# Patient Record
Sex: FEMALE | Race: WHITE | NOT HISPANIC OR LATINO | Employment: OTHER | URBAN - NONMETROPOLITAN AREA
[De-identification: names, ages, dates, MRNs, and addresses within clinical notes are randomized per-mention and may not be internally consistent; named-entity substitution may affect disease eponyms.]

---

## 2017-01-05 ENCOUNTER — OFFICE VISIT (OUTPATIENT)
Dept: FAMILY MEDICINE CLINIC | Facility: CLINIC | Age: 82
End: 2017-01-05

## 2017-01-05 ENCOUNTER — HOSPITAL ENCOUNTER (OUTPATIENT)
Dept: GENERAL RADIOLOGY | Facility: HOSPITAL | Age: 82
Discharge: HOME | End: 2017-01-05
Admitting: NURSE PRACTITIONER

## 2017-01-05 VITALS
WEIGHT: 165 LBS | SYSTOLIC BLOOD PRESSURE: 120 MMHG | OXYGEN SATURATION: 97 % | HEART RATE: 78 BPM | DIASTOLIC BLOOD PRESSURE: 68 MMHG | BODY MASS INDEX: 28.32 KG/M2

## 2017-01-05 DIAGNOSIS — J18.9 PNEUMONIA DUE TO INFECTIOUS ORGANISM, UNSPECIFIED LATERALITY, UNSPECIFIED PART OF LUNG: ICD-10-CM

## 2017-01-05 PROCEDURE — 99213 OFFICE O/P EST LOW 20 MIN: CPT | Performed by: NURSE PRACTITIONER

## 2017-01-05 PROCEDURE — 71020 HC CHEST PA AND LATERAL: CPT

## 2017-01-05 NOTE — MR AVS SNAPSHOT
Noa Lesley   1/5/2017 2:30 PM   Office Visit    Dept Phone:  [phone]   Encounter #:  [id number]    Provider:  FARTUN Dukes   Department:  [facility] PRIMARY [facility]ARE                Your Full Care Plan              Today's Medication Changes          These changes are accurate as of: 1/5/17  3:11 PM.  If you have any questions, ask your nurse or doctor.               Stop taking medication(s)listed here:     levoFLOXacin 750 MG tablet   Commonly known as:  LEVAQUIN                      Your Updated Medication List          This list is accurate as of: 1/5/17  3:11 PM.  Always use your most recent med list.                albuterol 1.25 MG/3ML nebulizer solution   Commonly known as:  ACCUNEB   Take 3 mL by nebulization Every 4 (Four) Hours As Needed for wheezing or shortness of air for up to 30 days.       amLODIPine-benazepril 10-20 MG per capsule   Commonly known as:  LOTREL   TAKE 1 CAPSULE DAILY       CALTRATE 600+D PLUS MINERALS 600-800 MG-UNIT chewable tablet       levothyroxine 100 MCG tablet   Commonly known as:  SYNTHROID, LEVOTHROID   TAKE 1 TABLET DAILY       metoprolol tartrate 25 MG tablet   Commonly known as:  LOPRESSOR       MUCINEX 600 MG 12 hr tablet   Generic drug:  guaiFENesin       Vitamin D3 1000 UNITS capsule               You Were Diagnosed With        Codes Comments    Pneumonia due to infectious organism, unspecified laterality, unspecified part of lung     ICD-10-CM: J18.9  ICD-9-CM: 136.9, 484.8       Medications to be Given to You by a Medical Professional     Due       Frequency    (none) albuterol (PROVENTIL) nebulizer solution 2.5 mg  Every 6 Hours PRN      Instructions     None    Patient Instructions History      Upcoming Appointments     Visit Type Date Time Department    FOLLOW UP 1/5/2017  2:30 PM [facility]    FOLLOW UP 3/9/2017  9:30 AM JOAN Jackson Signup     Our records indicate that you have  declined [facility] signup. If you would like to sign up for [facility], please email [email or web address] or call [phone] to obtain an activation code.             Other Info from Your Visit           Your Appointments     Mar 09, 2017  9:30 AM EST   Follow Up with Matt Blake MD   [facility] (--)    [address] [place] [id number] [phone]           Arrive 15 minutes prior to appointment.              Allergies     No Known Allergies      Reason for Visit     Follow-up Pt here for f/u on pneumonia      Vital Signs     Blood Pressure Pulse Weight Oxygen Saturation Body Mass Index Smoking Status    120/68 (BP Location: Left arm, Patient Position: Sitting) 78 165 lb (74.8 kg) 97% 28.32 kg/m2 Never Smoker      Problems and Diagnoses Noted     Pneumonia due to infectious organism, unspecified laterality, unspecified part of lung

## 2017-01-05 NOTE — PROGRESS NOTES
Subjective   Noa Sutton is a 89 y.o. female.     HPI Comments: Patient is here today for follow up on her Pneumonia. She states she is doing much better with this now. She states she has had no more fever and she is able to lay down to sleep now. She states the breathing treatments have really helped her a lot. She states she was using the breathing treatments qid, but now only bid. She states she is still taking her Mucinex and her cough medication as well, and finished all of her antibiotics.       The following portions of the patient's history were reviewed and updated as appropriate: allergies, current medications, past family history, past medical history, past social history, past surgical history and problem list.    Review of Systems   Constitutional: Negative.    HENT: Negative.    Respiratory: Positive for cough and shortness of breath. Negative for apnea, choking, chest tightness, wheezing and stridor.         Very little coughing, much improved, and SOA is only with exertion now.   Cardiovascular: Negative.    Gastrointestinal: Negative for constipation, diarrhea, nausea and vomiting.   Genitourinary: Negative.    Musculoskeletal: Negative.    Skin: Negative.    Allergic/Immunologic: Negative.    Neurological: Negative for dizziness and headaches.   Hematological: Negative.    Psychiatric/Behavioral: Negative for confusion.       Objective   Physical Exam   Constitutional: She is oriented to person, place, and time. She appears well-developed and well-nourished.   HENT:   Head: Normocephalic.   Right Ear: External ear normal.   Left Ear: External ear normal.   Nose: Nose normal.   Eyes: Conjunctivae are normal.   Cardiovascular: Normal rate, regular rhythm and normal heart sounds.    Pulmonary/Chest: Effort normal. No respiratory distress. She has wheezes in the right upper field, the right middle field, the left upper field and the left middle field. She has rhonchi in the right upper field, the  right middle field, the left upper field and the left middle field.   Much improved from previous assessment   Abdominal: Soft. Bowel sounds are normal.   Neurological: She is alert and oriented to person, place, and time.   Skin: Skin is warm and dry.   Psychiatric: She has a normal mood and affect. Her behavior is normal. Judgment and thought content normal.   Nursing note and vitals reviewed.      Assessment/Plan   Noa was seen today for follow-up.    Diagnoses and all orders for this visit:    Pneumonia due to infectious organism, unspecified laterality, unspecified part of lung  -     XR Chest PA & Lateral; Future     PA and Lat chest xray ordered, to re-evaluate her pneumonia. Patient was encouraged to keep me informed of any acute changes, lack of improvement, or any new concerning symptoms.   Patient is much improved from last visit. I advised her to continue her Nebulizer treatments every 8 hours for now. Patient voiced understanding of all instructions and denied further questions.    Patient to RTC prn.

## 2017-01-06 ENCOUNTER — TELEPHONE (OUTPATIENT)
Dept: FAMILY MEDICINE CLINIC | Facility: CLINIC | Age: 82
End: 2017-01-06

## 2017-01-06 NOTE — TELEPHONE ENCOUNTER
----- Message from Lucy Álvarez MA sent at 1/6/2017  5:52 PM EST -----  Per josesito pt notified of results    ----- Message -----     From: FARTUN Dukes     Sent: 1/6/2017   1:49 PM       To: Lucy Álvarez MA    Let her know that her Pneumonia has resolved now, so now more antibiotics are needed. Tell her to just make sure she keeps using her breathing treatments every 8 hours, and taking her Mucinex and cough medication, until she is feeling better.

## 2017-03-09 ENCOUNTER — OFFICE VISIT (OUTPATIENT)
Dept: INTERNAL MEDICINE | Facility: CLINIC | Age: 82
End: 2017-03-09

## 2017-03-09 VITALS
BODY MASS INDEX: 28.34 KG/M2 | DIASTOLIC BLOOD PRESSURE: 75 MMHG | HEART RATE: 77 BPM | HEIGHT: 64 IN | SYSTOLIC BLOOD PRESSURE: 120 MMHG | WEIGHT: 166 LBS | TEMPERATURE: 98.4 F | OXYGEN SATURATION: 93 %

## 2017-03-09 DIAGNOSIS — E03.9 ACQUIRED HYPOTHYROIDISM: ICD-10-CM

## 2017-03-09 DIAGNOSIS — R53.83 OTHER FATIGUE: ICD-10-CM

## 2017-03-09 DIAGNOSIS — I10 ESSENTIAL HYPERTENSION: Primary | ICD-10-CM

## 2017-03-09 LAB
ALBUMIN SERPL-MCNC: 4.6 G/DL (ref 3.2–4.8)
ALBUMIN/GLOB SERPL: 1.5 G/DL (ref 1.5–2.5)
ALP SERPL-CCNC: 83 U/L (ref 25–100)
ALT SERPL W P-5'-P-CCNC: 19 U/L (ref 7–40)
ANION GAP SERPL CALCULATED.3IONS-SCNC: 6 MMOL/L (ref 3–11)
AST SERPL-CCNC: 33 U/L (ref 0–33)
BILIRUB SERPL-MCNC: 0.7 MG/DL (ref 0.3–1.2)
BUN BLD-MCNC: 17 MG/DL (ref 9–23)
BUN/CREAT SERPL: 21.3 (ref 7–25)
CALCIUM SPEC-SCNC: 10.4 MG/DL (ref 8.7–10.4)
CHLORIDE SERPL-SCNC: 99 MMOL/L (ref 99–109)
CO2 SERPL-SCNC: 31 MMOL/L (ref 20–31)
CREAT BLD-MCNC: 0.8 MG/DL (ref 0.6–1.3)
DEPRECATED RDW RBC AUTO: 47.9 FL (ref 37–54)
ERYTHROCYTE [DISTWIDTH] IN BLOOD BY AUTOMATED COUNT: 14 % (ref 11.3–14.5)
GFR SERPL CREATININE-BSD FRML MDRD: 67 ML/MIN/1.73
GLOBULIN UR ELPH-MCNC: 3 GM/DL
GLUCOSE BLD-MCNC: 99 MG/DL (ref 70–100)
HCT VFR BLD AUTO: 43.2 % (ref 34.5–44)
HGB BLD-MCNC: 14.1 G/DL (ref 11.5–15.5)
MCH RBC QN AUTO: 30.5 PG (ref 27–31)
MCHC RBC AUTO-ENTMCNC: 32.6 G/DL (ref 32–36)
MCV RBC AUTO: 93.5 FL (ref 80–99)
PLATELET # BLD AUTO: 273 10*3/MM3 (ref 150–450)
PMV BLD AUTO: 11.5 FL (ref 6–12)
POTASSIUM BLD-SCNC: 4.9 MMOL/L (ref 3.5–5.5)
PROT SERPL-MCNC: 7.6 G/DL (ref 5.7–8.2)
RBC # BLD AUTO: 4.62 10*6/MM3 (ref 3.89–5.14)
SODIUM BLD-SCNC: 136 MMOL/L (ref 132–146)
TSH SERPL DL<=0.05 MIU/L-ACNC: 4.56 MIU/ML (ref 0.35–5.35)
WBC NRBC COR # BLD: 7.74 10*3/MM3 (ref 3.5–10.8)

## 2017-03-09 PROCEDURE — 84443 ASSAY THYROID STIM HORMONE: CPT | Performed by: INTERNAL MEDICINE

## 2017-03-09 PROCEDURE — 36415 COLL VENOUS BLD VENIPUNCTURE: CPT | Performed by: INTERNAL MEDICINE

## 2017-03-09 PROCEDURE — 85027 COMPLETE CBC AUTOMATED: CPT | Performed by: INTERNAL MEDICINE

## 2017-03-09 PROCEDURE — 99214 OFFICE O/P EST MOD 30 MIN: CPT | Performed by: INTERNAL MEDICINE

## 2017-03-09 PROCEDURE — 80053 COMPREHEN METABOLIC PANEL: CPT | Performed by: INTERNAL MEDICINE

## 2017-03-09 NOTE — PROGRESS NOTES
Subjective  Noa Sutton is a 89 y.o. female    HPI coming in for follow-up patient with a history of hypertension and hypothyroidism has been complaining of increasing fatigue over the last 2 weeks no night sweats fever or chills. Nonsmoker    The following portions of the patient's history were reviewed and updated as appropriate: allergies, current medications, past family history, past medical history, past social history, past surgical history, and problem list.     Review of Systems   Constitutional: Positive for fatigue. Negative for activity change, appetite change and fever.   HENT: Negative for congestion, ear discharge, ear pain and trouble swallowing.    Eyes: Positive for visual disturbance. Negative for photophobia.   Respiratory: Positive for shortness of breath. Negative for cough.    Cardiovascular: Negative for chest pain and palpitations.   Gastrointestinal: Negative for abdominal distention, abdominal pain, constipation, diarrhea, nausea and vomiting.   Endocrine: Negative.    Genitourinary: Negative for dysuria, hematuria and urgency.   Musculoskeletal: Positive for arthralgias. Negative for back pain, joint swelling and myalgias.   Skin: Negative for color change and rash.   Allergic/Immunologic: Negative.    Neurological: Negative for dizziness, weakness, light-headedness and headaches.   Hematological: Negative for adenopathy. Does not bruise/bleed easily.   Psychiatric/Behavioral: Negative for agitation, confusion, dysphoric mood and sleep disturbance. The patient is not nervous/anxious.        Vitals:    03/09/17 0949   BP: 120/75   Pulse: 77   Temp: 98.4 °F (36.9 °C)   SpO2: 93%       Objective  Physical Exam   Constitutional: She is oriented to person, place, and time. She appears well-developed and well-nourished. No distress.   HENT:   Nose: Nose normal.   Mouth/Throat: Oropharynx is clear and moist.   Eyes: Conjunctivae and EOM are normal. No scleral icterus.   Neck: No tracheal  deviation present. No thyromegaly present.   Cardiovascular: Normal rate and regular rhythm.  Exam reveals no friction rub.    No murmur heard.  Pulmonary/Chest: No respiratory distress. She has no wheezes. She has no rales.   Abdominal: Soft. She exhibits no distension and no mass. There is no tenderness. There is no guarding.   Musculoskeletal: Normal range of motion. She exhibits deformity.   Lymphadenopathy:     She has no cervical adenopathy.   Neurological: She is alert and oriented to person, place, and time. She has normal reflexes. No cranial nerve deficit. Coordination normal.   Skin: Skin is warm and dry. No rash noted. No erythema.   Psychiatric: She has a normal mood and affect. Her behavior is normal. Judgment and thought content normal.       Diagnoses and all orders for this visit:    Essential hypertension stable with current meds and low-salt diet    Acquired hypothyroidism clinically euthyroid follow TSH    Other fatigue exam unremarkable check routine lab work. No recent weight loss no change in bowel habits

## 2017-06-08 ENCOUNTER — OFFICE VISIT (OUTPATIENT)
Dept: INTERNAL MEDICINE | Facility: CLINIC | Age: 82
End: 2017-06-08

## 2017-06-08 VITALS
TEMPERATURE: 98.6 F | SYSTOLIC BLOOD PRESSURE: 115 MMHG | HEART RATE: 65 BPM | OXYGEN SATURATION: 95 % | HEIGHT: 64 IN | WEIGHT: 167 LBS | DIASTOLIC BLOOD PRESSURE: 80 MMHG | BODY MASS INDEX: 28.51 KG/M2

## 2017-06-08 DIAGNOSIS — I10 ESSENTIAL HYPERTENSION: Primary | ICD-10-CM

## 2017-06-08 DIAGNOSIS — E03.9 ACQUIRED HYPOTHYROIDISM: ICD-10-CM

## 2017-06-08 DIAGNOSIS — E55.9 VITAMIN D DEFICIENCY: ICD-10-CM

## 2017-06-08 PROCEDURE — 99397 PER PM REEVAL EST PAT 65+ YR: CPT | Performed by: INTERNAL MEDICINE

## 2017-06-08 PROCEDURE — G0439 PPPS, SUBSEQ VISIT: HCPCS | Performed by: INTERNAL MEDICINE

## 2017-06-08 RX ORDER — OMEPRAZOLE 20 MG/1
20 CAPSULE, DELAYED RELEASE ORAL DAILY
Qty: 90 CAPSULE | Refills: 3 | Status: SHIPPED | OUTPATIENT
Start: 2017-06-08 | End: 2018-04-29 | Stop reason: SDUPTHER

## 2017-06-08 NOTE — PATIENT INSTRUCTIONS
Medicare Wellness  Personal Prevention Plan of Service     Date of Office Visit:  2017  Encounter Provider:  Matt Blake MD  Place of Service:  [facility]  Patient Name: Noa Sutton  :  10/7/1926    As part of the Medicare Wellness portion of your visit today, we are providing you with this personalized preventive plan of services (PPPS). This plan is based upon recommendations of the [place] Preventive Services Task Force (USPSTF) and the Advisory Committee on Immunization Practices (ACIP).    This lists the preventive care services that should be considered, and provides dates of when you are due. Items listed as completed are up-to-date and do not require any further intervention.    Health Maintenance   Topic Date Due   • TDAP/TD VACCINES (1 - Tdap) 10/07/1945   • PNEUMOCOCCAL VACCINES (65+ LOW/MEDIUM RISK) (2 of 2 - PPSV23) 2012   • INFLUENZA VACCINE  2017   • MEDICARE ANNUAL WELLNESS  2018   • ZOSTER VACCINE  Completed       No orders of the defined types were placed in this encounter.      No Follow-up on file.

## 2017-06-08 NOTE — PROGRESS NOTES
QUICK REFERENCE INFORMATION:  The ABCs of the Annual Wellness Visit    Subsequent Medicare Wellness Visit    HEALTH RISK ASSESSMENT    10/7/1926    Recent Hospitalizations:  No hospitalization(s) within the last year..        Current Medical Providers:  Patient Care Team:  Matt Blake MD as PCP - General        Smoking Status:  History   Smoking Status   • Never Smoker   Smokeless Tobacco   • Never Used       Alcohol Consumption:  History   Alcohol use Not on file       Depression Screen:   PHQ-9 Depression Screening 6/8/2017   Little interest or pleasure in doing things 0   Feeling down, depressed, or hopeless 0   Trouble falling or staying asleep, or sleeping too much -   Feeling tired or having little energy -   Poor appetite or overeating -   Feeling bad about yourself - or that you are a failure or have let yourself or your family down -   Trouble concentrating on things, such as reading the newspaper or watching television -   Moving or speaking so slowly that other people could have noticed. Or the opposite - being so fidgety or restless that you have been moving around a lot more than usual -   Thoughts that you would be better off dead, or of hurting yourself in some way -   PHQ-9 Total Score 0   If you checked off any problems, how difficult have these problems made it for you to do your work, take care of things at home, or get along with other people? -       Health Habits and Functional and Cognitive Screening:  Functional & Cognitive Status 6/8/2017   Do you have difficulty preparing food and eating? No   Do you have difficulty bathing yourself? No   Do you have difficulty getting dressed? No   Do you have difficulty using the toilet? No   Do you have difficulty moving around from place to place? No   In the past year have you fallen or experienced a near fall? No   Do you need help using the phone?  No   Are you deaf or do you have serious difficulty hearing?  No   Do you need help with  transportation? No   Do you need help shopping? No   Do you need help preparing meals?  No   Do you need help with housework?  No   Do you need help with laundry? No   Do you need help taking your medications? No   Do you need help managing money? No   Do you have difficulty concentrating, remembering or making decisions? No       Health Habits  Current Diet: Well Balanced Diet  Dental Exam: Up to date  Eye Exam: Up to date  Exercise (times per week): 7 times per week  Current Exercise Activities Include: (S) Walking (body recall X3 days a week)      Does the patient have evidence of cognitive impairment? No    Aspirin use counseling: Does not need ASA (and currently is not on it)      Recent Lab Results:  CMP:  Lab Results   Component Value Date    BUN 17 03/09/2017    CREATININE 0.80 03/09/2017    EGFRIFNONA 67 03/09/2017    BCR 21.3 03/09/2017     03/09/2017    K 4.9 03/09/2017    CO2 31.0 03/09/2017    CALCIUM 10.4 03/09/2017    ALBUMIN 4.60 03/09/2017    LABIL2 1.5 03/09/2017    BILITOT 0.7 03/09/2017    ALKPHOS 83 03/09/2017    AST 33 03/09/2017    ALT 19 03/09/2017     Lipid Panel:     HbA1c:       Visual Acuity:  No exam data present    Age-appropriate Screening Schedule:  Refer to the list below for future screening recommendations based on patient's age, sex and/or medical conditions. Orders for these recommended tests are listed in the plan section. The patient has been provided with a written plan.    Health Maintenance   Topic Date Due   • TDAP/TD VACCINES (1 - Tdap) 10/07/1945   • PNEUMOCOCCAL VACCINES (65+ LOW/MEDIUM RISK) (2 of 2 - PPSV23) 08/17/2012   • INFLUENZA VACCINE  08/01/2017   • ZOSTER VACCINE  Completed        Subjective   History of Present Illness    Noa Sutton is a 89 y.o. female who presents for an Subsequent Wellness Visit.    The following portions of the patient's history were reviewed and updated as appropriate: allergies, current medications, past family history, past  medical history, past social history, past surgical history and problem list.    Outpatient Medications Prior to Visit   Medication Sig Dispense Refill   • amLODIPine-benazepril (LOTREL) 10-20 MG per capsule TAKE 1 CAPSULE DAILY 90 capsule 3   • Calcium Carbonate-Vit D-Min (CALTRATE 600+D PLUS MINERALS) 600-800 MG-UNIT chewable tablet Chew 1 tablet daily.     • Cholecalciferol (VITAMIN D3) 1000 UNITS capsule Take 1 capsule by mouth daily.     • metoprolol tartrate (LOPRESSOR) 25 MG tablet TAKE 1 TABLET TWICE A  tablet 2   • MUCINEX 600 MG 12 hr tablet take 1 tablet by mouth every 12 hours if needed  0   • levothyroxine (SYNTHROID, LEVOTHROID) 100 MCG tablet TAKE 1 TABLET DAILY 90 tablet 2     Facility-Administered Medications Prior to Visit   Medication Dose Route Frequency Provider Last Rate Last Dose   • albuterol (PROVENTIL) nebulizer solution 2.5 mg  2.5 mg Nebulization Q6H PRN Soraya Bhatti, APRN   2.5 mg at 12/30/16 1900       Patient Active Problem List   Diagnosis   • Essential hypertension   • Acquired hypothyroidism   • Vitamin D deficiency       Advance Care Planning:  has an advance directive - a copy HAS NOT been provided    Identification of Risk Factors:  Risk factors include: increased fall risk, hearing limitations and incontinence.    Review of Systems   Constitutional: Negative.  Negative for activity change, appetite change, fatigue and fever.   HENT: Negative for congestion, ear discharge, ear pain and trouble swallowing.    Eyes: Negative for photophobia and visual disturbance.   Respiratory: Negative for cough and shortness of breath.    Cardiovascular: Negative for chest pain and palpitations.   Gastrointestinal: Negative for abdominal distention, abdominal pain, constipation, diarrhea, nausea and vomiting.   Endocrine: Negative.    Genitourinary: Positive for urgency. Negative for dysuria and hematuria.        Stress incontinence   Musculoskeletal: Positive for arthralgias.  "Negative for back pain, joint swelling and myalgias.   Skin: Negative for color change and rash.   Allergic/Immunologic: Negative.    Neurological: Negative for dizziness, weakness, light-headedness and headaches.   Hematological: Negative for adenopathy. Does not bruise/bleed easily.   Psychiatric/Behavioral: Positive for sleep disturbance. Negative for agitation, confusion and dysphoric mood. The patient is not nervous/anxious.        Compared to one year ago, the patient feels her physical health is the same.  Compared to one year ago, the patient feels her mental health is the same.    Objective     Physical Exam   Constitutional: She is oriented to person, place, and time. She appears well-developed and well-nourished. No distress.   HENT:   Nose: Nose normal.   Mouth/Throat: Oropharynx is clear and moist.   Eyes: Conjunctivae and EOM are normal. No scleral icterus.   Neck: No tracheal deviation present. No thyromegaly present.   Cardiovascular: Normal rate and regular rhythm.  Exam reveals no friction rub.    No murmur heard.  Pulmonary/Chest: No respiratory distress. She has no wheezes. She has no rales.   Abdominal: Soft. She exhibits no distension and no mass. There is no tenderness. There is no guarding.   Musculoskeletal: Normal range of motion. She exhibits deformity.   Lymphadenopathy:     She has no cervical adenopathy.   Neurological: She is alert and oriented to person, place, and time. She has normal reflexes. No cranial nerve deficit. Coordination normal.   Skin: Skin is warm and dry. No rash noted. No erythema.   Psychiatric: She has a normal mood and affect. Her behavior is normal. Judgment and thought content normal.       Vitals:    06/08/17 1029   BP: 115/80   Pulse: 65   Temp: 98.6 °F (37 °C)   SpO2: 95%   Weight: 167 lb (75.8 kg)   Height: 64\" (162.6 cm)       Body mass index is 28.67 kg/(m^2).  Discussed the patient's BMI with her. The BMI is in the acceptable range.    Assessment/Plan "   Patient Self-Management and Personalized Health Advice  The patient has been provided with information about: diet and fall prevention and preventive services including:   · Fall Risk assessment done, Fall Risk plan of care done, Urinary Incontinence assessment done.    Visit Diagnoses:    ICD-10-CM ICD-9-CM   1. Essential hypertension. Stable with current meds I10 401.9   2. Acquired hypothyroidism. Clinically euthyroid E03.9 244.9   3. Vitamin D deficiency. Stable on supplement E55.9 268.9       No orders of the defined types were placed in this encounter.      Outpatient Encounter Prescriptions as of 6/8/2017   Medication Sig Dispense Refill   • amLODIPine-benazepril (LOTREL) 10-20 MG per capsule TAKE 1 CAPSULE DAILY 90 capsule 3   • Calcium Carbonate-Vit D-Min (CALTRATE 600+D PLUS MINERALS) 600-800 MG-UNIT chewable tablet Chew 1 tablet daily.     • Cholecalciferol (VITAMIN D3) 1000 UNITS capsule Take 1 capsule by mouth daily.     • metoprolol tartrate (LOPRESSOR) 25 MG tablet TAKE 1 TABLET TWICE A  tablet 2   • MUCINEX 600 MG 12 hr tablet take 1 tablet by mouth every 12 hours if needed  0   • levothyroxine (SYNTHROID, LEVOTHROID) 100 MCG tablet TAKE 1 TABLET DAILY 90 tablet 2     Facility-Administered Encounter Medications as of 6/8/2017   Medication Dose Route Frequency Provider Last Rate Last Dose   • albuterol (PROVENTIL) nebulizer solution 2.5 mg  2.5 mg Nebulization Q6H PRN Soraya Bhatti, APRN   2.5 mg at 12/30/16 1900       Reviewed use of high risk medication in the elderly: yes  Reviewed for potential of harmful drug interactions in the elderly: yes    Follow Up:  No Follow-up on file.     An After Visit Summary and PPPS with all of these plans were given to the patient.

## 2017-06-12 RX ORDER — LEVOTHYROXINE SODIUM 0.1 MG/1
TABLET ORAL
Qty: 90 TABLET | Refills: 3 | Status: SHIPPED | OUTPATIENT
Start: 2017-06-12 | End: 2018-06-07 | Stop reason: SDUPTHER

## 2017-08-10 ENCOUNTER — OFFICE VISIT (OUTPATIENT)
Dept: INTERNAL MEDICINE | Facility: CLINIC | Age: 82
End: 2017-08-10

## 2017-08-10 VITALS
TEMPERATURE: 98.6 F | WEIGHT: 164.38 LBS | OXYGEN SATURATION: 97 % | HEIGHT: 64 IN | HEART RATE: 75 BPM | SYSTOLIC BLOOD PRESSURE: 121 MMHG | BODY MASS INDEX: 28.06 KG/M2 | DIASTOLIC BLOOD PRESSURE: 80 MMHG

## 2017-08-10 DIAGNOSIS — I10 ESSENTIAL HYPERTENSION: ICD-10-CM

## 2017-08-10 DIAGNOSIS — R39.9 URINARY SYMPTOM OR SIGN: Primary | ICD-10-CM

## 2017-08-10 LAB
ALBUMIN SERPL-MCNC: 4.5 G/DL (ref 3.5–5)
ALBUMIN/GLOB SERPL: 1.6 G/DL (ref 1–2)
ALP SERPL-CCNC: 86 U/L (ref 38–126)
ALT SERPL-CCNC: 36 U/L (ref 13–69)
AST SERPL-CCNC: 40 U/L (ref 15–46)
BILIRUB BLD-MCNC: NEGATIVE MG/DL
BILIRUB SERPL-MCNC: 0.7 MG/DL (ref 0.2–1.3)
BUN SERPL-MCNC: 17 MG/DL (ref 7–20)
BUN/CREAT SERPL: 17 (ref 7.1–23.5)
CALCIUM SERPL-MCNC: 9.8 MG/DL (ref 8.4–10.2)
CHLORIDE SERPL-SCNC: 98 MMOL/L (ref 98–107)
CLARITY, POC: CLEAR
CO2 SERPL-SCNC: 26 MMOL/L (ref 26–30)
COLOR UR: YELLOW
CREAT SERPL-MCNC: 1 MG/DL (ref 0.6–1.3)
ERYTHROCYTE [DISTWIDTH] IN BLOOD BY AUTOMATED COUNT: 13.3 % (ref 11.5–14.5)
GLOBULIN SER CALC-MCNC: 2.8 GM/DL
GLUCOSE SERPL-MCNC: 108 MG/DL (ref 74–98)
GLUCOSE UR STRIP-MCNC: NEGATIVE MG/DL
HCT VFR BLD AUTO: 41.4 % (ref 37–47)
HGB BLD-MCNC: 13.7 G/DL (ref 12–16)
KETONES UR QL: NEGATIVE
LEUKOCYTE EST, POC: ABNORMAL
MCH RBC QN AUTO: 30.3 PG (ref 27–31)
MCHC RBC AUTO-ENTMCNC: 33.1 G/DL (ref 30–37)
MCV RBC AUTO: 91.6 FL (ref 81–99)
NITRITE UR-MCNC: NEGATIVE MG/ML
PH UR: 7 [PH] (ref 5–8)
PLATELET # BLD AUTO: 286 10*3/MM3 (ref 130–400)
POTASSIUM SERPL-SCNC: 5.2 MMOL/L (ref 3.5–5.1)
PROT SERPL-MCNC: 7.3 G/DL (ref 6.3–8.2)
PROT UR STRIP-MCNC: NEGATIVE MG/DL
RBC # BLD AUTO: 4.52 10*6/MM3 (ref 4.2–5.4)
RBC # UR STRIP: NEGATIVE /UL
SODIUM SERPL-SCNC: 136 MMOL/L (ref 137–145)
SP GR UR: 1.01 (ref 1–1.03)
UROBILINOGEN UR QL: NORMAL
WBC # BLD AUTO: 8.4 10*3/MM3 (ref 4.8–10.8)

## 2017-08-10 PROCEDURE — 99213 OFFICE O/P EST LOW 20 MIN: CPT | Performed by: INTERNAL MEDICINE

## 2017-08-10 PROCEDURE — 81003 URINALYSIS AUTO W/O SCOPE: CPT | Performed by: INTERNAL MEDICINE

## 2017-08-10 NOTE — PROGRESS NOTES
Subjective  Noa Sutton is a 89 y.o. female    HPI coming in accompanied by her son was giving us some of the history. Recent history of urinary tract infection for which she was placed on Macrobid for 7 days with some improvement in her urinary symptoms. Currently denies dysuria or hematuria. Denies fever or chills. However she complains of fatigue and some numbness in her feet. Denies melanotic hematochezia. No nausea vomiting or diarrhea    The following portions of the patient's history were reviewed and updated as appropriate: allergies, current medications, past family history, past medical history, past social history, past surgical history, and problem list.     Review of Systems   Constitutional: Positive for fatigue. Negative for activity change, appetite change and fever.   HENT: Negative for congestion, ear discharge, ear pain and trouble swallowing.    Eyes: Negative for photophobia and visual disturbance.   Respiratory: Negative for cough and shortness of breath.    Cardiovascular: Negative for chest pain and palpitations.   Gastrointestinal: Negative for abdominal distention, abdominal pain, constipation, diarrhea, nausea and vomiting.   Endocrine: Negative.    Genitourinary: Negative for dysuria, hematuria and urgency.   Musculoskeletal: Positive for arthralgias. Negative for back pain, joint swelling and myalgias.   Skin: Negative for color change and rash.   Allergic/Immunologic: Negative.    Neurological: Positive for numbness. Negative for dizziness, weakness, light-headedness and headaches.   Hematological: Negative for adenopathy. Does not bruise/bleed easily.   Psychiatric/Behavioral: Negative for agitation, confusion and dysphoric mood. The patient is not nervous/anxious.        Vitals:    08/10/17 1342   BP: 121/80   Pulse: 75   Temp: 98.6 °F (37 °C)   SpO2: 97%       Objective  Physical Exam   Constitutional: She is oriented to person, place, and time. She appears well-developed and  well-nourished. No distress.   HENT:   Nose: Nose normal.   Mouth/Throat: Oropharynx is clear and moist.   Eyes: Conjunctivae and EOM are normal. No scleral icterus.   Neck: No tracheal deviation present. No thyromegaly present.   Cardiovascular: Normal rate and regular rhythm.  Exam reveals no friction rub.    No murmur heard.  Pulmonary/Chest: No respiratory distress. She has no wheezes. She has no rales.   Abdominal: Soft. She exhibits no distension and no mass. There is no tenderness. There is no guarding.   Musculoskeletal: Normal range of motion. She exhibits deformity.   Lymphadenopathy:     She has no cervical adenopathy.   Neurological: She is alert and oriented to person, place, and time. She has normal reflexes. No cranial nerve deficit. Coordination normal.   Skin: Skin is warm and dry. No rash noted. No erythema.   Psychiatric: She has a normal mood and affect. Her behavior is normal. Judgment and thought content normal.       Diagnoses and all orders for this visit:    Urinary symptom or sign. Urine analysis without evidence of significant infection. In view of her symptoms of fatigue and weakness will check routine lab work encouraged to push fluids will observe for now  -     POCT urinalysis dipstick, automated

## 2017-11-10 ENCOUNTER — OFFICE VISIT (OUTPATIENT)
Dept: INTERNAL MEDICINE | Facility: CLINIC | Age: 82
End: 2017-11-10

## 2017-11-10 VITALS
TEMPERATURE: 98.6 F | BODY MASS INDEX: 28.36 KG/M2 | WEIGHT: 166.13 LBS | HEART RATE: 73 BPM | HEIGHT: 64 IN | SYSTOLIC BLOOD PRESSURE: 120 MMHG | OXYGEN SATURATION: 96 % | DIASTOLIC BLOOD PRESSURE: 73 MMHG

## 2017-11-10 DIAGNOSIS — I10 ESSENTIAL HYPERTENSION: ICD-10-CM

## 2017-11-10 DIAGNOSIS — E03.9 ACQUIRED HYPOTHYROIDISM: Primary | ICD-10-CM

## 2017-11-10 DIAGNOSIS — F51.01 PRIMARY INSOMNIA: ICD-10-CM

## 2017-11-10 PROCEDURE — 90662 IIV NO PRSV INCREASED AG IM: CPT | Performed by: INTERNAL MEDICINE

## 2017-11-10 PROCEDURE — G0008 ADMIN INFLUENZA VIRUS VAC: HCPCS | Performed by: INTERNAL MEDICINE

## 2017-11-10 PROCEDURE — 99214 OFFICE O/P EST MOD 30 MIN: CPT | Performed by: INTERNAL MEDICINE

## 2017-11-10 NOTE — PROGRESS NOTES
"Subjective  Noa Sutton is a 89 y.o. female    HPI coming in for follow-up patient with hypothyroidism and hypertension has reflux esophagitis complains of sleep disturbances with poor sleep hygiene. Fairly active. No history of memory deficits    The following portions of the patient's history were reviewed and updated as appropriate: allergies, current medications, past family history, past medical history, past social history, past surgical history, and problem list.     Review of Systems   Constitutional: Positive for fatigue. Negative for activity change, appetite change and fever.   HENT: Negative for congestion, ear discharge, ear pain and trouble swallowing.    Eyes: Negative for photophobia and visual disturbance.   Respiratory: Negative for cough and shortness of breath.    Cardiovascular: Negative for chest pain and palpitations.   Gastrointestinal: Negative for abdominal distention, abdominal pain, constipation, diarrhea, nausea and vomiting.   Endocrine: Negative.    Genitourinary: Negative for dysuria, hematuria and urgency.   Musculoskeletal: Positive for arthralgias. Negative for back pain, joint swelling and myalgias.   Skin: Negative for color change and rash.   Allergic/Immunologic: Negative.    Neurological: Negative for dizziness, weakness, light-headedness and headaches.   Hematological: Negative for adenopathy. Does not bruise/bleed easily.   Psychiatric/Behavioral: Positive for sleep disturbance. Negative for agitation, confusion and dysphoric mood. The patient is not nervous/anxious.        Visit Vitals   • /73   • Pulse 73   • Temp 98.6 °F (37 °C)   • Ht 64\" (162.6 cm)   • Wt 166 lb 2 oz (75.4 kg)   • SpO2 96%   • BMI 28.52 kg/m2       Objective  Physical Exam   Constitutional: She is oriented to person, place, and time. She appears well-developed and well-nourished. No distress.   HENT:   Nose: Nose normal.   Mouth/Throat: Oropharynx is clear and moist.   Eyes: Conjunctivae and EOM " are normal. No scleral icterus.   Neck: No tracheal deviation present. No thyromegaly present.   Cardiovascular: Normal rate and regular rhythm.  Exam reveals no friction rub.    No murmur heard.  Pulmonary/Chest: No respiratory distress. She has no wheezes. She has no rales.   Abdominal: Soft. She exhibits no distension and no mass. There is no tenderness. There is no guarding.   Musculoskeletal: Normal range of motion. She exhibits deformity.   Lymphadenopathy:     She has no cervical adenopathy.   Neurological: She is alert and oriented to person, place, and time. She has normal reflexes. No cranial nerve deficit. Coordination normal.   Skin: Skin is warm and dry. No rash noted. No erythema.   Psychiatric: She has a normal mood and affect. Her behavior is normal. Thought content normal.       Diagnoses and all orders for this visit:    Acquired hypothyroidism clinically euthyroid follow TSH    Essential hypertension stable with current meds and low-salt diet    Primary insomnia counseled about sleep hygiene Will hold off on medications for this    Other orders  -     Flu Vaccine High Dose PF 65YR+ ([id number])

## 2017-12-11 RX ORDER — AMLODIPINE BESYLATE AND BENAZEPRIL HYDROCHLORIDE 10; 20 MG/1; MG/1
CAPSULE ORAL
Qty: 90 CAPSULE | Refills: 3 | Status: SHIPPED | OUTPATIENT
Start: 2017-12-11 | End: 2018-12-04 | Stop reason: SDUPTHER

## 2018-04-30 RX ORDER — OMEPRAZOLE 20 MG/1
CAPSULE, DELAYED RELEASE ORAL
Qty: 90 CAPSULE | Refills: 3 | Status: SHIPPED | OUTPATIENT
Start: 2018-04-30 | End: 2019-04-28 | Stop reason: SDUPTHER

## 2018-05-10 ENCOUNTER — OFFICE VISIT (OUTPATIENT)
Dept: INTERNAL MEDICINE | Facility: CLINIC | Age: 83
End: 2018-05-10

## 2018-05-10 VITALS
HEART RATE: 74 BPM | TEMPERATURE: 99.1 F | OXYGEN SATURATION: 96 % | HEIGHT: 64 IN | WEIGHT: 161 LBS | DIASTOLIC BLOOD PRESSURE: 48 MMHG | BODY MASS INDEX: 27.49 KG/M2 | SYSTOLIC BLOOD PRESSURE: 118 MMHG

## 2018-05-10 DIAGNOSIS — D50.8 IRON DEFICIENCY ANEMIA SECONDARY TO INADEQUATE DIETARY IRON INTAKE: ICD-10-CM

## 2018-05-10 DIAGNOSIS — G25.81 RLS (RESTLESS LEGS SYNDROME): ICD-10-CM

## 2018-05-10 DIAGNOSIS — E03.9 ACQUIRED HYPOTHYROIDISM: Primary | ICD-10-CM

## 2018-05-10 DIAGNOSIS — G62.9 NEUROPATHY: ICD-10-CM

## 2018-05-10 DIAGNOSIS — I10 ESSENTIAL HYPERTENSION: ICD-10-CM

## 2018-05-10 LAB
FERRITIN SERPL-MCNC: 125 NG/ML (ref 11.1–264)
FOLATE SERPL-MCNC: 19.6 NG/ML
VIT B12 SERPL-MCNC: 872 PG/ML (ref 239–931)

## 2018-05-10 PROCEDURE — 99214 OFFICE O/P EST MOD 30 MIN: CPT | Performed by: INTERNAL MEDICINE

## 2018-05-10 NOTE — PROGRESS NOTES
"Subjective  Noa Sutton is a 89 y.o. female    HPI coming in for a follow-up she is accompanied by her son was giving us some of the history has hypertension and hypothyroidism with reflux esophagitis lately has been complaining of a burning sensation intermittently with tingling on the dorsum of her feet bilaterally right more than the left side. Denies alcohol use.    The following portions of the patient's history were reviewed and updated as appropriate: allergies, current medications, past family history, past medical history, past social history, past surgical history, and problem list.     Review of Systems   Constitutional: Negative.  Negative for activity change, appetite change, fatigue and fever.   HENT: Negative for congestion, ear discharge, ear pain and trouble swallowing.    Eyes: Negative for photophobia and visual disturbance.   Respiratory: Negative for cough and shortness of breath.    Cardiovascular: Negative for chest pain and palpitations.   Gastrointestinal: Negative for abdominal distention, abdominal pain, constipation, diarrhea, nausea and vomiting.   Endocrine: Negative.    Genitourinary: Negative for dysuria, hematuria and urgency.   Musculoskeletal: Positive for arthralgias. Negative for back pain, joint swelling and myalgias.   Skin: Negative for color change and rash.   Allergic/Immunologic: Negative.    Neurological: Positive for numbness. Negative for dizziness, weakness, light-headedness and headaches.   Hematological: Negative for adenopathy. Does not bruise/bleed easily.   Psychiatric/Behavioral: Positive for sleep disturbance. Negative for agitation, confusion and dysphoric mood. The patient is not nervous/anxious.        Visit Vitals  /48 (BP Location: Right arm, Patient Position: Sitting)   Pulse 74   Temp 99.1 °F (37.3 °C)   Ht 162.6 cm (64\")   Wt 73 kg (161 lb)   SpO2 96%   BMI 27.64 kg/m²       Objective  Physical Exam   Constitutional: She is oriented to person, " place, and time. She appears well-developed and well-nourished. No distress.   HENT:   Nose: Nose normal.   Mouth/Throat: Oropharynx is clear and moist.   Eyes: Conjunctivae and EOM are normal. No scleral icterus.   Neck: No tracheal deviation present. No thyromegaly present.   Cardiovascular: Normal rate and regular rhythm.  Exam reveals no friction rub.    No murmur heard.  Pulmonary/Chest: No respiratory distress. She has no wheezes. She has no rales.   Abdominal: Soft. She exhibits no distension and no mass. There is no tenderness. There is no guarding.   Musculoskeletal: Normal range of motion. She exhibits edema and deformity.   Lymphadenopathy:     She has no cervical adenopathy.   Neurological: She is alert and oriented to person, place, and time. She has normal reflexes. No cranial nerve deficit. Coordination normal.   Skin: Skin is warm and dry. No rash noted. No erythema.   Psychiatric: She has a normal mood and affect. Her behavior is normal. Judgment and thought content normal.       Diagnoses and all orders for this visit:    Acquired hypothyroidism clinically euthyroid follow TSH    Essential hypertension stable with current meds and low-salt diet    Neuropathy rule out vitamin deficiency. Rule out RLS check ferritin level will cut down on omeprazole 2 when necessary.

## 2018-06-07 RX ORDER — LEVOTHYROXINE SODIUM 0.1 MG/1
TABLET ORAL
Qty: 90 TABLET | Refills: 3 | Status: SHIPPED | OUTPATIENT
Start: 2018-06-07 | End: 2019-06-03 | Stop reason: SDUPTHER

## 2018-07-17 ENCOUNTER — OFFICE VISIT (OUTPATIENT)
Dept: INTERNAL MEDICINE | Facility: CLINIC | Age: 83
End: 2018-07-17

## 2018-07-17 ENCOUNTER — TELEPHONE (OUTPATIENT)
Dept: INTERNAL MEDICINE | Facility: CLINIC | Age: 83
End: 2018-07-17

## 2018-07-17 VITALS
DIASTOLIC BLOOD PRESSURE: 74 MMHG | SYSTOLIC BLOOD PRESSURE: 136 MMHG | OXYGEN SATURATION: 94 % | TEMPERATURE: 99.1 F | HEIGHT: 64 IN | HEART RATE: 87 BPM | WEIGHT: 161 LBS | BODY MASS INDEX: 27.49 KG/M2

## 2018-07-17 DIAGNOSIS — G89.29 CHRONIC MIDLINE LOW BACK PAIN WITH SCIATICA, SCIATICA LATERALITY UNSPECIFIED: Primary | ICD-10-CM

## 2018-07-17 DIAGNOSIS — M54.40 CHRONIC MIDLINE LOW BACK PAIN WITH SCIATICA, SCIATICA LATERALITY UNSPECIFIED: Primary | ICD-10-CM

## 2018-07-17 PROCEDURE — 99213 OFFICE O/P EST LOW 20 MIN: CPT | Performed by: PHYSICIAN ASSISTANT

## 2018-07-17 RX ORDER — BACLOFEN 10 MG/1
5 TABLET ORAL 3 TIMES DAILY
Qty: 15 TABLET | Refills: 1 | Status: SHIPPED | OUTPATIENT
Start: 2018-07-17 | End: 2019-03-07

## 2018-07-17 NOTE — PROGRESS NOTES
"Subjective     Chief Complaint: back pain    History of Present Illness     Noa Sutton is a 89 y.o. female presenting with complaints of worsening low back pain x 2 weeks. States she's always dealt with back issues, but seems to be worsening. Is now having pain and radiation down leg. Denies leg weakness. Her pain is keeping her in the bed most of the day, family states she hasn't been as active. Typically takes tylenol once or twice per day. No NSAIDs due to kidney functioning.    The following portions of the patient's history were reviewed and updated as appropriate: current medications, allergies, PMH.    Review of Systems   Musculoskeletal: Positive for back pain.     Objective     Vitals:    07/17/18 1100   BP: 136/74   Pulse: 87   Temp: 99.1 °F (37.3 °C)   SpO2: 94%   Weight: 73 kg (161 lb)   Height: 162.6 cm (64.02\")     Physical Exam   Constitutional: She appears well-developed and well-nourished.   Musculoskeletal:        Lumbar back: She exhibits tenderness.   SI joint tenderness. Positive straight leg raise bilaterally. Pt in wheelchair, family at side.     Assessment/Plan     Diagnoses and all orders for this visit:    Chronic midline low back pain with sciatica, sciatica laterality unspecified  -     baclofen (LIORESAL) 10 MG tablet; Take 0.5 tablets by mouth 3 (Three) Times a Day.  -     MRI Lumbar Spine Without Contrast  -     Ambulatory Referral to Neurosurgery    Encouraged regular use of tylenol, heat TID.  Will try half tab of baclofen under family supervision to see if this helps, doesn't make her too drowsy.  Discussed PT but patient and family decline at this time.    Carmen Neves PA-C  07/17/2018         Please note that portions of this note were completed with a voice recognition program. Efforts were made to edit dictation, but occasionally words are mistranscribed.    "

## 2018-07-17 NOTE — TELEPHONE ENCOUNTER
Patient's son called this morning, stating that the patient is having issues with her deg. Disc in her back. She can hardly move around or walk. While working on an answer for him, he hung up and I could not reach him again. He was wanting to know if he needed to call an ambulance and have her evaluated.

## 2018-07-24 ENCOUNTER — TELEPHONE (OUTPATIENT)
Dept: INTERNAL MEDICINE | Facility: CLINIC | Age: 83
End: 2018-07-24

## 2018-07-24 ENCOUNTER — HOSPITAL ENCOUNTER (OUTPATIENT)
Dept: MRI IMAGING | Facility: HOSPITAL | Age: 83
Discharge: HOME | End: 2018-07-24
Admitting: PHYSICIAN ASSISTANT

## 2018-07-24 PROCEDURE — 72148 MRI LUMBAR SPINE W/O DYE: CPT

## 2018-07-25 RX ORDER — HYDROCODONE BITARTRATE AND ACETAMINOPHEN 5; 325 MG/1; MG/1
0.5 TABLET ORAL EVERY 6 HOURS PRN
Qty: 20 TABLET | Refills: 0 | Status: SHIPPED | OUTPATIENT
Start: 2018-07-25 | End: 2018-08-03 | Stop reason: SDUPTHER

## 2018-08-02 ENCOUNTER — OFFICE VISIT (OUTPATIENT)
Dept: NEUROSURGERY | Facility: CLINIC | Age: 83
End: 2018-08-02

## 2018-08-02 VITALS — TEMPERATURE: 98 F | BODY MASS INDEX: 26.46 KG/M2 | HEIGHT: 64 IN | WEIGHT: 155 LBS

## 2018-08-02 DIAGNOSIS — S32.020A CLOSED COMPRESSION FRACTURE OF SECOND LUMBAR VERTEBRA, INITIAL ENCOUNTER: ICD-10-CM

## 2018-08-02 DIAGNOSIS — S32.050D CLOSED COMPRESSION FRACTURE OF L5 LUMBAR VERTEBRA WITH ROUTINE HEALING, SUBSEQUENT ENCOUNTER: Primary | ICD-10-CM

## 2018-08-02 DIAGNOSIS — S32.010A CLOSED COMPRESSION FRACTURE OF FIRST LUMBAR VERTEBRA, INITIAL ENCOUNTER: Primary | ICD-10-CM

## 2018-08-02 PROCEDURE — 99203 OFFICE O/P NEW LOW 30 MIN: CPT | Performed by: NEUROLOGICAL SURGERY

## 2018-08-02 NOTE — PROGRESS NOTES
Subjective   Patient ID: Noa Sutton is a 89 y.o. female is being seen for consultation today at the request of Carmen Neves PA-C  Chief Complaint: Back pain    History of Present Illness: The patient is a 89-year-old retired  from [employer] with a 3 week history of pain in her lower back that began spontaneously, although she had been lifting something around that time.  6 years ago she had a symptomatic compression fracture of T12 that resolved over time.  This time she has spent more of her time in bed simply to get relief of pain and has had to use Lortab 5 for pain.  In the past day or 2 the pain has been improving.  It is in her mid to upper lumbar spine region.    Review of Radiographic Studies:   Lumbar MRI scan shows a complete resorption of the prior osteoporotic compression fracture at T12 with no remaining vertebral body.  There is signal on T1 in the anterior upper L1 vertebral body and in the lower L2 vertebral body with compression of the lower L2 vertebral body, but no significant compression of the L1 vertebral body.  This appears to represent an early stage of osteoporotic compression fracture.    The following portions of the patient's history were reviewed, updated as appropriate and approved: allergies, current medications, past family history, past medical history, past social history, past surgical history, review of systems and problem list.    Review of Systems   Constitutional: Positive for activity change, appetite change and fatigue. Negative for chills, diaphoresis, fever and unexpected weight change.   HENT: Negative for congestion, dental problem, drooling, ear discharge, ear pain, facial swelling, hearing loss, mouth sores, nosebleeds, postnasal drip, rhinorrhea, sinus pressure, sneezing, sore throat, tinnitus, trouble swallowing and voice change.    Eyes: Positive for photophobia. Negative for pain, discharge, redness, itching and visual disturbance.    Respiratory: Negative for apnea, cough, choking, chest tightness, shortness of breath, wheezing and stridor.    Cardiovascular: Negative for chest pain, palpitations and leg swelling.   Gastrointestinal: Negative for abdominal distention, abdominal pain, anal bleeding, blood in stool, constipation, diarrhea, nausea, rectal pain and vomiting.   Endocrine: Positive for polyuria. Negative for cold intolerance, heat intolerance, polydipsia and polyphagia.   Genitourinary: Positive for decreased urine volume, flank pain and frequency. Negative for difficulty urinating, dysuria, enuresis, genital sores, hematuria and urgency.   Musculoskeletal: Positive for arthralgias, back pain, gait problem, joint swelling, myalgias and neck stiffness. Negative for neck pain.   Skin: Negative for color change, pallor, rash and wound.   Allergic/Immunologic: Negative for environmental allergies, food allergies and immunocompromised state.   Neurological: Positive for weakness. Negative for dizziness, tremors, seizures, syncope, facial asymmetry, speech difficulty, light-headedness, numbness and headaches.   Hematological: Negative for adenopathy. Does not bruise/bleed easily.   Psychiatric/Behavioral: Negative for agitation, behavioral problems, confusion, decreased concentration, dysphoric mood, hallucinations, self-injury, sleep disturbance and suicidal ideas. The patient is nervous/anxious. The patient is not hyperactive.    All other systems reviewed and are negative.      Objective     NEUROLOGICAL EXAMINATION:      MENTAL STATUS:  Alert and oriented.  Speech intact.  Recent and remote memory intact.      CRANIAL NERVES:  Cranial nerve II:  Visual fields are full.  Cranial nerves III, IV and VI:  PERRLADC.  Extraocular movements are intact.  Nystagmus is not present.  Cranial nerve V:  Facial sensation is intact.  Cranial nerve VII:  Muscles of facial expression reveal no asymmetry.  Cranial nerve VIII:  Hearing is  intact.  Cranial nerves IX and X:  Palate elevates symmetrically.  Cranial nerve XI:  Shoulder shrug is intact.  Cranial nerve XII:  Tongue is midline without evidence of atrophy or fasciculation.    MUSCULOSKELETAL:  SLR negative. Randy's test negative.    MOTOR:  No weakness of knee extension, ankle dorsiflexion, plantarflexion.  Uses a walker to help support herself when she walks.    SENSATION:  Intact throughout the lower extremities.    REFLEXES:  DTR absent in the lower extremities.      Assessment   Osteoporotic compression fracture with minimal compression L1 and L2.  Symptoms of pain beginning to improve 3 weeks after onset.       Plan   Kyphoplasty is an option, but need not be chosen at this point since she is finally seeing improvement in pain 3 weeks after onset.  Recommend continuing a medication as necessary and see me in follow-up in 3 weeks in [place].       Amanuel Cunningham MD

## 2018-08-03 RX ORDER — HYDROCODONE BITARTRATE AND ACETAMINOPHEN 5; 325 MG/1; MG/1
0.5 TABLET ORAL EVERY 6 HOURS PRN
Qty: 20 TABLET | Refills: 0 | Status: SHIPPED | OUTPATIENT
Start: 2018-08-03 | End: 2019-03-07

## 2018-08-13 ENCOUNTER — OFFICE VISIT (OUTPATIENT)
Dept: INTERNAL MEDICINE | Facility: CLINIC | Age: 83
End: 2018-08-13

## 2018-08-13 VITALS
BODY MASS INDEX: 25.78 KG/M2 | SYSTOLIC BLOOD PRESSURE: 110 MMHG | HEART RATE: 77 BPM | TEMPERATURE: 98.8 F | WEIGHT: 151 LBS | DIASTOLIC BLOOD PRESSURE: 60 MMHG | OXYGEN SATURATION: 95 % | HEIGHT: 64 IN

## 2018-08-13 DIAGNOSIS — S32.020A CLOSED COMPRESSION FRACTURE OF SECOND LUMBAR VERTEBRA, INITIAL ENCOUNTER: Primary | ICD-10-CM

## 2018-08-13 DIAGNOSIS — E03.9 ACQUIRED HYPOTHYROIDISM: ICD-10-CM

## 2018-08-13 DIAGNOSIS — E55.9 VITAMIN D DEFICIENCY: ICD-10-CM

## 2018-08-13 DIAGNOSIS — I10 ESSENTIAL HYPERTENSION: ICD-10-CM

## 2018-08-13 LAB — 25(OH)D3+25(OH)D2 SERPL-MCNC: 34.1 NG/ML

## 2018-08-13 PROCEDURE — G0439 PPPS, SUBSEQ VISIT: HCPCS | Performed by: INTERNAL MEDICINE

## 2018-08-13 PROCEDURE — 99397 PER PM REEVAL EST PAT 65+ YR: CPT | Performed by: INTERNAL MEDICINE

## 2018-08-13 NOTE — PATIENT INSTRUCTIONS
Medicare Wellness  Personal Prevention Plan of Service     Date of Office Visit:  2018  Encounter Provider:  Matt Blake MD  Place of Service:  [facility]  Patient Name: Noa Sutton  :  10/7/1926    As part of the Medicare Wellness portion of your visit today, we are providing you with this personalized preventive plan of services (PPPS). This plan is based upon recommendations of the [place] Preventive Services Task Force (USPSTF) and the Advisory Committee on Immunization Practices (ACIP).    This lists the preventive care services that should be considered, and provides dates of when you are due. Items listed as completed are up-to-date and do not require any further intervention.    Health Maintenance   Topic Date Due   • TDAP/TD VACCINES (1 - Tdap) 10/07/1945   • ZOSTER VACCINE (2 of 2) 2013   • MEDICARE ANNUAL WELLNESS  2018   • INFLUENZA VACCINE  2018   • PNEUMOCOCCAL VACCINES (65+ LOW/MEDIUM RISK)  Completed       Orders Placed This Encounter   Procedures   • Vitamin D 25 Hydroxy       No Follow-up on file.

## 2018-08-13 NOTE — PROGRESS NOTES
QUICK REFERENCE INFORMATION:  The ABCs of the Annual Wellness Visit    Initial Medicare Wellness Visit    HEALTH RISK ASSESSMENT  89-year-old female with a recent fall and L1 compression fracture has hypertension and hypothyroidism with reflux esophagitis pain control is significantly better. She is accompanied by her son was giving us some of the history  10/7/1926    Recent Hospitalizations:  No hospitalization(s) within the last year..        Current Medical Providers:  Patient Care Team:  Matt Blake MD as PCP - General  [place] PA-C as Referring Physician (Internal Medicine)        Smoking Status:  History   Smoking Status   • Never Smoker   Smokeless Tobacco   • Never Used       Alcohol Consumption:  History   Alcohol Use No       Depression Screen:   PHQ-2/PHQ-9 Depression Screening 8/13/2018   Little interest or pleasure in doing things 0   Feeling down, depressed, or hopeless 0   Trouble falling or staying asleep, or sleeping too much -   Feeling tired or having little energy -   Poor appetite or overeating -   Feeling bad about yourself - or that you are a failure or have let yourself or your family down -   Trouble concentrating on things, such as reading the newspaper or watching television -   Moving or speaking so slowly that other people could have noticed. Or the opposite - being so fidgety or restless that you have been moving around a lot more than usual -   Thoughts that you would be better off dead, or of hurting yourself in some way -   Total Score 0   If you checked off any problems, how difficult have these problems made it for you to do your work, take care of things at home, or get along with other people? -       Health Habits and Functional and Cognitive Screening:  Functional & Cognitive Status 8/13/2018   Do you have difficulty preparing food and eating? No   Do you have difficulty bathing yourself, getting dressed or grooming yourself? No   Do you have difficulty using  the toilet? No   Do you have difficulty moving around from place to place? Yes   Do you have trouble with steps or getting out of a bed or a chair? Yes   In the past year have you fallen or experienced a near fall? No   Current Diet Well Balanced Diet   Dental Exam Up to date   Eye Exam Up to date   Exercise (times per week) 0 times per week   Current Exercise Activities Include None   Do you need help using the phone?  No   Are you deaf or do you have serious difficulty hearing?  No   Do you need help with transportation? No   Do you need help shopping? Yes   Do you need help preparing meals?  Yes   Do you need help with housework?  Yes   Do you need help with laundry? Yes   Do you need help taking your medications? No   Do you need help managing money? No   Do you ever drive or ride in a car without wearing a seat belt? No   Have you felt unusual stress, anger or loneliness in the last month? Yes   Who do you live with? Alone   If you need help, do you have trouble finding someone available to you? No   Have you been bothered in the last four weeks by sexual problems? No   Do you have difficulty concentrating, remembering or making decisions? Yes           Does the patient have evidence of cognitive impairment? No    Asiprin use counseling: Does not need ASA (and currently is not on it)      Recent Lab Results:    Visual Acuity:  No exam data present    Age-appropriate Screening Schedule:  Refer to the list below for future screening recommendations based on patient's age, sex and/or medical conditions. Orders for these recommended tests are listed in the plan section. The patient has been provided with a written plan.    Health Maintenance   Topic Date Due   • TDAP/TD VACCINES (1 - Tdap) 10/07/1945   • ZOSTER VACCINE (2 of 2) 06/18/2013   • INFLUENZA VACCINE  08/01/2018   • PNEUMOCOCCAL VACCINES (65+ LOW/MEDIUM RISK)  Completed        Subjective   History of Present Illness    Noa Sutton is a 89 y.o.  female who presents for an Annual Wellness Visit.    The following portions of the patient's history were reviewed and updated as appropriate: allergies, current medications, past family history, past medical history, past social history, past surgical history and problem list.    Outpatient Medications Prior to Visit   Medication Sig Dispense Refill   • amLODIPine-benazepril (LOTREL) 10-20 MG per capsule TAKE 1 CAPSULE DAILY 90 capsule 3   • baclofen (LIORESAL) 10 MG tablet Take 0.5 tablets by mouth 3 (Three) Times a Day. 15 tablet 1   • Cholecalciferol (VITAMIN D3) 1000 UNITS capsule Take 1 capsule by mouth daily.     • HYDROcodone-acetaminophen (NORCO) 5-325 MG per tablet Take 0.5 tablets by mouth Every 6 (Six) Hours As Needed for Severe Pain . 20 tablet 0   • levothyroxine (SYNTHROID, LEVOTHROID) 100 MCG tablet TAKE 1 TABLET DAILY 90 tablet 3   • metoprolol tartrate (LOPRESSOR) 25 MG tablet TAKE 1 TABLET TWICE A  tablet 3   • omeprazole (priLOSEC) 20 MG capsule TAKE 1 CAPSULE DAILY 90 capsule 3     No facility-administered medications prior to visit.        Patient Active Problem List   Diagnosis   • Essential hypertension   • Acquired hypothyroidism   • Vitamin D deficiency   • Closed compression fracture of L1 lumbar vertebra (CMS/HCC)   • Closed compression fracture of L2 lumbar vertebra (CMS/HCC)       Advance Care Planning:  has an advance directive - a copy HAS NOT been provided    Identification of Risk Factors:  Risk factors include: increased fall risk, chronic pain, hearing limitations and polypharmacy.    Review of Systems   Constitutional: Negative.  Negative for activity change, appetite change, fatigue and fever.   HENT: Negative for congestion, ear discharge, ear pain and trouble swallowing.    Eyes: Negative for photophobia and visual disturbance.   Respiratory: Negative for cough and shortness of breath.    Cardiovascular: Negative for chest pain and palpitations.   Gastrointestinal:  Negative for abdominal distention, abdominal pain, constipation, diarrhea, nausea and vomiting.   Endocrine: Negative.    Genitourinary: Negative for dysuria, hematuria and urgency.   Musculoskeletal: Positive for arthralgias, back pain and gait problem. Negative for joint swelling and myalgias.   Skin: Negative for color change and rash.   Allergic/Immunologic: Negative.    Neurological: Positive for numbness. Negative for dizziness, weakness, light-headedness and headaches.   Hematological: Negative for adenopathy. Does not bruise/bleed easily.   Psychiatric/Behavioral: Positive for sleep disturbance. Negative for agitation, confusion and dysphoric mood. The patient is not nervous/anxious.        Compared to one year ago, the patient feels her physical health is the same.  Compared to one year ago, the patient feels her mental health is the same.    Objective     Physical Exam   Constitutional: She is oriented to person, place, and time. She appears well-developed and well-nourished. No distress.   HENT:   Nose: Nose normal.   Mouth/Throat: Oropharynx is clear and moist.   Eyes: Conjunctivae and EOM are normal. No scleral icterus.   Neck: No tracheal deviation present. No thyromegaly present.   Cardiovascular: Normal rate and regular rhythm.  Exam reveals no friction rub.    No murmur heard.  Pulmonary/Chest: No respiratory distress. She has no wheezes. She has no rales.   Abdominal: Soft. She exhibits no distension and no mass. There is no tenderness. There is no guarding.   Musculoskeletal: Normal range of motion. She exhibits tenderness and deformity. She exhibits no edema.   Lymphadenopathy:     She has no cervical adenopathy.   Neurological: She is alert and oriented to person, place, and time. She has normal reflexes. No cranial nerve deficit. Coordination normal.   Skin: Skin is warm and dry. No rash noted. No erythema.   Psychiatric: She has a normal mood and affect. Her behavior is normal. Judgment and  "thought content normal.       Vitals:    08/13/18 1401   BP: 110/60   Pulse: 77   Temp: 98.8 °F (37.1 °C)   SpO2: 95%   Weight: 68.5 kg (151 lb)   Height: 162.6 cm (64.02\")       Patient's Body mass index is 25.9 kg/m². BMI is within normal parameters. No follow-up required.      Assessment/Plan   Patient Self-Management and Personalized Health Advice  The patient has been provided with information about: diet, exercise and fall prevention and preventive services including:   · Fall Risk assessment done, Fall Risk plan of care done, Urinary Incontinence assessment done.    Visit Diagnoses:    ICD-10-CM ICD-9-CM   1. Closed compression fracture of second lumbar vertebra, initial encounter (CMS/[facility]) continue with conservative measures pain control okay  S32.020A 805.4   2. Acquired hypothyroidism. Clinically euthyroid  E03.9 244.9   3. Vitamin D deficiency. Check vitamin D level continue with supplements  E55.9 268.9   4. Essential hypertension. Stable  I10 401.9       No orders of the defined types were placed in this encounter.      Outpatient Encounter Prescriptions as of 8/13/2018   Medication Sig Dispense Refill   • amLODIPine-benazepril (LOTREL) 10-20 MG per capsule TAKE 1 CAPSULE DAILY 90 capsule 3   • baclofen (LIORESAL) 10 MG tablet Take 0.5 tablets by mouth 3 (Three) Times a Day. 15 tablet 1   • Cholecalciferol (VITAMIN D3) 1000 UNITS capsule Take 1 capsule by mouth daily.     • HYDROcodone-acetaminophen (NORCO) 5-325 MG per tablet Take 0.5 tablets by mouth Every 6 (Six) Hours As Needed for Severe Pain . 20 tablet 0   • levothyroxine (SYNTHROID, LEVOTHROID) 100 MCG tablet TAKE 1 TABLET DAILY 90 tablet 3   • metoprolol tartrate (LOPRESSOR) 25 MG tablet TAKE 1 TABLET TWICE A  tablet 3   • omeprazole (priLOSEC) 20 MG capsule TAKE 1 CAPSULE DAILY 90 capsule 3     No facility-administered encounter medications on file as of 8/13/2018.        Reviewed use of high risk medication in the elderly: " yes  Reviewed for potential of harmful drug interactions in the elderly: yes    Follow Up:  No Follow-up on file.     An After Visit Summary and PPPS with all of these plans were given to the patient.

## 2018-08-21 ENCOUNTER — OFFICE VISIT (OUTPATIENT)
Dept: NEUROSURGERY | Facility: CLINIC | Age: 83
End: 2018-08-21

## 2018-08-21 DIAGNOSIS — S32.010A CLOSED COMPRESSION FRACTURE OF FIRST LUMBAR VERTEBRA, INITIAL ENCOUNTER: Primary | ICD-10-CM

## 2018-08-21 DIAGNOSIS — S32.020A CLOSED COMPRESSION FRACTURE OF SECOND LUMBAR VERTEBRA, INITIAL ENCOUNTER: ICD-10-CM

## 2018-08-21 PROCEDURE — 99213 OFFICE O/P EST LOW 20 MIN: CPT | Performed by: NEUROLOGICAL SURGERY

## 2018-08-21 NOTE — PROGRESS NOTES
Subjective   Noa Sutton is a 89 y.o. female who presents for follow up of  L1 and L2 osteoporotic compression fracture.     The patient developed severe back pain 6 weeks ago spontaneously after lifting an object and was found to have signal change on T1 on her lumbar MRI scan in the anterior upper L1 vertebral body in the lower L2 vertebral body consistent with a osteoporotic compression fracture.  She has a past history of severe pain 6 years ago and compression fracture T12 and then to complete resorption of the T12 vertebral body.    Since I saw her 3 weeks ago her pain has improved noticeably.  She can now walk about a block.  She is using a cane.  She uses Tylenol, but no narcotics.    The following portions of the patient's history were reviewed, updated as appropriate and approved: allergies, current medications, past family history, past medical history, past social history, past surgical history, review of systems and problem list.     Review of Systems   Constitutional: Positive for activity change, appetite change and fatigue. Negative for chills, diaphoresis, fever and unexpected weight change.   HENT: Negative for congestion, dental problem, drooling, ear discharge, ear pain, facial swelling, hearing loss, mouth sores, nosebleeds, postnasal drip, rhinorrhea, sinus pressure, sneezing, sore throat, tinnitus, trouble swallowing and voice change.    Eyes: Positive for photophobia. Negative for pain, discharge, redness, itching and visual disturbance.   Respiratory: Negative for apnea, cough, choking, chest tightness, shortness of breath, wheezing and stridor.    Cardiovascular: Negative for chest pain, palpitations and leg swelling.   Gastrointestinal: Negative for abdominal distention, abdominal pain, anal bleeding, blood in stool, constipation, diarrhea, nausea, rectal pain and vomiting.   Endocrine: Positive for polyuria. Negative for cold intolerance, heat intolerance, polydipsia and  polyphagia.   Genitourinary: Positive for decreased urine volume, flank pain and frequency. Negative for difficulty urinating, dysuria, enuresis, genital sores, hematuria and urgency.   Musculoskeletal: Positive for arthralgias, back pain, gait problem, joint swelling, myalgias and neck stiffness. Negative for neck pain.   Skin: Negative for color change, pallor, rash and wound.   Allergic/Immunologic: Negative for environmental allergies, food allergies and immunocompromised state.   Neurological: Positive for weakness. Negative for dizziness, tremors, seizures, syncope, facial asymmetry, speech difficulty, light-headedness, numbness and headaches.   Hematological: Negative for adenopathy. Does not bruise/bleed easily.   Psychiatric/Behavioral: Negative for agitation, behavioral problems, confusion, decreased concentration, dysphoric mood, hallucinations, self-injury, sleep disturbance and suicidal ideas. The patient is nervous/anxious. The patient is not hyperactive.    All other systems reviewed and are negative.      Objective    NEUROLOGICAL EXAMINATION:    No motor weakness or sensory loss and lower extremities.  Absent reflexes in lower extremities.    Assessment   Osteoporotic pathologic compression fracture with minimal compression L1 and L2 6 weeks ago.  Symptoms improved over the past 3 weeks.       Plan   Continue to let these fractures heal spontaneously.  Follow-up in 6 weeks in [place] with x-ray AP and lateral lumbar spine.       Amanuel Cunningham MD

## 2018-09-25 ENCOUNTER — HOSPITAL ENCOUNTER (OUTPATIENT)
Dept: GENERAL RADIOLOGY | Facility: HOSPITAL | Age: 83
Discharge: HOME | End: 2018-09-25
Admitting: NEUROLOGICAL SURGERY

## 2018-09-25 DIAGNOSIS — S32.020A CLOSED COMPRESSION FRACTURE OF SECOND LUMBAR VERTEBRA, INITIAL ENCOUNTER: ICD-10-CM

## 2018-09-25 DIAGNOSIS — S32.010A CLOSED COMPRESSION FRACTURE OF FIRST LUMBAR VERTEBRA, INITIAL ENCOUNTER: ICD-10-CM

## 2018-09-25 PROCEDURE — 72100 X-RAY EXAM L-S SPINE 2/3 VWS: CPT

## 2018-10-02 ENCOUNTER — OFFICE VISIT (OUTPATIENT)
Dept: NEUROSURGERY | Facility: CLINIC | Age: 83
End: 2018-10-02

## 2018-10-02 VITALS — HEIGHT: 64 IN

## 2018-10-02 DIAGNOSIS — M51.36 DDD (DEGENERATIVE DISC DISEASE), LUMBAR: ICD-10-CM

## 2018-10-02 DIAGNOSIS — S32.010A CLOSED COMPRESSION FRACTURE OF FIRST LUMBAR VERTEBRA, INITIAL ENCOUNTER: Primary | ICD-10-CM

## 2018-10-02 DIAGNOSIS — S32.020A CLOSED COMPRESSION FRACTURE OF SECOND LUMBAR VERTEBRA, INITIAL ENCOUNTER: ICD-10-CM

## 2018-10-02 DIAGNOSIS — M41.20 SCOLIOSIS (AND KYPHOSCOLIOSIS), IDIOPATHIC: ICD-10-CM

## 2018-10-02 PROCEDURE — 99213 OFFICE O/P EST LOW 20 MIN: CPT | Performed by: NEUROLOGICAL SURGERY

## 2018-10-02 NOTE — PROGRESS NOTES
Subjective   Noa Sutton is a 89 y.o. female who presents for follow up of  L1 and L2 osteoporotic compression fracture.     The patient is a 89-year-old woman who developed severe back pain 12 weeks ago spontaneously after lifting an object.  MRI scan showed signal change in the upper vertebral body of T1 and the lower vertebral body of T2.  She had an old compression fracture also at T12 from 6 years previously that had completely resorbed the vertebral body.    Pain improved progressively after the initial fracture, and now has further improved since I saw her on 8/21/18.  She uses a cane.  She is able to walk reasonable distances, including shopping with a cart.  She feels fatigued easily or weak, but the pain is not a limiting factor.    Lumbar x-rays show about a 25 or 30% compression of L1 now which is a slight progression over the initial fracture, but it should be stable now since it has been about 12 weeks since onset.  The L2 vertebral body has not shown any more sign of further narrowing of the vertebral body height.    The following portions of the patient's history were reviewed, updated as appropriate and approved: allergies, current medications, past family history, past medical history, past social history, past surgical history, review of systems and problem list.     Review of Systems   Constitutional: Positive for activity change, appetite change and fatigue. Negative for chills, diaphoresis, fever and unexpected weight change.   HENT: Negative for congestion, dental problem, drooling, ear discharge, ear pain, facial swelling, hearing loss, mouth sores, nosebleeds, postnasal drip, rhinorrhea, sinus pressure, sneezing, sore throat, tinnitus, trouble swallowing and voice change.    Eyes: Positive for photophobia. Negative for pain, discharge, redness, itching and visual disturbance.   Respiratory: Negative for apnea, cough, choking, chest tightness, shortness of breath, wheezing and  stridor.    Cardiovascular: Negative for chest pain, palpitations and leg swelling.   Gastrointestinal: Negative for abdominal distention, abdominal pain, anal bleeding, blood in stool, constipation, diarrhea, nausea, rectal pain and vomiting.   Endocrine: Positive for polyuria. Negative for cold intolerance, heat intolerance, polydipsia and polyphagia.   Genitourinary: Positive for decreased urine volume, flank pain and frequency. Negative for difficulty urinating, dysuria, enuresis, genital sores, hematuria and urgency.   Musculoskeletal: Positive for arthralgias, back pain, gait problem, joint swelling, myalgias and neck stiffness. Negative for neck pain.   Skin: Negative for color change, pallor, rash and wound.   Allergic/Immunologic: Negative for environmental allergies, food allergies and immunocompromised state.   Neurological: Positive for weakness. Negative for dizziness, tremors, seizures, syncope, facial asymmetry, speech difficulty, light-headedness, numbness and headaches.   Hematological: Negative for adenopathy. Does not bruise/bleed easily.   Psychiatric/Behavioral: Negative for agitation, behavioral problems, confusion, decreased concentration, dysphoric mood, hallucinations, self-injury, sleep disturbance and suicidal ideas. The patient is nervous/anxious. The patient is not hyperactive.    All other systems reviewed and are negative.      Objective    NEUROLOGICAL EXAMINATION:    No motor or sensory loss and lower extremities.  Absent reflexes in lower extremities.    Assessment   L1 osteoporotic compression fracture, 12 weeks old, 25-30% loss of vertebral body height, now appears stable and pain is not a significant problem.  Minor compression fracture inferior vertebral body of L2 without evidence of vertebral body height loss.       Plan   Kyphoplasty is not necessary.  No further follow-up necessary.       Amanuel Cunningham MD

## 2018-12-04 RX ORDER — AMLODIPINE BESYLATE AND BENAZEPRIL HYDROCHLORIDE 10; 20 MG/1; MG/1
CAPSULE ORAL
Qty: 90 CAPSULE | Refills: 1 | Status: SHIPPED | OUTPATIENT
Start: 2018-12-04 | End: 2019-06-02 | Stop reason: SDUPTHER

## 2019-03-07 ENCOUNTER — OFFICE VISIT (OUTPATIENT)
Dept: INTERNAL MEDICINE | Facility: CLINIC | Age: 84
End: 2019-03-07

## 2019-03-07 VITALS
SYSTOLIC BLOOD PRESSURE: 144 MMHG | HEIGHT: 64 IN | TEMPERATURE: 98.1 F | OXYGEN SATURATION: 96 % | HEART RATE: 72 BPM | DIASTOLIC BLOOD PRESSURE: 71 MMHG | BODY MASS INDEX: 26.8 KG/M2 | WEIGHT: 157 LBS

## 2019-03-07 DIAGNOSIS — I10 ESSENTIAL HYPERTENSION: Primary | ICD-10-CM

## 2019-03-07 DIAGNOSIS — S32.020D CLOSED COMPRESSION FRACTURE OF L2 LUMBAR VERTEBRA WITH ROUTINE HEALING, SUBSEQUENT ENCOUNTER: ICD-10-CM

## 2019-03-07 DIAGNOSIS — R73.9 HYPERGLYCEMIA: ICD-10-CM

## 2019-03-07 DIAGNOSIS — E03.9 ACQUIRED HYPOTHYROIDISM: ICD-10-CM

## 2019-03-07 LAB
ALBUMIN SERPL-MCNC: 4.3 G/DL (ref 3.5–5)
ALBUMIN/GLOB SERPL: 1.5 G/DL (ref 1–2)
ALP SERPL-CCNC: 152 U/L (ref 38–126)
ALT SERPL-CCNC: 25 U/L (ref 13–69)
AST SERPL-CCNC: 30 U/L (ref 15–46)
BILIRUB SERPL-MCNC: 0.5 MG/DL (ref 0.2–1.3)
BUN SERPL-MCNC: 17 MG/DL (ref 7–20)
BUN/CREAT SERPL: 24.3 (ref 7.1–23.5)
CALCIUM SERPL-MCNC: 10.1 MG/DL (ref 8.4–10.2)
CHLORIDE SERPL-SCNC: 102 MMOL/L (ref 98–107)
CO2 SERPL-SCNC: 28 MMOL/L (ref 26–30)
CREAT SERPL-MCNC: 0.7 MG/DL (ref 0.6–1.3)
GLOBULIN SER CALC-MCNC: 2.8 GM/DL
GLUCOSE SERPL-MCNC: 93 MG/DL (ref 74–98)
HBA1C MFR BLD: 5.7 %
POTASSIUM SERPL-SCNC: 4.9 MMOL/L (ref 3.5–5.1)
PROT SERPL-MCNC: 7.1 G/DL (ref 6.3–8.2)
SODIUM SERPL-SCNC: 139 MMOL/L (ref 137–145)

## 2019-03-07 PROCEDURE — 99214 OFFICE O/P EST MOD 30 MIN: CPT | Performed by: INTERNAL MEDICINE

## 2019-03-07 NOTE — PROGRESS NOTES
"Subjective  Noa Sutton is a 89 y.o. female    HPI coming in for follow-up patient with hypertension and hypothyroidism has reflux esophagitis had compression fracture of her lumbar spine appears to be healing gradually.  Now ambulating without much difficulty denies significant pain takes Tylenol as needed    The following portions of the patient's history were reviewed and updated as appropriate: allergies, current medications, past family history, past medical history, past social history, past surgical history, and problem list.     Review of Systems   Constitutional: Negative.  Negative for activity change, appetite change, fatigue and fever.   HENT: Negative for congestion, ear discharge, ear pain and trouble swallowing.    Eyes: Negative for photophobia and visual disturbance.   Respiratory: Negative for cough and shortness of breath.    Cardiovascular: Negative for chest pain and palpitations.   Gastrointestinal: Negative for abdominal distention, abdominal pain, constipation, diarrhea, nausea and vomiting.   Endocrine: Negative.    Genitourinary: Negative for dysuria, hematuria and urgency.   Musculoskeletal: Positive for arthralgias and back pain. Negative for joint swelling and myalgias.   Skin: Negative for color change and rash.   Allergic/Immunologic: Negative.    Neurological: Negative for dizziness, weakness, light-headedness and headaches.   Hematological: Negative for adenopathy. Does not bruise/bleed easily.   Psychiatric/Behavioral: Positive for sleep disturbance. Negative for agitation, confusion and dysphoric mood. The patient is not nervous/anxious.        Visit Vitals  /71 Comment: AUTOMATIC   Pulse 72   Temp 98.1 °F (36.7 °C) (Temporal)   Ht 162.6 cm (64.02\")   Wt 71.2 kg (157 lb)   SpO2 96%   BMI 26.93 kg/m²       Objective  Physical Exam   Constitutional: She is oriented to person, place, and time. She appears well-developed and well-nourished. No distress.   HENT:   Nose: " Nose normal.   Mouth/Throat: Oropharynx is clear and moist.   Eyes: Conjunctivae and EOM are normal. No scleral icterus.   Neck: No tracheal deviation present. No thyromegaly present.   Cardiovascular: Normal rate and regular rhythm. Exam reveals no friction rub.   No murmur heard.  Pulmonary/Chest: No respiratory distress. She has no wheezes. She has no rales.   Abdominal: Soft. She exhibits no distension and no mass. There is no tenderness. There is no guarding.   Musculoskeletal: Normal range of motion. She exhibits deformity.   Lymphadenopathy:     She has no cervical adenopathy.   Neurological: She is alert and oriented to person, place, and time. She has normal reflexes. No cranial nerve deficit. Coordination normal.   Skin: Skin is warm and dry. No rash noted. No erythema.   Psychiatric: She has a normal mood and affect. Her behavior is normal. Judgment and thought content normal.       Diagnoses and all orders for this visit:    Essential hypertension stable with current meds and low-salt diet cardiovascular exercise    Acquired hypothyroidism clinically euthyroid follow TSH    Closed compression fracture of L2 lumbar vertebra with routine healing, subsequent encounter continue with home exercises Tylenol as needed

## 2019-04-29 RX ORDER — OMEPRAZOLE 20 MG/1
CAPSULE, DELAYED RELEASE ORAL
Qty: 90 CAPSULE | Refills: 3 | Status: SHIPPED | OUTPATIENT
Start: 2019-04-29 | End: 2020-08-21

## 2019-06-03 RX ORDER — AMLODIPINE BESYLATE AND BENAZEPRIL HYDROCHLORIDE 10; 20 MG/1; MG/1
CAPSULE ORAL
Qty: 90 CAPSULE | Refills: 1 | Status: SHIPPED | OUTPATIENT
Start: 2019-06-03 | End: 2019-11-30 | Stop reason: SDUPTHER

## 2019-06-03 RX ORDER — LEVOTHYROXINE SODIUM 0.1 MG/1
TABLET ORAL
Qty: 90 TABLET | Refills: 0 | Status: SHIPPED | OUTPATIENT
Start: 2019-06-03 | End: 2019-09-01 | Stop reason: SDUPTHER

## 2019-09-03 RX ORDER — LEVOTHYROXINE SODIUM 0.1 MG/1
TABLET ORAL
Qty: 90 TABLET | Refills: 4 | Status: SHIPPED | OUTPATIENT
Start: 2019-09-03 | End: 2020-11-24 | Stop reason: SDUPTHER

## 2019-09-09 ENCOUNTER — OFFICE VISIT (OUTPATIENT)
Dept: INTERNAL MEDICINE | Facility: CLINIC | Age: 84
End: 2019-09-09

## 2019-09-09 VITALS
WEIGHT: 155.8 LBS | OXYGEN SATURATION: 95 % | HEIGHT: 64 IN | TEMPERATURE: 99.1 F | DIASTOLIC BLOOD PRESSURE: 65 MMHG | HEART RATE: 68 BPM | SYSTOLIC BLOOD PRESSURE: 134 MMHG | BODY MASS INDEX: 26.6 KG/M2

## 2019-09-09 DIAGNOSIS — E55.9 VITAMIN D DEFICIENCY: ICD-10-CM

## 2019-09-09 DIAGNOSIS — I10 ESSENTIAL HYPERTENSION: ICD-10-CM

## 2019-09-09 DIAGNOSIS — E03.9 ACQUIRED HYPOTHYROIDISM: ICD-10-CM

## 2019-09-09 DIAGNOSIS — Z23 NEED FOR INFLUENZA VACCINATION: Primary | ICD-10-CM

## 2019-09-09 PROCEDURE — 99397 PER PM REEVAL EST PAT 65+ YR: CPT | Performed by: INTERNAL MEDICINE

## 2019-09-09 PROCEDURE — 90653 IIV ADJUVANT VACCINE IM: CPT | Performed by: INTERNAL MEDICINE

## 2019-09-09 PROCEDURE — G0008 ADMIN INFLUENZA VIRUS VAC: HCPCS | Performed by: INTERNAL MEDICINE

## 2019-09-09 PROCEDURE — G0439 PPPS, SUBSEQ VISIT: HCPCS | Performed by: INTERNAL MEDICINE

## 2019-09-09 NOTE — PATIENT INSTRUCTIONS
Medicare Wellness  Personal Prevention Plan of Service     Date of Office Visit:  2019  Encounter Provider:  Matt Blake MD  Place of Service:  [facility]  Patient Name: Noa Sutton  :  10/7/1926    As part of the Medicare Wellness portion of your visit today, we are providing you with this personalized preventive plan of services (PPPS). This plan is based upon recommendations of the [place] Preventive Services Task Force (USPSTF) and the Advisory Committee on Immunization Practices (ACIP).    This lists the preventive care services that should be considered, and provides dates of when you are due. Items listed as completed are up-to-date and do not require any further intervention.    Health Maintenance   Topic Date Due   • INFLUENZA VACCINE  2019   • TDAP/TD VACCINES (1 - Tdap) 2020 (Originally 10/7/1945)   • ZOSTER VACCINE (2 of 2) 2029 (Originally 2013)   • MEDICARE ANNUAL WELLNESS  2020   • PNEUMOCOCCAL VACCINES (65+ LOW/MEDIUM RISK)  Completed       Orders Placed This Encounter   Procedures   • Fluad Tri 65yr ([id number])       No Follow-up on file.

## 2019-09-09 NOTE — PROGRESS NOTES
The ABCs of the Annual Wellness Visit  Subsequent Medicare Wellness Visit    Chief Complaint   Patient presents with   • Medicare Wellness-subsequent       Subjective   History of Present Illness:  Noa Sutton is a 89 y.o. female with hypertension, hypothyroidism, and vitamin D deficiency who presents for a Subsequent Medicare Wellness Visit.     HEALTH RISK ASSESSMENT    Recent Hospitalizations:  No hospitalization(s) within the last year.    Current Medical Providers:  Patient Care Team:  Matt Blake MD as PCP - Carmen Jose PA-C as Referring Physician (Internal Medicine)    Smoking Status:  Social History     Tobacco Use   Smoking Status Never Smoker   Smokeless Tobacco Never Used       Alcohol Consumption:  Social History     Substance and Sexual Activity   Alcohol Use No       Depression Screen:   PHQ-2/PHQ-9 Depression Screening 9/9/2019   Little interest or pleasure in doing things 0   Feeling down, depressed, or hopeless 0   Trouble falling or staying asleep, or sleeping too much -   Feeling tired or having little energy -   Poor appetite or overeating -   Feeling bad about yourself - or that you are a failure or have let yourself or your family down -   Trouble concentrating on things, such as reading the newspaper or watching television -   Moving or speaking so slowly that other people could have noticed. Or the opposite - being so fidgety or restless that you have been moving around a lot more than usual -   Thoughts that you would be better off dead, or of hurting yourself in some way -   Total Score 0   If you checked off any problems, how difficult have these problems made it for you to do your work, take care of things at home, or get along with other people? -       Fall Risk Screen:  STEADI Fall Risk Assessment was completed, and patient is at MODERATE risk for falls. Assessment completed on:9/9/2019    Health Habits and Functional and Cognitive Screening:  Functional  & Cognitive Status 9/9/2019   Do you have difficulty preparing food and eating? No   Do you have difficulty bathing yourself, getting dressed or grooming yourself? No   Do you have difficulty using the toilet? No   Do you have difficulty moving around from place to place? No   Do you have trouble with steps or getting out of a bed or a chair? No   Current Diet Well Balanced Diet   Dental Exam Unknown   Eye Exam Up to date   Exercise (times per week) 7 times per week   Current Exercise Activities Include Aerobics   Do you need help using the phone?  No   Are you deaf or do you have serious difficulty hearing?  No   Do you need help with transportation? No   Do you need help shopping? No   Do you need help preparing meals?  No   Do you need help with housework?  No   Do you need help with laundry? No   Do you need help taking your medications? No   Do you need help managing money? No   Do you ever drive or ride in a car without wearing a seat belt? No   Have you felt unusual stress, anger or loneliness in the last month? No   Who do you live with? Alone   If you need help, do you have trouble finding someone available to you? No   Have you been bothered in the last four weeks by sexual problems? No   Do you have difficulty concentrating, remembering or making decisions? No         Does the patient have evidence of cognitive impairment? Yes    Asprin use counseling:Does not need ASA (and currently is not on it)    Age-appropriate Screening Schedule:  Refer to the list below for future screening recommendations based on patient's age, sex and/or medical conditions. Orders for these recommended tests are listed in the plan section. The patient has been provided with a written plan.    Health Maintenance   Topic Date Due   • INFLUENZA VACCINE  08/01/2019   • TDAP/TD VACCINES (1 - Tdap) 09/09/2020 (Originally 10/7/1945)   • ZOSTER VACCINE (2 of 2) 09/09/2029 (Originally 6/18/2013)   • PNEUMOCOCCAL VACCINES (65+ LOW/MEDIUM  RISK)  Completed          The following portions of the patient's history were reviewed and updated as appropriate: allergies, current medications, past family history, past medical history, past social history, past surgical history and problem list.    Outpatient Medications Prior to Visit   Medication Sig Dispense Refill   • amLODIPine-benazepril (LOTREL) 10-20 MG per capsule TAKE 1 CAPSULE DAILY 90 capsule 1   • Cholecalciferol (VITAMIN D3) 1000 UNITS capsule Take 1 capsule by mouth daily.     • levothyroxine (SYNTHROID, LEVOTHROID) 100 MCG tablet TAKE 1 TABLET DAILY 90 tablet 4   • metoprolol tartrate (LOPRESSOR) 25 MG tablet TAKE 1 TABLET TWICE A  tablet 1   • omeprazole (priLOSEC) 20 MG capsule TAKE 1 CAPSULE DAILY 90 capsule 3     No facility-administered medications prior to visit.        Patient Active Problem List   Diagnosis   • Essential hypertension   • Acquired hypothyroidism   • Vitamin D deficiency   • Closed compression fracture of L1 lumbar vertebra (CMS/HCC)   • Closed compression fracture of L2 lumbar vertebra (CMS/HCC)       Advanced Care Planning:  Patient has an advance directive - a copy has not been provided. Have asked the patient to send this to us to add to record    Review of Systems   Constitutional: Negative for appetite change, fatigue and fever.   Eyes: Negative for visual disturbance.   Respiratory: Negative for cough and shortness of breath.    Cardiovascular: Negative for chest pain, palpitations and leg swelling.   Gastrointestinal: Positive for constipation. Negative for abdominal pain, diarrhea and nausea.   Genitourinary: Negative for dysuria.   Musculoskeletal: Positive for back pain.   Skin: Negative for rash.   Neurological: Negative for dizziness, weakness and headaches.   Psychiatric/Behavioral: Negative for dysphoric mood. The patient is not nervous/anxious.        Compared to one year ago, the patient feels her physical health is the same.  Compared to one year  "ago, the patient feels her mental health is the same.    Reviewed chart for potential of high risk medication in the elderly: yes  Reviewed chart for potential of harmful drug interactions in the elderly:yes    Objective         Vitals:    09/09/19 1400   BP: 134/65  Comment: Automatic   Pulse: 68   Temp: 99.1 °F (37.3 °C)   TempSrc: Temporal   SpO2: 95%   Weight: 70.7 kg (155 lb 12.8 oz)   Height: 162.6 cm (64.02\")   PainSc: 0-No pain       Body mass index is 26.73 kg/m².  Discussed the patient's BMI with her. The BMI is in the acceptable range.    Physical Exam   Constitutional: She is oriented to person, place, and time. She appears well-developed and well-nourished. No distress.   Eyes: Pupils are equal, round, and reactive to light.   Cardiovascular: Normal rate, regular rhythm, normal heart sounds and intact distal pulses.   No murmur heard.  Pulmonary/Chest: Effort normal and breath sounds normal. No respiratory distress. She has no wheezes.   Abdominal: Soft. Bowel sounds are normal. She exhibits no distension. There is no tenderness.   Musculoskeletal: Normal range of motion. She exhibits deformity. She exhibits no tenderness.   Neurological: She is alert and oriented to person, place, and time.   Skin: Skin is warm and dry. Capillary refill takes less than 2 seconds. No erythema.   Psychiatric: She has a normal mood and affect. Her behavior is normal. Judgment and thought content normal.   Nursing note and vitals reviewed.            Assessment/Plan   Medicare Risks and Personalized Health Plan  CMS Preventative Services Quick Reference  Advance Directive Discussion  Chronic Pain   Fall Risk  Immunizations Discussed/Encouraged (specific immunizations; Influenza )    The above risks/problems have been discussed with the patient.  Pertinent information has been shared with the patient in the After Visit Summary.  Follow up plans and orders are seen below in the Assessment/Plan Section.    Diagnoses and all " orders for this visit:    1. Need for influenza vaccination (Primary)  -     Fluad Tri 65yr ([id number])    2. Essential hypertension  - Stable. Continue current medication regimen. Encouraged low-salt diet and walking for exercise.     3. Vitamin D deficiency  - Last vitamin D level was normal. Continue on vitamin D supplementation.     4. Acquired hypothyroidism  - Clinically euthyroid. Stable on Synthroid.     Follow Up:  No Follow-up on file.     An After Visit Summary and PPPS were given to the patient.       1.  This note accurately reflects work and decisions made by me.

## 2019-12-02 RX ORDER — AMLODIPINE BESYLATE AND BENAZEPRIL HYDROCHLORIDE 10; 20 MG/1; MG/1
CAPSULE ORAL
Qty: 90 CAPSULE | Refills: 4 | Status: SHIPPED | OUTPATIENT
Start: 2019-12-02 | End: 2021-02-22

## 2020-04-08 ENCOUNTER — TELEPHONE (OUTPATIENT)
Dept: INTERNAL MEDICINE | Facility: CLINIC | Age: 85
End: 2020-04-08

## 2020-04-08 NOTE — TELEPHONE ENCOUNTER
Pts son called and statted pt fell about 4 days ago and she is having a lot of pain in her right hip, across her back, and her ribs. Pts son is requesting a call back to discuss what they can do for pt or if she can get anything for pain. Please call back at [phone]

## 2020-04-09 ENCOUNTER — OFFICE VISIT (OUTPATIENT)
Dept: INTERNAL MEDICINE | Facility: CLINIC | Age: 85
End: 2020-04-09

## 2020-04-09 DIAGNOSIS — M25.551 PAIN OF RIGHT HIP JOINT: Primary | ICD-10-CM

## 2020-04-09 PROCEDURE — 99443 PR PHYS/QHP TELEPHONE EVALUATION 21-30 MIN: CPT | Performed by: INTERNAL MEDICINE

## 2020-04-09 RX ORDER — HYDROCODONE BITARTRATE AND ACETAMINOPHEN 5; 325 MG/1; MG/1
0.5 TABLET ORAL 2 TIMES DAILY PRN
Qty: 10 TABLET | Refills: 0 | Status: SHIPPED | OUTPATIENT
Start: 2020-04-09 | End: 2020-06-15

## 2020-04-09 NOTE — TELEPHONE ENCOUNTER
Called patient and patient's son with no answer, left a voicemail for a call back to see if we could set up a telephone or video visit with patient.

## 2020-04-09 NOTE — PROGRESS NOTES
Subjective  Noa Sutton is a 89 y.o. female    HPI telephone visit requested by patient for persisting right hip pain after a fall about a week ago.  It was advised that her son be with her at the time of the phone visit.  Patient and her son were present when I made the phone call as requested by them.  Patient tripped over her dog about a week ago and while falling hit the right side of her chest to a chair and subsequently down onto the floor.  She did not lose consciousness since then has been complaining of right hip and buttock pain along with some discomfort over her right chest region.  She denies cough or hemoptysis.  Her pain is gradually getting better however says it still makes her uncomfortable when laying down making it difficult for her to sleep well.  She denies fever or chills.  No alcohol use  She has hypertension and hypothyroidism.  She is compliant with her meds    The following portions of the patient's history were reviewed and updated as appropriate: allergies, current medications, past family history, past medical history, past social history, past surgical history, and problem list.     Review of Systems   Constitutional: Positive for fatigue. Negative for activity change, appetite change and fever.   HENT: Negative for congestion, ear discharge, ear pain and trouble swallowing.    Eyes: Negative for photophobia and visual disturbance.   Respiratory: Negative for cough and shortness of breath.    Cardiovascular: Positive for chest pain. Negative for palpitations.   Gastrointestinal: Negative for abdominal distention, abdominal pain, constipation, diarrhea, nausea and vomiting.   Endocrine: Negative.    Genitourinary: Negative for dysuria, hematuria and urgency.   Musculoskeletal: Positive for back pain and gait problem. Negative for arthralgias, joint swelling and myalgias.   Skin: Negative for color change and rash.   Allergic/Immunologic: Negative.    Neurological: Negative for  dizziness, weakness, light-headedness and headaches.   Hematological: Negative for adenopathy. Does not bruise/bleed easily.   Psychiatric/Behavioral: Positive for sleep disturbance. Negative for agitation, confusion and dysphoric mood. The patient is not nervous/anxious.        There were no vitals taken for this visit.    Objective  Physical Exam   Constitutional:   She did not appear to be in distress over the phone.  She was answering appropriately.  Denied fever or chills   Musculoskeletal:   She was able to bear weight on her right leg while lifting up her left leg and using support of a walker.  During the visit she was able to get up from a sitting position with minimal assistance   Skin:   Her son noticed a bruise about 3 cm in diameter on the left side of her waist       Diagnoses and all orders for this visit:    Pain of right hip joint after an accidental fall along with this has some bruising over her right ribs.  There is no associated shortness of breath.  Patient was advised to take a deep breath which was observed by her son.  This did not elicit pain in her chest wall.  Will hold off on imaging studies since she is able to bear weight and ambulate with assistance.  She is uncomfortable at nighttime.  Will call in hydrocodone with acetaminophen 5 mg half tablet twice a day PRN only.  Cautioned about side effects including confusion, dizziness and constipation.  Son is aware of this along with the patient herself.  I am arranging for a follow-up phone visit again in a week  Total time discussing medical condition and plan 21 minutes  Other orders  -     HYDROcodone-acetaminophen (NORCO) 5-325 MG per tablet; Take 0.5 tablets by mouth 2 (Two) Times a Day As Needed for Severe Pain .

## 2020-06-15 ENCOUNTER — HOSPITAL ENCOUNTER (OUTPATIENT)
Dept: GENERAL RADIOLOGY | Facility: HOSPITAL | Age: 85
Discharge: HOME | End: 2020-06-15
Admitting: INTERNAL MEDICINE

## 2020-06-15 ENCOUNTER — OFFICE VISIT (OUTPATIENT)
Dept: INTERNAL MEDICINE | Facility: CLINIC | Age: 85
End: 2020-06-15

## 2020-06-15 VITALS
HEIGHT: 64 IN | HEART RATE: 76 BPM | WEIGHT: 149.8 LBS | SYSTOLIC BLOOD PRESSURE: 140 MMHG | OXYGEN SATURATION: 97 % | BODY MASS INDEX: 25.57 KG/M2 | TEMPERATURE: 98 F | DIASTOLIC BLOOD PRESSURE: 70 MMHG

## 2020-06-15 DIAGNOSIS — I10 ESSENTIAL HYPERTENSION: ICD-10-CM

## 2020-06-15 DIAGNOSIS — M81.8 OTHER OSTEOPOROSIS, UNSPECIFIED PATHOLOGICAL FRACTURE PRESENCE: ICD-10-CM

## 2020-06-15 DIAGNOSIS — G89.29 CHRONIC MIDLINE THORACIC BACK PAIN: ICD-10-CM

## 2020-06-15 DIAGNOSIS — M54.6 CHRONIC MIDLINE THORACIC BACK PAIN: ICD-10-CM

## 2020-06-15 DIAGNOSIS — E03.9 ACQUIRED HYPOTHYROIDISM: ICD-10-CM

## 2020-06-15 DIAGNOSIS — Z23 NEED FOR VACCINATION: Primary | ICD-10-CM

## 2020-06-15 DIAGNOSIS — S32.010A CLOSED COMPRESSION FRACTURE OF FIRST LUMBAR VERTEBRA, INITIAL ENCOUNTER: ICD-10-CM

## 2020-06-15 DIAGNOSIS — S32.020D CLOSED COMPRESSION FRACTURE OF L2 LUMBAR VERTEBRA WITH ROUTINE HEALING, SUBSEQUENT ENCOUNTER: ICD-10-CM

## 2020-06-15 PROCEDURE — 90732 PPSV23 VACC 2 YRS+ SUBQ/IM: CPT | Performed by: INTERNAL MEDICINE

## 2020-06-15 PROCEDURE — 99397 PER PM REEVAL EST PAT 65+ YR: CPT | Performed by: INTERNAL MEDICINE

## 2020-06-15 PROCEDURE — G0439 PPPS, SUBSEQ VISIT: HCPCS | Performed by: INTERNAL MEDICINE

## 2020-06-15 PROCEDURE — G0009 ADMIN PNEUMOCOCCAL VACCINE: HCPCS | Performed by: INTERNAL MEDICINE

## 2020-06-15 PROCEDURE — 72072 X-RAY EXAM THORAC SPINE 3VWS: CPT

## 2020-06-15 NOTE — PROGRESS NOTES
The ABCs of the Annual Wellness Visit  Subsequent Medicare Wellness Visit    Chief Complaint   Patient presents with   • Medicare Wellness-subsequent     mid back pain - seeing Dr. Cunningham        Subjective   History of Present Illness:  Noa Sutton is a 89 y.o. female who presents for a Subsequent Medicare Wellness Visit.    HEALTH RISK ASSESSMENT    Recent Hospitalizations:  No hospitalization(s) within the last year.    Current Medical Providers:  Patient Care Team:  Matt Blake MD as PCP - Carmen Jose PA-C as Referring Physician (Internal Medicine)    Smoking Status:  Social History     Tobacco Use   Smoking Status Never Smoker   Smokeless Tobacco Never Used       Alcohol Consumption:  Social History     Substance and Sexual Activity   Alcohol Use No       Depression Screen:   PHQ-2/PHQ-9 Depression Screening 6/15/2020   Little interest or pleasure in doing things 0   Feeling down, depressed, or hopeless 0   Trouble falling or staying asleep, or sleeping too much -   Feeling tired or having little energy -   Poor appetite or overeating -   Feeling bad about yourself - or that you are a failure or have let yourself or your family down -   Trouble concentrating on things, such as reading the newspaper or watching television -   Moving or speaking so slowly that other people could have noticed. Or the opposite - being so fidgety or restless that you have been moving around a lot more than usual -   Thoughts that you would be better off dead, or of hurting yourself in some way -   Total Score 0   If you checked off any problems, how difficult have these problems made it for you to do your work, take care of things at home, or get along with other people? -       Fall Risk Screen:  STEADI Fall Risk Assessment was completed, and patient is at MODERATE risk for falls. Assessment completed on:6/15/2020    Health Habits and Functional and Cognitive Screening:  Functional & Cognitive Status  6/15/2020   Do you have difficulty preparing food and eating? No   Do you have difficulty bathing yourself, getting dressed or grooming yourself? No   Do you have difficulty using the toilet? No   Do you have difficulty moving around from place to place? No   Do you have trouble with steps or getting out of a bed or a chair? Yes   Current Diet Well Balanced Diet   Dental Exam Up to date   Eye Exam Up to date   Exercise (times per week) 0 times per week   Current Exercise Activities Include No Regular Exercise   Do you need help using the phone?  No   Are you deaf or do you have serious difficulty hearing?  No   Do you need help with transportation? No   Do you need help shopping? No   Do you need help preparing meals?  No   Do you need help with housework?  No   Do you need help with laundry? No   Do you need help taking your medications? No   Do you need help managing money? No   Do you ever drive or ride in a car without wearing a seat belt? No   Have you felt unusual stress, anger or loneliness in the last month? No   Who do you live with? Alone   If you need help, do you have trouble finding someone available to you? No   Have you been bothered in the last four weeks by sexual problems? No   Do you have difficulty concentrating, remembering or making decisions? Yes         Does the patient have evidence of cognitive impairment? No    Asprin use counseling:Does not need ASA (and currently is not on it)    Age-appropriate Screening Schedule:  Refer to the list below for future screening recommendations based on patient's age, sex and/or medical conditions. Orders for these recommended tests are listed in the plan section. The patient has been provided with a written plan.    Health Maintenance   Topic Date Due   • DXA SCAN  06/15/2020   • TDAP/TD VACCINES (1 - Tdap) 09/09/2020 (Originally 10/7/1937)   • ZOSTER VACCINE (2 of 2) 09/09/2029 (Originally 6/18/2013)   • INFLUENZA VACCINE  08/01/2020          The  following portions of the patient's history were reviewed and updated as appropriate: allergies, current medications, past family history, past medical history, past social history, past surgical history and problem list.    Outpatient Medications Prior to Visit   Medication Sig Dispense Refill   • amLODIPine-benazepril (LOTREL) 10-20 MG per capsule TAKE 1 CAPSULE DAILY 90 capsule 4   • Cholecalciferol (VITAMIN D3) 1000 UNITS capsule Take 1 capsule by mouth daily.     • levothyroxine (SYNTHROID, LEVOTHROID) 100 MCG tablet TAKE 1 TABLET DAILY 90 tablet 4   • metoprolol tartrate (LOPRESSOR) 25 MG tablet TAKE 1 TABLET TWICE A  tablet 4   • omeprazole (priLOSEC) 20 MG capsule TAKE 1 CAPSULE DAILY 90 capsule 3   • HYDROcodone-acetaminophen (NORCO) 5-325 MG per tablet Take 0.5 tablets by mouth 2 (Two) Times a Day As Needed for Severe Pain . 10 tablet 0     No facility-administered medications prior to visit.        Patient Active Problem List   Diagnosis   • Essential hypertension   • Acquired hypothyroidism   • Vitamin D deficiency   • Closed compression fracture of L1 vertebra (CMS/HCC)   • Closed compression fracture of L2 vertebra (CMS/HCC)       Advanced Care Planning:  ACP discussion was held with the patient during this visit. Patient has an advance directive (not in EMR), copy requested.    Review of Systems   Constitutional: Positive for fatigue. Negative for activity change, appetite change and fever.   HENT: Negative for congestion, ear discharge, ear pain and trouble swallowing.    Eyes: Negative for photophobia and visual disturbance.   Respiratory: Negative for cough and shortness of breath.    Cardiovascular: Negative for chest pain and palpitations.   Gastrointestinal: Negative for abdominal distention, abdominal pain, constipation, diarrhea, nausea and vomiting.   Endocrine: Negative.    Genitourinary: Negative for dysuria, hematuria and urgency.   Musculoskeletal: Positive for arthralgias, back  "pain and gait problem. Negative for joint swelling and myalgias.   Skin: Negative for color change and rash.   Allergic/Immunologic: Negative.    Neurological: Negative for dizziness, weakness, light-headedness and headaches.   Hematological: Negative for adenopathy. Does not bruise/bleed easily.   Psychiatric/Behavioral: Positive for sleep disturbance. Negative for agitation, confusion and dysphoric mood. The patient is not nervous/anxious.        Compared to one year ago, the patient feels her physical health is worse.  Compared to one year ago, the patient feels her mental health is the same.    Reviewed chart for potential of high risk medication in the elderly: yes  Reviewed chart for potential of harmful drug interactions in the elderly:yes    Objective         Vitals:    06/15/20 1013   BP: 140/70   Pulse: 76   Temp: 98 °F (36.7 °C)   TempSrc: Temporal   SpO2: 97%   Weight: 67.9 kg (149 lb 12.8 oz)   Height: 162.6 cm (64.02\")   PainSc:   9   PainLoc: Back       Body mass index is 25.7 kg/m².  Discussed the patient's BMI with her. The BMI is in the acceptable range.    Physical Exam   Constitutional: She is oriented to person, place, and time. She appears well-developed and well-nourished. No distress.   HENT:   Nose: Nose normal.   Mouth/Throat: Oropharynx is clear and moist.   Eyes: Conjunctivae and EOM are normal. No scleral icterus.   Neck: No tracheal deviation present. No thyromegaly present.   Cardiovascular: Normal rate and regular rhythm. Exam reveals no friction rub.   No murmur heard.  Pulmonary/Chest: No respiratory distress. She has no wheezes. She has no rales.   Abdominal: Soft. She exhibits no distension and no mass. There is no tenderness. There is no guarding.   Musculoskeletal: Normal range of motion. She exhibits deformity.   kyphosis   Lymphadenopathy:     She has no cervical adenopathy.   Neurological: She is alert and oriented to person, place, and time. She has normal reflexes. No " cranial nerve deficit. Coordination normal.   Skin: Skin is warm and dry. No rash noted. No erythema.   Psychiatric: She has a normal mood and affect. Her behavior is normal. Judgment and thought content normal.             Assessment/Plan   Medicare Risks and Personalized Health Plan  CMS Preventative Services Quick Reference  Advance Directive Discussion  Chronic Pain   Fall Risk  Osteoprorosis Risk  Polypharmacy  Urinary Incontinence    The above risks/problems have been discussed with the patient.  Pertinent information has been shared with the patient in the After Visit Summary.  Follow up plans and orders are seen below in the Assessment/Plan Section.    Diagnoses and all orders for this visit:    1. Need for vaccination (Primary)  -     Pneumococcal Polysaccharide Vaccine 23-Valent Greater Than or Equal To 3yo Subcutaneous / IM    2. Acquired hypothyroidism clinically euthyroid recent TSH okay stable with current meds    3. Essential hypertension    4. Closed compression fracture of L2 lumbar vertebra with routine healing, subsequent encounter currently without low back pain.  Stable    5. Closed compression fracture of first lumbar vertebra, initial encounter (CMS/[facility]) stable    6. Other osteoporosis, unspecified pathological fracture presence has significant kyphosis with upper back pain now.  Rule out severe osteoporosis  -     DEXA Bone Density Axial    7. Chronic midline thoracic back pain check x-ray to rule out compression fracture  -     XR Spine Thoracic 3 View (In Office)      Follow Up:  No follow-ups on file.     An After Visit Summary and PPPS were given to the patient.

## 2020-06-18 ENCOUNTER — APPOINTMENT (OUTPATIENT)
Dept: BONE DENSITY | Facility: HOSPITAL | Age: 85
End: 2020-06-18

## 2020-06-18 PROCEDURE — 77080 DXA BONE DENSITY AXIAL: CPT

## 2020-06-24 ENCOUNTER — TELEPHONE (OUTPATIENT)
Dept: INTERNAL MEDICINE | Facility: CLINIC | Age: 85
End: 2020-06-24

## 2020-06-25 RX ORDER — ALENDRONATE SODIUM 70 MG/1
70 TABLET ORAL
Qty: 13 TABLET | Refills: 3 | Status: SHIPPED | OUTPATIENT
Start: 2020-06-25 | End: 2021-04-22

## 2020-08-03 ENCOUNTER — TREATMENT (OUTPATIENT)
Dept: PHYSICAL THERAPY | Facility: CLINIC | Age: 85
End: 2020-08-03

## 2020-08-03 DIAGNOSIS — M54.6 THORACIC SPINE PAIN: ICD-10-CM

## 2020-08-03 DIAGNOSIS — G89.29 CHRONIC BILATERAL LOW BACK PAIN WITHOUT SCIATICA: Primary | ICD-10-CM

## 2020-08-03 DIAGNOSIS — M54.50 CHRONIC BILATERAL LOW BACK PAIN WITHOUT SCIATICA: Primary | ICD-10-CM

## 2020-08-03 PROCEDURE — 97161 PT EVAL LOW COMPLEX 20 MIN: CPT | Performed by: PHYSICAL THERAPIST

## 2020-08-03 PROCEDURE — 97110 THERAPEUTIC EXERCISES: CPT | Performed by: PHYSICAL THERAPIST

## 2020-08-03 NOTE — PROGRESS NOTES
Physical Therapy Initial Evaluation and Plan of Care      Patient: Noa Sutton   : 10/7/1926  Diagnosis/ICD-10 Code:  No primary diagnosis found.  Referring practitioner: Matt Blake MD    Subjective Evaluation    History of Present Illness  Mechanism of injury: Back pain 7-8 years ago when she had some compression fxs.  She was working in the garden when they occurred.      Mery lazara this past year she felt back pain elevate.  Lower thoracic area was the primary area.  She now complains of pain throughout the spine and she needs more help with ambulation.     Pt only lays on her L side in the bed and mostly uses the recliner at home.       Patient Occupation: retired Pain  Current pain ratin  At worst pain ratin  Location: whole spine  Relieving factors: rest and support  Aggravating factors: movement, standing, ambulation and repetitive movement    Social Support  Lives with: alone    Treatments  Previous treatment: physical therapy  Patient Goals  Patient goals for therapy: decreased pain, improved balance, increased strength, independence with ADLs/IADLs and return to sport/leisure activities             Objective          Static Posture     Head  Forward.    Shoulders  Rounded.    Thoracic Spine  Hyperkyphosis.    Postural Observations  Seated posture: poor  Standing posture: poor  Correction of posture: makes symptoms better    Additional Postural Observation Details  Pt is very forward flexed and hyper kyphotic.  She does seem to have less pain when she stands more erectly and has support.     Palpation     Additional Palpation Details  General tenderness and tightness throughout the whole spine.      Active Range of Motion   Left Shoulder   Flexion: 70 (in standing) degrees     Right Shoulder   Flexion: 70 (in standing) degrees     Additional Active Range of Motion Details  Bilateral wand flexion in supine 135 degrees  ROM testing not formally performed due to decreased function,  posture and weakness.      Ambulation   Weight-Bearing Status   Assistive device used: single point cane    Additional Weight-Bearing Status Details  Cane is too short causing her to lean forward excessively.     Observational Gait   Decreased walking speed and stride length.      General Comments     Lumbar Comments  Correction of posture and starting generalized mobility and strengthening activity seems to reduce her pain.          Assessment & Plan     Assessment  Impairments: abnormal muscle tone, abnormal or restricted ROM, activity intolerance, lacks appropriate home exercise program and pain with function  Assessment details: Patient is a 89 year old female who comes to physical therapy with chronic back pain, posture and weakness issues. She is responding to change in mechanics for the cane and focus on mobility and strength. .  The patient currently has pain, decreased ROM, decreased strength, and inability to perform all essential functional activities. Pt will benefit from skilled PT services to address the above issues.     Prognosis: fair  Functional Limitations: carrying objects, lifting, pulling, pushing, uncomfortable because of pain, sitting, standing, stooping and unable to perform repetitive tasks  Goals  Plan Goals: SHORT TERM GOALS:     2 weeks  1. Pt independent with HEP  2. Pt to demonstrate trunk AROM 25-50% of expected norms to allow for improved ability to perform ADL's  3. Pt to demonstrate bilateral hip strength 3/5 in all planes to improved stability of the core/trunk     LONG TERM GOALS:   6 weeks  1. Pt to demonstrate trunk AROM 50-75% of expected norms to allow for improved ability to perform functional activities  2. Pt to demonstrate ability to perform sit to stand transfer without any assistance.   3. Pt to report being able to  work in the home without increase in pain in the back      Plan  Therapy options: will be seen for skilled physical therapy services  Planned modality  interventions: cryotherapy and thermotherapy (hydrocollator packs)  Planned therapy interventions: abdominal trunk stabilization, manual therapy, soft tissue mobilization, strengthening, stretching, therapeutic activities, functional ROM exercises, flexibility, body mechanics training, neuromuscular re-education, postural training, gait training, home exercise program and transfer training  Duration in visits: 8  Treatment plan discussed with: patient  Plan details: Pt will be seen 1-2 x / week.  Assess Pt response to PREP, posture and body mechanics.         Manual Therapy:         mins  63457;  Therapeutic Exercise:    16     mins  45920;     Neuromuscular Uyen:        mins  22762;    Therapeutic Activity:          mins  76222;     Gait Training:           mins  71142;     Ultrasound:          mins  54359;    Electrical Stimulation:         mins  10661 ( );  Dry Needling          mins self-pay    Timed Treatment:   16   mins   Total Treatment:     40   mins    PT SIGNATURE: Charly Aviles, PT   DATE TREATMENT INITIATED: 8/3/2020    Medicare Initial Certification  Certification Period: 11/1/2020  I certify that the therapy services are furnished while this patient is under my care.  The services outlined above are required by this patient, and will be reviewed every 90 days.     PHYSICIAN: Matt Blake MD      DATE:     Please sign and return via fax to  .. Thank you, [facility].

## 2020-08-10 ENCOUNTER — TREATMENT (OUTPATIENT)
Dept: PHYSICAL THERAPY | Facility: CLINIC | Age: 85
End: 2020-08-10

## 2020-08-10 DIAGNOSIS — M54.6 THORACIC SPINE PAIN: ICD-10-CM

## 2020-08-10 DIAGNOSIS — G89.29 CHRONIC BILATERAL LOW BACK PAIN WITHOUT SCIATICA: Primary | ICD-10-CM

## 2020-08-10 DIAGNOSIS — M54.50 CHRONIC BILATERAL LOW BACK PAIN WITHOUT SCIATICA: Primary | ICD-10-CM

## 2020-08-10 PROCEDURE — 97110 THERAPEUTIC EXERCISES: CPT | Performed by: PHYSICAL THERAPIST

## 2020-08-10 PROCEDURE — 97530 THERAPEUTIC ACTIVITIES: CPT | Performed by: PHYSICAL THERAPIST

## 2020-08-10 NOTE — PROGRESS NOTES
Physical Therapy Daily Progress Note    Patient: Noa Sutton   : 10/7/1926  Diagnosis/ICD-10 Code:  Chronic bilateral low back pain without sciatica [M54.5, G89.29]  Referring practitioner: Matt Blake MD  Date of Initial Visit: Type: THERAPY  Noted: 8/3/2020  Today's Date: 8/10/2020  Patient seen for 2 sessions           Subjective Noa reports no pain today at rest. She did have some soreness following her last session.     She does not report pain with transfers from sit to stand.    She says she is having a hard time with scapular retractions at home but her other exercises are going well.     Objective Pt with no distress noted at rest.     She is standing with a more upright posture.     AROM supine L shoulder flexion 148 degrees     See Exercise, Manual, and Modality Logs for complete treatment.     Assessment/Plan Pt has improved since her last visit. She is able to perform her exercise pain free.     Progress per Plan of Care Assess bridging addition to HEP.            Manual Therapy:         mins  15421;  Therapeutic Exercise:    31     mins  13117;     Neuromuscular Uyen:        mins  23072;    Therapeutic Activity:     9     mins  54634;     Gait Training:           mins  44622;     Ultrasound:          mins  18646;    Electrical Stimulation:         mins  70217 ( );  Dry Needling          mins self-pay    Timed Treatment:   40   mins   Total Treatment:     40   mins    Charly Aviles, PT  Physical Therapist

## 2020-08-17 ENCOUNTER — TREATMENT (OUTPATIENT)
Dept: PHYSICAL THERAPY | Facility: CLINIC | Age: 85
End: 2020-08-17

## 2020-08-17 DIAGNOSIS — M54.6 PAIN IN THORACIC SPINE: ICD-10-CM

## 2020-08-17 DIAGNOSIS — M54.50 CHRONIC MIDLINE LOW BACK PAIN WITHOUT SCIATICA: Primary | ICD-10-CM

## 2020-08-17 DIAGNOSIS — G89.29 CHRONIC MIDLINE LOW BACK PAIN WITHOUT SCIATICA: Primary | ICD-10-CM

## 2020-08-17 PROCEDURE — 97110 THERAPEUTIC EXERCISES: CPT | Performed by: PHYSICAL THERAPIST

## 2020-08-17 NOTE — PROGRESS NOTES
Physical Therapy Daily Progress Note    Patient: Noa Sutton   : 10/7/1926  Diagnosis/ICD-10 Code:  Chronic midline low back pain without sciatica [M54.5, G89.29]  Referring practitioner: Matt Blake MD  Date of Initial Visit: No linked episodes  Today's Date: 2020  Patient seen for Visit count could not be calculated. Make sure you are using a visit which is associated with an episode. sessions         Noa Sutton reports that her low back is better in terms of pain.  Has had mores soreness in her middle back in the past few days.  Feels like physical therapy is improving her low back pain and general mobility.  Subjective     Objective   See Exercise, Manual, and Modality Logs for complete treatment.   Seated BP (post table exercises):  130\70  Assessment/Plan  Focus of treatment today was to improve L\S and T\S mobility, and decrease soreness.  Adjusted some hip exercises to improve comfort by adding a pillow between the knees.       Manual Therapy:         mins  51207;  Therapeutic Exercise:    45     mins  29463;     Neuromuscular Uyen:        mins  52755;    Therapeutic Activity:          mins  60655;     Gait Training:           mins  29640;     Ultrasound:          mins  86230;    Electrical Stimulation:         mins  29645 ( );  Dry Needling          mins self-pay    Timed Treatment:   45   mins   Total Treatment:     50   mins    Ganesh Green PT  Physical Therapist

## 2020-08-21 RX ORDER — OMEPRAZOLE 20 MG/1
CAPSULE, DELAYED RELEASE ORAL
Qty: 90 CAPSULE | Refills: 3 | Status: SHIPPED | OUTPATIENT
Start: 2020-08-21 | End: 2021-08-17

## 2020-08-26 ENCOUNTER — TREATMENT (OUTPATIENT)
Dept: PHYSICAL THERAPY | Facility: CLINIC | Age: 85
End: 2020-08-26

## 2020-08-26 DIAGNOSIS — M54.50 CHRONIC MIDLINE LOW BACK PAIN WITHOUT SCIATICA: Primary | ICD-10-CM

## 2020-08-26 DIAGNOSIS — M54.6 PAIN IN THORACIC SPINE: ICD-10-CM

## 2020-08-26 DIAGNOSIS — G89.29 CHRONIC MIDLINE LOW BACK PAIN WITHOUT SCIATICA: Primary | ICD-10-CM

## 2020-08-26 PROCEDURE — 97110 THERAPEUTIC EXERCISES: CPT | Performed by: PHYSICAL THERAPIST

## 2020-08-26 NOTE — PROGRESS NOTES
Physical Therapy Daily Progress Note    Patient Information  Noa Sutton  10/7/1926      Visit # : 3    Noa Sutton reports she feels like she pulled a muscle.  Pt reports she was doing more over the past day and she has had more pain or it feels like she has no support.     Pt reports she has not been doing well with the exercises.  Pt reports most of her pain recently is in the mid back.      Objective Pt presents to PT today with no distress noted.       Pt still needs cues for HEP reproduction and body mechanics.       See Exercise, Manual, and Modality Logs for complete treatment.     Assessment/Plan  Pt is doing better but she doesn't realized she is doing better.  She is now saying her pain is in the mid T/S area instead of the upper low back.       Progress per Plan of Care and Progress strengthening /stabilization /functional activity      Visit Diagnoses:    ICD-10-CM ICD-9-CM   1. Chronic midline low back pain without sciatica M54.5 724.2    G89.29 338.29   2. Pain in thoracic spine M54.6 724.1            Manual Therapy:         mins  57616;  Therapeutic Exercise:    24     mins  67070;     Neuromuscular Uyen:        mins  96493;    Therapeutic Activity:          mins  49443;     Gait Training:        ___  mins  73769;     Ultrasound:          mins  52007;    Electrical Stimulation:         mins  20426 ( );  Dry Needling          mins self-pay  Fluido  Ionto     Timed Treatment:   24   mins   Total Treatment:     24   mins    Charly Aviles, PT  Physical Therapist

## 2020-09-02 ENCOUNTER — TREATMENT (OUTPATIENT)
Dept: PHYSICAL THERAPY | Facility: CLINIC | Age: 85
End: 2020-09-02

## 2020-09-02 DIAGNOSIS — G89.29 CHRONIC MIDLINE LOW BACK PAIN WITHOUT SCIATICA: Primary | ICD-10-CM

## 2020-09-02 DIAGNOSIS — M54.6 THORACIC SPINE PAIN: ICD-10-CM

## 2020-09-02 DIAGNOSIS — M54.50 CHRONIC MIDLINE LOW BACK PAIN WITHOUT SCIATICA: Primary | ICD-10-CM

## 2020-09-02 DIAGNOSIS — M54.50 CHRONIC BILATERAL LOW BACK PAIN WITHOUT SCIATICA: ICD-10-CM

## 2020-09-02 DIAGNOSIS — G89.29 CHRONIC BILATERAL LOW BACK PAIN WITHOUT SCIATICA: ICD-10-CM

## 2020-09-02 DIAGNOSIS — M54.6 PAIN IN THORACIC SPINE: ICD-10-CM

## 2020-09-02 PROCEDURE — 97530 THERAPEUTIC ACTIVITIES: CPT | Performed by: PHYSICAL THERAPIST

## 2020-09-02 PROCEDURE — 97110 THERAPEUTIC EXERCISES: CPT | Performed by: PHYSICAL THERAPIST

## 2020-09-02 NOTE — PROGRESS NOTES
Physical Therapy Daily Progress Note    Patient Information  Noa Sutton  10/7/1926      Visit # : 4    Noa Sutton reports 0/10 pain today at rest.  Pt reports that she is doing okay.  She is not having any severe pain just nagging.         Objective Pt presents to PT today with no distress.  Cane in use and she is able to stand much more erectly without pain.     Pt with good reproduction of HEP today with no cues needed from PT.      The posture correction with seated position is helpful and pain free.     Pt is Independent with HEP.     See Exercise, Manual, and Modality Logs for complete treatment.     Assessment/Plan  Pt with improved overall status.  She is pain free and independent with HEP.  The seated posture position seems to be helping reduce her pain. She has met all goals.  She is ready for D/C to HEP.       D/C to HEP.     Visit Diagnoses:    ICD-10-CM ICD-9-CM   1. Chronic midline low back pain without sciatica M54.5 724.2    G89.29 338.29   2. Pain in thoracic spine M54.6 724.1   3. Chronic bilateral low back pain without sciatica M54.5 724.2    G89.29 338.29   4. Thoracic spine pain M54.6 724.1            Manual Therapy:         mins  24750;  Therapeutic Exercise:    15     mins  84449;     Neuromuscular Uyen:        mins  16762;    Therapeutic Activity:     11     mins  84125;     Gait Training:        ___  mins  51662;     Ultrasound:          mins  35466;    Electrical Stimulation:         mins  23193 ( );  Dry Needling          mins self-pay  Fluido  Ionto     Timed Treatment:   26   mins   Total Treatment:     26   mins    Charly Aviles, PT  Physical Therapist

## 2020-11-24 RX ORDER — LEVOTHYROXINE SODIUM 0.1 MG/1
TABLET ORAL
Qty: 90 TABLET | Refills: 3 | Status: SHIPPED | OUTPATIENT
Start: 2020-11-24 | End: 2021-11-19

## 2021-02-22 RX ORDER — AMLODIPINE BESYLATE AND BENAZEPRIL HYDROCHLORIDE 10; 20 MG/1; MG/1
CAPSULE ORAL
Qty: 90 CAPSULE | Refills: 3 | Status: SHIPPED | OUTPATIENT
Start: 2021-02-22 | End: 2022-02-04

## 2021-04-22 RX ORDER — ALENDRONATE SODIUM 70 MG/1
TABLET ORAL
Qty: 12 TABLET | Refills: 3 | Status: SHIPPED | OUTPATIENT
Start: 2021-04-22 | End: 2022-03-24

## 2021-06-18 ENCOUNTER — OFFICE VISIT (OUTPATIENT)
Dept: INTERNAL MEDICINE | Facility: CLINIC | Age: 86
End: 2021-06-18

## 2021-06-18 VITALS
DIASTOLIC BLOOD PRESSURE: 70 MMHG | WEIGHT: 142.4 LBS | TEMPERATURE: 98 F | OXYGEN SATURATION: 95 % | HEIGHT: 64 IN | BODY MASS INDEX: 24.31 KG/M2 | HEART RATE: 75 BPM | SYSTOLIC BLOOD PRESSURE: 140 MMHG

## 2021-06-18 DIAGNOSIS — I10 ESSENTIAL HYPERTENSION: Primary | ICD-10-CM

## 2021-06-18 DIAGNOSIS — E55.9 VITAMIN D DEFICIENCY: ICD-10-CM

## 2021-06-18 DIAGNOSIS — E03.9 ACQUIRED HYPOTHYROIDISM: ICD-10-CM

## 2021-06-18 DIAGNOSIS — E53.8 B12 DEFICIENCY: ICD-10-CM

## 2021-06-18 PROCEDURE — G0439 PPPS, SUBSEQ VISIT: HCPCS | Performed by: INTERNAL MEDICINE

## 2021-06-18 PROCEDURE — 99397 PER PM REEVAL EST PAT 65+ YR: CPT | Performed by: INTERNAL MEDICINE

## 2021-06-18 PROCEDURE — 1170F FXNL STATUS ASSESSED: CPT | Performed by: INTERNAL MEDICINE

## 2021-06-18 PROCEDURE — 1125F AMNT PAIN NOTED PAIN PRSNT: CPT | Performed by: INTERNAL MEDICINE

## 2021-06-18 NOTE — PROGRESS NOTES
The ABCs of the Annual Wellness Visit  Subsequent Medicare Wellness Visit    Chief Complaint   Patient presents with   • Medicare Wellness-subsequent     low energy        Subjective   History of Present Illness:  Noa Sutton is a 89 y.o. female who presents for a Subsequent Medicare Wellness Visit.    HEALTH RISK ASSESSMENT    Recent Hospitalizations:  No hospitalization(s) within the last year.    Current Medical Providers:  Patient Care Team:  Matt Blake MD as PCP - General (Internal Medicine)  Carmen Neves PA-C as Referring Physician (Internal Medicine)    Smoking Status:  Social History     Tobacco Use   Smoking Status Never Smoker   Smokeless Tobacco Never Used       Alcohol Consumption:  Social History     Substance and Sexual Activity   Alcohol Use No       Depression Screen:   PHQ-2/PHQ-9 Depression Screening 6/18/2021   Little interest or pleasure in doing things 0   Feeling down, depressed, or hopeless 0   Trouble falling or staying asleep, or sleeping too much -   Feeling tired or having little energy -   Poor appetite or overeating -   Feeling bad about yourself - or that you are a failure or have let yourself or your family down -   Trouble concentrating on things, such as reading the newspaper or watching television -   Moving or speaking so slowly that other people could have noticed. Or the opposite - being so fidgety or restless that you have been moving around a lot more than usual -   Thoughts that you would be better off dead, or of hurting yourself in some way -   Total Score 0   If you checked off any problems, how difficult have these problems made it for you to do your work, take care of things at home, or get along with other people? -       Fall Risk Screen:  STEADI Fall Risk Assessment has not been completed.    Health Habits and Functional and Cognitive Screening:  Functional & Cognitive Status 6/18/2021   Do you have difficulty preparing food and eating? No   Do  you have difficulty bathing yourself, getting dressed or grooming yourself? No   Do you have difficulty using the toilet? No   Do you have difficulty moving around from place to place? No   Do you have trouble with steps or getting out of a bed or a chair? Yes   Current Diet Well Balanced Diet        Current Diet Comment -   Dental Exam Up to date        Dental Exam Comment -   Eye Exam Up to date        Eye Exam Comment -   Exercise (times per week) 7 times per week   Current Exercises Include (No Data)        Exercise Comment PT type exercises    Current Exercise Activities Include -   Do you need help using the phone?  No   Are you deaf or do you have serious difficulty hearing?  Yes   Do you need help with transportation? No   Do you need help shopping? No   Do you need help preparing meals?  No   Do you need help with housework?  No   Do you need help with laundry? No   Do you need help taking your medications? No   Do you need help managing money? No   Do you ever drive or ride in a car without wearing a seat belt? No   Have you felt unusual stress, anger or loneliness in the last month? No   Who do you live with? Alone   If you need help, do you have trouble finding someone available to you? No   Have you been bothered in the last four weeks by sexual problems? No   Do you have difficulty concentrating, remembering or making decisions? Yes         Does the patient have evidence of cognitive impairment? No    Asprin use counseling:Does not need ASA (and currently is not on it)    Age-appropriate Screening Schedule:  Refer to the list below for future screening recommendations based on patient's age, sex and/or medical conditions. Orders for these recommended tests are listed in the plan section. The patient has been provided with a written plan.    Health Maintenance   Topic Date Due   • TDAP/TD VACCINES (1 - Tdap) Never done   • ZOSTER VACCINE (2 of 2) 09/09/2029 (Originally 6/18/2013)   • INFLUENZA VACCINE   08/01/2021   • DXA SCAN  06/18/2022          The following portions of the patient's history were reviewed and updated as appropriate: allergies, current medications, past family history, past medical history, past social history, past surgical history and problem list.    Outpatient Medications Prior to Visit   Medication Sig Dispense Refill   • alendronate (FOSAMAX) 70 MG tablet TAKE 1 TABLET EVERY 7 DAYS 12 tablet 3   • amLODIPine-benazepril (LOTREL) 10-20 MG per capsule TAKE 1 CAPSULE DAILY 90 capsule 3   • Cholecalciferol (VITAMIN D3) 1000 UNITS capsule Take 1 capsule by mouth Daily. Caltrate 600+D     • levothyroxine (SYNTHROID, LEVOTHROID) 100 MCG tablet TAKE 1 TABLET DAILY 90 tablet 3   • metoprolol tartrate (LOPRESSOR) 25 MG tablet TAKE 1 TABLET TWICE A  tablet 3   • omeprazole (priLOSEC) 20 MG capsule TAKE 1 CAPSULE DAILY 90 capsule 3   • Polyethyl Glycol-Propyl Glycol (SYSTANE OP) Apply  to eye(s) as directed by provider.       No facility-administered medications prior to visit.       Patient Active Problem List   Diagnosis   • Essential hypertension   • Acquired hypothyroidism   • Vitamin D deficiency   • Closed compression fracture of L1 vertebra (CMS/HCC)   • Closed compression fracture of L2 vertebra (CMS/HCC)       Advanced Care Planning:  ACP discussion was held with the patient during this visit. Patient has an advance directive (not in EMR), copy requested.    Review of Systems   Constitutional: Positive for fatigue. Negative for activity change, appetite change and fever.   HENT: Negative for congestion, ear discharge, ear pain and trouble swallowing.    Eyes: Negative for photophobia and visual disturbance.   Respiratory: Negative for cough and shortness of breath.    Cardiovascular: Negative for chest pain and palpitations.   Gastrointestinal: Negative for abdominal distention, abdominal pain, constipation, diarrhea, nausea and vomiting.   Endocrine: Negative.    Genitourinary: Negative  "for dysuria, hematuria and urgency.   Musculoskeletal: Positive for arthralgias and back pain. Negative for joint swelling and myalgias.   Skin: Negative for color change and rash.   Allergic/Immunologic: Negative.    Neurological: Negative for dizziness, weakness, light-headedness and headaches.   Hematological: Negative for adenopathy. Does not bruise/bleed easily.   Psychiatric/Behavioral: Positive for sleep disturbance. Negative for agitation, confusion and dysphoric mood. The patient is not nervous/anxious.        Compared to one year ago, the patient feels her physical health is worse.  Compared to one year ago, the patient feels her mental health is the same.    Reviewed chart for potential of high risk medication in the elderly: yes  Reviewed chart for potential of harmful drug interactions in the elderly:yes    Objective         Vitals:    06/18/21 1111   BP: 140/70   Pulse: 75   Temp: 98 °F (36.7 °C)   TempSrc: Infrared   SpO2: 95%   Weight: 64.6 kg (142 lb 6.4 oz)   Height: 162.6 cm (64.02\")   PainSc:   4   PainLoc: Back       Body mass index is 24.43 kg/m².  Discussed the patient's BMI with her. The BMI is in the acceptable range.    Physical Exam  Constitutional:       General: She is not in acute distress.     Appearance: She is well-developed.   HENT:      Nose: Nose normal.   Eyes:      General: No scleral icterus.     Conjunctiva/sclera: Conjunctivae normal.   Neck:      Thyroid: No thyromegaly.      Trachea: No tracheal deviation.   Cardiovascular:      Rate and Rhythm: Normal rate and regular rhythm.      Heart sounds: No murmur heard.   No friction rub.   Pulmonary:      Effort: No respiratory distress.      Breath sounds: No wheezing or rales.   Abdominal:      General: There is no distension.      Palpations: Abdomen is soft. There is no mass.      Tenderness: There is no abdominal tenderness. There is no guarding.   Musculoskeletal:         General: Deformity present. Normal range of motion. "   Lymphadenopathy:      Cervical: No cervical adenopathy.   Skin:     General: Skin is warm and dry.      Findings: No erythema or rash.   Neurological:      Mental Status: She is alert and oriented to person, place, and time.      Cranial Nerves: No cranial nerve deficit.      Coordination: Coordination normal.      Deep Tendon Reflexes: Reflexes are normal and symmetric.   Psychiatric:         Behavior: Behavior normal.         Thought Content: Thought content normal.         Judgment: Judgment normal.               Assessment/Plan   Medicare Risks and Personalized Health Plan  CMS Preventative Services Quick Reference  Advance Directive Discussion  Fall Risk  Osteoporosis Risk  Urinary Incontinence    The above risks/problems have been discussed with the patient.  Pertinent information has been shared with the patient in the After Visit Summary.  Follow up plans and orders are seen below in the Assessment/Plan Section.    Diagnoses and all orders for this visit:    1. Essential hypertension (Primary) stable with current meds and low-salt diet    2. Acquired hypothyroidism clinically euthyroid follow TSH    3. Vitamin D deficiency stable.  Follow vitamin D level      Follow Up:  No follow-ups on file.     An After Visit Summary and PPPS were given to the patient.

## 2021-06-19 LAB
25(OH)D3+25(OH)D2 SERPL-MCNC: 43.5 NG/ML (ref 30–100)
ALBUMIN SERPL-MCNC: 4.5 G/DL (ref 3.5–5.2)
ALBUMIN/GLOB SERPL: 1.9 G/DL
ALP SERPL-CCNC: 77 U/L (ref 39–117)
ALT SERPL-CCNC: 12 U/L (ref 1–33)
AST SERPL-CCNC: 20 U/L (ref 1–32)
BASOPHILS # BLD AUTO: 0.04 10*3/MM3 (ref 0–0.2)
BASOPHILS NFR BLD AUTO: 0.6 % (ref 0–1.5)
BILIRUB SERPL-MCNC: 0.5 MG/DL (ref 0–1.2)
BUN SERPL-MCNC: 16 MG/DL (ref 8–23)
BUN/CREAT SERPL: 21.1 (ref 7–25)
CALCIUM SERPL-MCNC: 9.5 MG/DL (ref 8.2–9.6)
CHLORIDE SERPL-SCNC: 98 MMOL/L (ref 98–107)
CO2 SERPL-SCNC: 26.9 MMOL/L (ref 22–29)
CREAT SERPL-MCNC: 0.76 MG/DL (ref 0.57–1)
EOSINOPHIL # BLD AUTO: 0.07 10*3/MM3 (ref 0–0.4)
EOSINOPHIL NFR BLD AUTO: 1 % (ref 0.3–6.2)
ERYTHROCYTE [DISTWIDTH] IN BLOOD BY AUTOMATED COUNT: 13.2 % (ref 12.3–15.4)
GLOBULIN SER CALC-MCNC: 2.4 GM/DL
GLUCOSE SERPL-MCNC: 106 MG/DL (ref 65–99)
HCT VFR BLD AUTO: 41.7 % (ref 34–46.6)
HGB BLD-MCNC: 13.9 G/DL (ref 12–15.9)
IMM GRANULOCYTES # BLD AUTO: 0.02 10*3/MM3 (ref 0–0.05)
IMM GRANULOCYTES NFR BLD AUTO: 0.3 % (ref 0–0.5)
LYMPHOCYTES # BLD AUTO: 1.94 10*3/MM3 (ref 0.7–3.1)
LYMPHOCYTES NFR BLD AUTO: 27 % (ref 19.6–45.3)
MCH RBC QN AUTO: 30.3 PG (ref 26.6–33)
MCHC RBC AUTO-ENTMCNC: 33.3 G/DL (ref 31.5–35.7)
MCV RBC AUTO: 90.8 FL (ref 79–97)
MONOCYTES # BLD AUTO: 0.48 10*3/MM3 (ref 0.1–0.9)
MONOCYTES NFR BLD AUTO: 6.7 % (ref 5–12)
NEUTROPHILS # BLD AUTO: 4.64 10*3/MM3 (ref 1.7–7)
NEUTROPHILS NFR BLD AUTO: 64.4 % (ref 42.7–76)
NRBC BLD AUTO-RTO: 0 /100 WBC (ref 0–0.2)
PLATELET # BLD AUTO: 279 10*3/MM3 (ref 140–450)
POTASSIUM SERPL-SCNC: 5 MMOL/L (ref 3.5–5.2)
PROT SERPL-MCNC: 6.9 G/DL (ref 6–8.5)
RBC # BLD AUTO: 4.59 10*6/MM3 (ref 3.77–5.28)
SODIUM SERPL-SCNC: 136 MMOL/L (ref 136–145)
TSH SERPL DL<=0.005 MIU/L-ACNC: 2.75 UIU/ML (ref 0.27–4.2)
VIT B12 SERPL-MCNC: 428 PG/ML (ref 211–946)
WBC # BLD AUTO: 7.19 10*3/MM3 (ref 3.4–10.8)

## 2021-08-17 DIAGNOSIS — K21.9 GASTROESOPHAGEAL REFLUX DISEASE, UNSPECIFIED WHETHER ESOPHAGITIS PRESENT: Primary | ICD-10-CM

## 2021-08-17 RX ORDER — OMEPRAZOLE 20 MG/1
CAPSULE, DELAYED RELEASE ORAL
Qty: 90 CAPSULE | Refills: 3 | Status: ON HOLD | OUTPATIENT
Start: 2021-08-17 | End: 2022-08-11

## 2021-08-17 NOTE — TELEPHONE ENCOUNTER
Rx Refill Note  Requested Prescriptions     Pending Prescriptions Disp Refills   • omeprazole (priLOSEC) 20 MG capsule [Pharmacy Med Name: OMEPRAZOLE DR CAPS 20MG] 90 capsule 3     Sig: TAKE 1 CAPSULE DAILY      Last office visit with prescribing clinician: 6/18/2021      Next office visit with prescribing clinician: 12/20/2021            TONIO PIKE MA  08/17/21, 08:44 EDT

## 2021-11-19 DIAGNOSIS — E03.9 ACQUIRED HYPOTHYROIDISM: Primary | ICD-10-CM

## 2021-11-19 RX ORDER — LEVOTHYROXINE SODIUM 0.1 MG/1
TABLET ORAL
Qty: 90 TABLET | Refills: 3 | Status: SHIPPED | OUTPATIENT
Start: 2021-11-19 | End: 2022-07-20 | Stop reason: SDUPTHER

## 2021-11-19 NOTE — TELEPHONE ENCOUNTER
Rx Refill Note  Requested Prescriptions     Pending Prescriptions Disp Refills   • levothyroxine (SYNTHROID, LEVOTHROID) 100 MCG tablet [Pharmacy Med Name: L-THYROXINE TABS 100MCG] 90 tablet 3     Sig: TAKE 1 TABLET DAILY      Last office visit with prescribing clinician: 6/18/2021      Next office visit with prescribing clinician: 12/20/2021            TONIO PIKE MA  11/19/21, 14:53 EST

## 2021-12-10 ENCOUNTER — OFFICE VISIT (OUTPATIENT)
Dept: INTERNAL MEDICINE | Facility: CLINIC | Age: 86
End: 2021-12-10

## 2021-12-10 VITALS
TEMPERATURE: 98.6 F | DIASTOLIC BLOOD PRESSURE: 62 MMHG | HEIGHT: 64 IN | WEIGHT: 146 LBS | HEART RATE: 83 BPM | SYSTOLIC BLOOD PRESSURE: 120 MMHG | OXYGEN SATURATION: 95 % | BODY MASS INDEX: 24.92 KG/M2

## 2021-12-10 DIAGNOSIS — G89.29 CHRONIC MIDLINE LOW BACK PAIN WITHOUT SCIATICA: ICD-10-CM

## 2021-12-10 DIAGNOSIS — M54.50 CHRONIC MIDLINE LOW BACK PAIN WITHOUT SCIATICA: ICD-10-CM

## 2021-12-10 DIAGNOSIS — Z23 NEED FOR VACCINATION: Primary | ICD-10-CM

## 2021-12-10 DIAGNOSIS — E03.9 ACQUIRED HYPOTHYROIDISM: ICD-10-CM

## 2021-12-10 DIAGNOSIS — I10 ESSENTIAL HYPERTENSION: ICD-10-CM

## 2021-12-10 PROCEDURE — 0003A COVID-19 (PFIZER): CPT | Performed by: INTERNAL MEDICINE

## 2021-12-10 PROCEDURE — G0008 ADMIN INFLUENZA VIRUS VAC: HCPCS | Performed by: INTERNAL MEDICINE

## 2021-12-10 PROCEDURE — 99214 OFFICE O/P EST MOD 30 MIN: CPT | Performed by: INTERNAL MEDICINE

## 2021-12-10 PROCEDURE — 91300 COVID-19 (PFIZER): CPT | Performed by: INTERNAL MEDICINE

## 2021-12-10 PROCEDURE — 90662 IIV NO PRSV INCREASED AG IM: CPT | Performed by: INTERNAL MEDICINE

## 2021-12-10 NOTE — PROGRESS NOTES
"Subjective  Noa Sutton is a 89 y.o. female    HPI coming in for follow-up she is accompanied by her son who is giving us some of the history longstanding history of hypertension chronic low back pain history of compression fractures in her lumbosacral spine in the past has residual discomfort but no radiation of pain down her legs history of hypothyroidism.  Lives by herself no alcohol or tobacco use    The following portions of the patient's history were reviewed and updated as appropriate: allergies, current medications, past family history, past medical history, past social history, past surgical history, and problem list.     Review of Systems   Constitutional: Positive for fatigue. Negative for activity change, appetite change and fever.   HENT: Negative for congestion, ear discharge, ear pain and trouble swallowing.    Eyes: Negative for photophobia and visual disturbance.   Respiratory: Negative for cough and shortness of breath.    Cardiovascular: Negative for chest pain and palpitations.   Gastrointestinal: Negative for abdominal distention, abdominal pain, constipation, diarrhea, nausea and vomiting.   Endocrine: Negative.    Genitourinary: Negative for dysuria, hematuria and urgency.   Musculoskeletal: Positive for arthralgias, back pain and gait problem. Negative for joint swelling and myalgias.   Skin: Negative for color change and rash.   Allergic/Immunologic: Negative.    Neurological: Negative for dizziness, weakness, light-headedness and headaches.   Hematological: Negative for adenopathy. Does not bruise/bleed easily.   Psychiatric/Behavioral: Positive for sleep disturbance. Negative for agitation, confusion and dysphoric mood. The patient is not nervous/anxious.        Visit Vitals  /62   Pulse 83   Temp 98.6 °F (37 °C) (Infrared)   Ht 162.6 cm (64.02\")   Wt 66.2 kg (146 lb)   SpO2 95%   BMI 25.05 kg/m²       Objective  Physical Exam  Constitutional:       General: She is not in " acute distress.     Appearance: She is well-developed.   HENT:      Nose: Nose normal.   Eyes:      General: No scleral icterus.     Conjunctiva/sclera: Conjunctivae normal.   Neck:      Thyroid: No thyromegaly.      Trachea: No tracheal deviation.   Cardiovascular:      Rate and Rhythm: Normal rate and regular rhythm.      Heart sounds: No murmur heard.  No friction rub.   Pulmonary:      Effort: No respiratory distress.      Breath sounds: No wheezing or rales.   Abdominal:      General: There is no distension.      Palpations: Abdomen is soft. There is no mass.      Tenderness: There is no abdominal tenderness. There is no guarding.   Musculoskeletal:         General: Deformity present. Normal range of motion.   Lymphadenopathy:      Cervical: No cervical adenopathy.   Skin:     General: Skin is warm and dry.      Findings: No erythema or rash.   Neurological:      Mental Status: She is alert and oriented to person, place, and time.      Cranial Nerves: No cranial nerve deficit.      Coordination: Coordination normal.      Deep Tendon Reflexes: Reflexes are normal and symmetric.   Psychiatric:         Behavior: Behavior normal.         Thought Content: Thought content normal.         Judgment: Judgment normal.         Diagnoses and all orders for this visit:    Need for vaccination  -     Fluzone High-Dose 65+yrs ([id number])    Essential hypertension stable with current meds and low-salt diet    Acquired hypothyroidism clinically euthyroid follow TSH    Chronic midline low back pain without sciatica stable Tylenol as needed continue with home exercises and walking regimen.  On Fosamax for osteoporosis

## 2022-01-01 ENCOUNTER — TELEPHONE (OUTPATIENT)
Dept: INTERNAL MEDICINE | Facility: CLINIC | Age: 87
End: 2022-01-01

## 2022-01-26 ENCOUNTER — OFFICE VISIT (OUTPATIENT)
Dept: INTERNAL MEDICINE | Facility: CLINIC | Age: 87
End: 2022-01-26

## 2022-01-26 VITALS
TEMPERATURE: 97.2 F | OXYGEN SATURATION: 97 % | RESPIRATION RATE: 15 BRPM | HEIGHT: 64 IN | BODY MASS INDEX: 25.44 KG/M2 | DIASTOLIC BLOOD PRESSURE: 56 MMHG | SYSTOLIC BLOOD PRESSURE: 150 MMHG | WEIGHT: 149 LBS | HEART RATE: 85 BPM

## 2022-01-26 DIAGNOSIS — L03.119 CELLULITIS OF LOWER EXTREMITY, UNSPECIFIED LATERALITY: ICD-10-CM

## 2022-01-26 DIAGNOSIS — R30.0 DYSURIA: Primary | ICD-10-CM

## 2022-01-26 DIAGNOSIS — I10 ESSENTIAL HYPERTENSION: ICD-10-CM

## 2022-01-26 DIAGNOSIS — I87.2 VENOUS STASIS DERMATITIS OF BOTH LOWER EXTREMITIES: ICD-10-CM

## 2022-01-26 PROBLEM — H35.372 EPIRETINAL MEMBRANE (ERM) OF LEFT EYE: Status: ACTIVE | Noted: 2021-09-14

## 2022-01-26 PROBLEM — H35.3221 EXUDATIVE AGE-RELATED MACULAR DEGENERATION OF LEFT EYE WITH ACTIVE CHOROIDAL NEOVASCULARIZATION (HCC): Status: ACTIVE | Noted: 2021-09-14

## 2022-01-26 PROBLEM — K59.00 CONSTIPATION: Status: ACTIVE | Noted: 2022-01-26

## 2022-01-26 PROBLEM — E03.9 HYPOTHYROIDISM: Status: ACTIVE | Noted: 2022-01-26

## 2022-01-26 PROBLEM — H18.459 SALZMANN'S NODULAR DEGENERATION: Status: ACTIVE | Noted: 2021-09-14

## 2022-01-26 PROBLEM — H26.493 AFTER CATARACT OF BOTH EYES NOT OBSCURING VISION: Status: ACTIVE | Noted: 2021-09-14

## 2022-01-26 LAB
BILIRUB BLD-MCNC: ABNORMAL MG/DL
CLARITY, POC: ABNORMAL
COLOR UR: YELLOW
EXPIRATION DATE: ABNORMAL
GLUCOSE UR STRIP-MCNC: ABNORMAL MG/DL
KETONES UR QL: ABNORMAL
LEUKOCYTE EST, POC: ABNORMAL
Lab: ABNORMAL
NITRITE UR-MCNC: POSITIVE MG/ML
PH UR: 5.5 [PH] (ref 5–8)
PROT UR STRIP-MCNC: ABNORMAL MG/DL
RBC # UR STRIP: ABNORMAL /UL
SP GR UR: 1.02 (ref 1–1.03)
UROBILINOGEN UR QL: NORMAL

## 2022-01-26 PROCEDURE — 99214 OFFICE O/P EST MOD 30 MIN: CPT | Performed by: NURSE PRACTITIONER

## 2022-01-26 PROCEDURE — 81003 URINALYSIS AUTO W/O SCOPE: CPT | Performed by: NURSE PRACTITIONER

## 2022-01-26 RX ORDER — CEPHALEXIN 500 MG/1
500 CAPSULE ORAL 2 TIMES DAILY
Qty: 14 CAPSULE | Refills: 0 | Status: SHIPPED | OUTPATIENT
Start: 2022-01-26 | End: 2022-02-02

## 2022-01-26 NOTE — PROGRESS NOTES
Chief Complaint / Reason:      Chief Complaint   Patient presents with   • Edema     my legs have swelling and rough most of the winter. its in the calves but left is worse. my left hip is pain. fell about a month ago (hit butt on the floor and set down)       Subjective     HPI  Patient presents today with complaints of edema in bilateral lower extremities and states that they have been going on for about a week but have gotten worse and she had previously fell about a month ago and hit but on floor.  Denies any loss of consciousness.  She denies any fever chills.  She is accompanied by her son who states that she lives alone but there is family that helps take care of her.  She denies chest pain, shortness of breath or heart palpitations.  She states that she has had decreased appetite and nothing seems to taste good.  Patient's blood pressure is elevated and diastolic blood pressure is lower.  The swelling in the legs the left is worse than the right.  She denies any history of blood clots.  She states she has been taking her medication as prescribed.  States that she feels more weak than normal.  But evaluate and legs it was noted that she does have weeping due to the edema.  History taken from: patient    PMH/FH/Social History were reviewed and updated appropriately in the electronic medical record.   Past Medical History:   Diagnosis Date   • Constipation    • Disease of thyroid gland    • Hypertension      Past Surgical History:   Procedure Laterality Date   • CHOLECYSTECTOMY     • THYROID SURGERY       Social History     Socioeconomic History   • Marital status:    Tobacco Use   • Smoking status: Never Smoker   • Smokeless tobacco: Never Used   Substance and Sexual Activity   • Alcohol use: No     Family History   Problem Relation Age of Onset   • Arthritis Mother    • Stroke Mother    • Heart attack Other    • Cancer Other        Review of Systems:   Review of Systems   Constitutional: Positive for  activity change, appetite change and fatigue.   HENT: Negative.    Respiratory: Negative.    Cardiovascular: Negative.    Genitourinary: Positive for dysuria.   Musculoskeletal: Positive for arthralgias, gait problem and myalgias.   Skin: Positive for color change, dry skin and rash.   Allergic/Immunologic: Positive for environmental allergies.   Neurological: Positive for weakness.   Psychiatric/Behavioral: Positive for sleep disturbance.         All other systems were reviewed and are negative.  Exceptions are noted in the subjective or above.      Objective     Vital Signs  Vitals:    01/26/22 1653   BP: 150/56   Pulse: 85   Resp: 15   Temp: 97.2 °F (36.2 °C)   SpO2: 97%       Body mass index is 25.58 kg/m².    Physical Exam  Vitals and nursing note reviewed.   Constitutional:       General: She is not in acute distress.     Appearance: She is well-developed.   Cardiovascular:      Rate and Rhythm: Normal rate and regular rhythm.      Pulses: Normal pulses.      Heart sounds: Murmur heard.        Comments: Bilateral lower extremity edema pitting and slightly weeping.  Culture obtained  Pulmonary:      Effort: Pulmonary effort is normal.      Breath sounds: Normal breath sounds. No wheezing.   Chest:      Chest wall: No tenderness.   Musculoskeletal:         General: Tenderness and deformity present.      Right lower leg: Pitting Edema present.      Left lower leg: Pitting Edema present.      Comments: Uses walker for mobility   Skin:     General: Skin is warm and dry.      Capillary Refill: Capillary refill takes less than 2 seconds.      Coloration: Skin is not pale.      Findings: No erythema or rash.   Neurological:      Mental Status: She is alert and oriented to person, place, and time.   Psychiatric:         Behavior: Behavior normal.         Thought Content: Thought content normal.         Judgment: Judgment normal.              Results Review:    I reviewed the patient's new clinical results.   Office  Visit on 01/26/2022   Component Date Value Ref Range Status   • Color 01/26/2022 Yellow  Yellow, Straw, Dark Yellow, [place] Final   • Clarity, UA 01/26/2022 Cloudy* Clear Final   • Specific Gravity  01/26/2022 1.025  1.005 - 1.030 Final   • pH, Urine 01/26/2022 5.5  5.0 - 8.0 Final   • Leukocytes 01/26/2022 Small (1+)* Negative Final   • Nitrite, UA 01/26/2022 Positive* Negative Final   • Protein, POC 01/26/2022 1+* Negative mg/dL Final   • Glucose, UA 01/26/2022 1+* Negative, 1000 mg/dL (3+) mg/dL Final   • Ketones, UA 01/26/2022 1+* Negative Final   • Urobilinogen, UA 01/26/2022 Normal  Normal Final   • Bilirubin 01/26/2022 1 mg/dL* Negative Final   • Blood, UA 01/26/2022 1+* Negative Final   • Lot Number 01/26/2022 [id number]   Final   • Expiration Date 01/26/2022 07/25/2023   Final         Medication Review:     Current Outpatient Medications:   •  alendronate (FOSAMAX) 70 MG tablet, TAKE 1 TABLET EVERY 7 DAYS, Disp: 12 tablet, Rfl: 3  •  amLODIPine-benazepril (LOTREL) 10-20 MG per capsule, TAKE 1 CAPSULE DAILY, Disp: 90 capsule, Rfl: 3  •  Cholecalciferol (VITAMIN D3) 1000 UNITS capsule, Take 1 capsule by mouth Daily. Caltrate 600+D, Disp: , Rfl:   •  levothyroxine (SYNTHROID, LEVOTHROID) 100 MCG tablet, TAKE 1 TABLET DAILY, Disp: 90 tablet, Rfl: 3  •  metoprolol tartrate (LOPRESSOR) 25 MG tablet, TAKE 1 TABLET TWICE A DAY, Disp: 180 tablet, Rfl: 3  •  omeprazole (priLOSEC) 20 MG capsule, TAKE 1 CAPSULE DAILY, Disp: 90 capsule, Rfl: 3  •  Polyethyl Glycol-Propyl Glycol (SYSTANE OP), Apply  to eye(s) as directed by provider., Disp: , Rfl:   •  cephalexin (Keflex) 500 MG capsule, Take 1 capsule by mouth 2 (Two) Times a Day for 7 days., Disp: 14 capsule, Rfl: 0    Diagnoses and all orders for this visit:    Dysuria  -     POCT urinalysis dipstick, automated  -     Urine Culture - Urine, Urine, Clean Catch  Recommend maintaining hydration nutrition discussed results with patient's son.  Discussed UTI  prevention  Essential hypertension   Initiate lifestyle modifications.   DASH Diet and exercise.   Compliance with medication regimen and discussed ways to prevent of long-term complications from high blood pressure.  Discussed when to seek medical attention.  Encouraged patient to take blood pressure daily and keep a log.      venous stasis dermatitis of both lower extremities  Recommend improving blood pressure and closely monitoring at home.  Recommend elevating lower extremities to help improve venous return  Cellulitis of lower extremity, unspecified laterality  -     cephalexin (Keflex) 500 MG capsule; Take 1 capsule by mouth 2 (Two) Times a Day for 7 days.  -     Culture, Routine - Swab, Leg    Discussed home care instructions with patient and son    Advised follow-up in 1 week if no improvement.  Discussed worsening signs and symptoms  Return in 1 week (on 2/2/2022), or if symptoms worsen or fail to improve.    Elizabeth Scott, APRN  01/26/2022

## 2022-01-30 LAB
BACTERIA SPEC CULT: NORMAL
MICROORGANISM/AGENT SPEC: NORMAL

## 2022-01-31 LAB
BACTERIA UR CULT: ABNORMAL
BACTERIA UR CULT: ABNORMAL
OTHER ANTIBIOTIC SUSC ISLT: ABNORMAL

## 2022-02-01 ENCOUNTER — TELEPHONE (OUTPATIENT)
Dept: INTERNAL MEDICINE | Facility: CLINIC | Age: 87
End: 2022-02-01

## 2022-02-01 NOTE — TELEPHONE ENCOUNTER
Hub staff attempted to follow warm transfer process and was unsuccessful     Caller: Isaias Sutton    Relationship to patient: Emergency Contact    Best call back number: [phone]     Patient is needing:PATIENT'S SON CALLED TO SPEAK WITH DR RICH AND TONIO ESTES.  HE STATED THAT THEY CALLED THE PATIENT LAST NIGHT AND SHE DIDN'T UNDERSTAND EVERYTHING THAT SHE WAS TOLD.

## 2022-02-04 ENCOUNTER — HOME HEALTH ADMISSION (OUTPATIENT)
Dept: HOME HEALTH SERVICES | Facility: HOME HEALTHCARE | Age: 87
End: 2022-02-04

## 2022-02-04 ENCOUNTER — OFFICE VISIT (OUTPATIENT)
Dept: INTERNAL MEDICINE | Facility: CLINIC | Age: 87
End: 2022-02-04

## 2022-02-04 VITALS
DIASTOLIC BLOOD PRESSURE: 58 MMHG | SYSTOLIC BLOOD PRESSURE: 120 MMHG | HEIGHT: 64 IN | BODY MASS INDEX: 24.24 KG/M2 | HEART RATE: 68 BPM | TEMPERATURE: 98.2 F | OXYGEN SATURATION: 96 % | WEIGHT: 142 LBS

## 2022-02-04 DIAGNOSIS — M54.50 CHRONIC MIDLINE LOW BACK PAIN WITHOUT SCIATICA: ICD-10-CM

## 2022-02-04 DIAGNOSIS — I10 PRIMARY HYPERTENSION: ICD-10-CM

## 2022-02-04 DIAGNOSIS — M79.89 LEG SWELLING: Primary | ICD-10-CM

## 2022-02-04 DIAGNOSIS — G89.29 CHRONIC MIDLINE LOW BACK PAIN WITHOUT SCIATICA: ICD-10-CM

## 2022-02-04 PROCEDURE — 99214 OFFICE O/P EST MOD 30 MIN: CPT | Performed by: INTERNAL MEDICINE

## 2022-02-04 RX ORDER — AMLODIPINE BESYLATE AND BENAZEPRIL HYDROCHLORIDE 5; 20 MG/1; MG/1
1 CAPSULE ORAL DAILY
Qty: 30 CAPSULE | Refills: 5 | Status: SHIPPED | OUTPATIENT
Start: 2022-02-04 | End: 2022-07-08 | Stop reason: SDUPTHER

## 2022-02-04 NOTE — PROGRESS NOTES
"Subjective  Noa Sutton is a 89 y.o. female    HPI coming in for a follow-up she is accompanied by her son who is giving us some of the history.  Longstanding history of chronic back pain has become more debilitated over the last 6 months.  Has chronic lower extremity swelling history of hypertension.    The following portions of the patient's history were reviewed and updated as appropriate: allergies, current medications, past family history, past medical history, past social history, past surgical history, and problem list.     Review of Systems   Constitutional: Positive for fatigue. Negative for activity change, appetite change and fever.   HENT: Negative for congestion, ear discharge, ear pain and trouble swallowing.    Eyes: Negative for photophobia and visual disturbance.   Respiratory: Negative for cough and shortness of breath.    Cardiovascular: Positive for leg swelling. Negative for chest pain and palpitations.   Gastrointestinal: Negative for abdominal distention, abdominal pain, constipation, diarrhea, nausea and vomiting.   Endocrine: Negative.    Genitourinary: Negative for dysuria, hematuria and urgency.   Musculoskeletal: Positive for arthralgias, back pain and gait problem. Negative for joint swelling and myalgias.   Skin: Negative for color change and rash.   Allergic/Immunologic: Negative.    Neurological: Negative for dizziness, weakness, light-headedness and headaches.   Hematological: Negative for adenopathy. Does not bruise/bleed easily.   Psychiatric/Behavioral: Positive for sleep disturbance. Negative for agitation, confusion and dysphoric mood. The patient is not nervous/anxious.        Visit Vitals  /58   Pulse 68   Temp 98.2 °F (36.8 °C) (Infrared)   Ht 162.6 cm (64\")   Wt 64.4 kg (142 lb)   SpO2 96%   BMI 24.37 kg/m²       Objective  Physical Exam  Constitutional:       General: She is not in acute distress.     Appearance: She is well-developed.   HENT:      Nose: Nose " normal.   Eyes:      General: No scleral icterus.     Conjunctiva/sclera: Conjunctivae normal.   Neck:      Thyroid: No thyromegaly.      Trachea: No tracheal deviation.   Cardiovascular:      Rate and Rhythm: Normal rate and regular rhythm.      Heart sounds: No murmur heard.  No friction rub.   Pulmonary:      Effort: No respiratory distress.      Breath sounds: No wheezing or rales.   Abdominal:      General: There is no distension.      Palpations: Abdomen is soft. There is no mass.      Tenderness: There is no abdominal tenderness. There is no guarding.   Musculoskeletal:         General: Deformity present. Normal range of motion.      Right lower leg: Edema present.      Left lower leg: Edema present.      Comments: kyphosis   Lymphadenopathy:      Cervical: No cervical adenopathy.   Skin:     General: Skin is warm and dry.      Findings: No erythema or rash.   Neurological:      Mental Status: She is alert and oriented to person, place, and time.      Cranial Nerves: No cranial nerve deficit.      Coordination: Coordination normal.      Deep Tendon Reflexes: Reflexes are normal and symmetric.   Psychiatric:         Behavior: Behavior normal.         Thought Content: Thought content normal.         Judgment: Judgment normal.         Diagnoses and all orders for this visit:    Leg swelling will reduce amlodipine dose and her antihypertensive meds.  Follow BP readings at home discussed leg elevation, compression stockings as needed.  Advised to increase ambulation.  Start home health and home physical therapy  Chronic midline low back pain without sciatica Tylenol as needed heat pad as needed discussed stretching exercises advised to increase walking activity as tolerated    Primary hypertension stable.  Recent change in Lotrel dosing.  Son will be following up on BP readings at home    Other orders  -     amLODIPine-benazepril (Lotrel) 5-20 MG per capsule; Take 1 capsule by mouth Daily.

## 2022-02-15 ENCOUNTER — TELEPHONE (OUTPATIENT)
Dept: INTERNAL MEDICINE | Facility: CLINIC | Age: 87
End: 2022-02-15

## 2022-02-15 NOTE — TELEPHONE ENCOUNTER
Caller: DEBBIE [facility]    Relationship: Home Health    Best call back number: [phone]    What orders are you requesting: VERBAL ORDERS TO CONTINUE HOME HEALTH PHYSICAL THERAPY   TWICE PER WEEK FOR  FIVE WEEKS      In what timeframe would the patient need to come in: [facility] HOME HE[facility]    Where will you receive your lab/imaging services: [facility]     Additional notes: PLEASE CALL AND ADVISE

## 2022-02-17 DIAGNOSIS — I10 ESSENTIAL HYPERTENSION: Primary | ICD-10-CM

## 2022-02-17 DIAGNOSIS — M25.552 ARTHRALGIA OF LEFT HIP: Primary | ICD-10-CM

## 2022-02-17 RX ORDER — AMLODIPINE BESYLATE AND BENAZEPRIL HYDROCHLORIDE 10; 20 MG/1; MG/1
CAPSULE ORAL
Qty: 90 CAPSULE | Refills: 3 | OUTPATIENT
Start: 2022-02-17

## 2022-02-17 NOTE — TELEPHONE ENCOUNTER
Rx Refill Note  Requested Prescriptions     Pending Prescriptions Disp Refills   • amLODIPine-benazepril (LOTREL) 10-20 MG per capsule [Pharmacy Med Name: AMLODIPINE/BENAZEPRIL CAPS 10/20MG] 90 capsule 3     Sig: TAKE 1 CAPSULE DAILY   • metoprolol tartrate (LOPRESSOR) 25 MG tablet [Pharmacy Med Name: METOPROLOL TARTRATE TABS 25MG] 180 tablet 3     Sig: TAKE 1 TABLET TWICE A DAY      Last office visit with prescribing clinician: 2/4/2022      Next office visit with prescribing clinician: 5/5/2022            TONIO PIKE MA  02/17/22, 09:43 EST

## 2022-02-18 ENCOUNTER — OFFICE VISIT (OUTPATIENT)
Dept: ORTHOPEDIC SURGERY | Facility: CLINIC | Age: 87
End: 2022-02-18

## 2022-02-18 ENCOUNTER — OUTSIDE FACILITY SERVICE (OUTPATIENT)
Dept: INTERNAL MEDICINE | Facility: CLINIC | Age: 87
End: 2022-02-18

## 2022-02-18 VITALS — WEIGHT: 143 LBS | BODY MASS INDEX: 24.55 KG/M2 | TEMPERATURE: 98 F

## 2022-02-18 DIAGNOSIS — M40.00 KYPHOSIS (ACQUIRED) (POSTURAL): ICD-10-CM

## 2022-02-18 DIAGNOSIS — M54.50 BILATERAL LOW BACK PAIN, UNSPECIFIED CHRONICITY, UNSPECIFIED WHETHER SCIATICA PRESENT: ICD-10-CM

## 2022-02-18 DIAGNOSIS — S22.080A CLOSED WEDGE COMPRESSION FRACTURE OF T11 VERTEBRA, INITIAL ENCOUNTER: ICD-10-CM

## 2022-02-18 DIAGNOSIS — M25.551 ARTHRALGIA OF RIGHT HIP: ICD-10-CM

## 2022-02-18 DIAGNOSIS — M25.552 ARTHRALGIA OF LEFT HIP: Primary | ICD-10-CM

## 2022-02-18 DIAGNOSIS — Z87.81 HISTORY OF VERTEBRAL COMPRESSION FRACTURE: ICD-10-CM

## 2022-02-18 DIAGNOSIS — M80.00XA AGE-RELATED OSTEOPOROSIS WITH CURRENT PATHOLOGICAL FRACTURE, INITIAL ENCOUNTER: ICD-10-CM

## 2022-02-18 PROCEDURE — 72070 X-RAY EXAM THORAC SPINE 2VWS: CPT | Performed by: ORTHOPAEDIC SURGERY

## 2022-02-18 PROCEDURE — 73522 X-RAY EXAM HIPS BI 3-4 VIEWS: CPT | Performed by: ORTHOPAEDIC SURGERY

## 2022-02-18 PROCEDURE — 99202 OFFICE O/P NEW SF 15 MIN: CPT | Performed by: ORTHOPAEDIC SURGERY

## 2022-02-18 PROCEDURE — G0180 MD CERTIFICATION HHA PATIENT: HCPCS | Performed by: INTERNAL MEDICINE

## 2022-02-18 PROCEDURE — 72100 X-RAY EXAM L-S SPINE 2/3 VWS: CPT | Performed by: ORTHOPAEDIC SURGERY

## 2022-02-18 RX ORDER — HYDROCODONE BITARTRATE AND ACETAMINOPHEN 5; 325 MG/1; MG/1
1 TABLET ORAL EVERY 12 HOURS PRN
Qty: 14 TABLET | Refills: 0 | Status: SHIPPED | OUTPATIENT
Start: 2022-02-18 | End: 2022-07-08

## 2022-02-18 RX ORDER — DOCUSATE SODIUM 100 MG/1
100 CAPSULE, LIQUID FILLED ORAL 2 TIMES DAILY
Qty: 30 CAPSULE | Refills: 0 | Status: SHIPPED | OUTPATIENT
Start: 2022-02-18 | End: 2022-07-08

## 2022-02-22 ENCOUNTER — PATIENT ROUNDING (BHMG ONLY) (OUTPATIENT)
Dept: ORTHOPEDIC SURGERY | Facility: CLINIC | Age: 87
End: 2022-02-22

## 2022-02-22 NOTE — PROGRESS NOTES
"February 22, 2022    Hello, may I speak with Noa Sutton? Spoke to son, Alex, who accompanied her.    My name is Sweta Howard    I am  with [facility]  [address] [address] [phone].    Before we get started may I verify your date of birth? 10/7/1926    I am calling to officially welcome you to our practice and ask about your recent visit. Is this a good time to talk? yes    Tell me about your visit with us. What things went well?  \"Went very well, Dr. Hendricks was exceptional.\"       We're always looking for ways to make our patients' experiences even better. Do you have recommendations on ways we may improve?  no, \"Could not have gone better.\"    Overall were you satisfied with your first visit to our practice? yes       I appreciate you taking the time to speak with me today. Is there anything else I can do for you? no      Thank you, and have a great day.      "

## 2022-03-02 ENCOUNTER — TELEPHONE (OUTPATIENT)
Dept: INTERNAL MEDICINE | Facility: CLINIC | Age: 87
End: 2022-03-02

## 2022-03-02 NOTE — PROGRESS NOTES
Subjective   Patient ID: Noa Sutton is a 89 y.o. female and is being seen for orthopaedic evaluation today for low back and bilateral hip pain  Pain of the Left Hip (Here for bilateral hip pain.  States she fell 2/1/22 landing  backwards. Saw Dr. Blake 2/4/22. ) and Pain of the Right Hip      CHIEF COMPLAINT:    Low back, bilateral hip and thigh pain.    History of Present Illness     89 year old woman that presents with her son and they describe that she fell backward on 2-4-2-2022 and since that time has had increased low back and hip pain, first toward the right hip and more recently the left hip.  Pain can radiate to both thighs.  No pain radiating below the knees and no numbness or tingling symptoms.  No acute bowel or bladder changes.  Patient's son tells me she is having a difficult time with walking and can only go at most from room to room with her walker and is in pain.  Patient also has been developing some balance difficulty and even before the accidental mechanical fall.  The patient lives by herself and is in good health and has been quite independent, though with the injury and issues the past few weeks she has been with her son and family members for care and support.  The patient does have a history of hypertension, thyroid disease, osteoporosis and prior old vertebral compression fractures of T12, L1 and L2.  Patient presents for orthopaedic evaluation and treatment for her recent injury and condition.    Past Medical History:   Diagnosis Date   • Constipation    • Disease of thyroid gland    • Hypertension         Past Surgical History:   Procedure Laterality Date   • CHOLECYSTECTOMY     • THYROID SURGERY         Family History   Problem Relation Age of Onset   • Arthritis Mother    • Stroke Mother    • Heart attack Other    • Cancer Other         Social History     Socioeconomic History   • Marital status:    Tobacco Use   • Smoking status: Never Smoker   • Smokeless tobacco:  Never Used   Substance and Sexual Activity   • Alcohol use: No       No Known Allergies    Review of Systems   Constitutional: Negative for fever.   Gastrointestinal: Positive for constipation (chronic).   Genitourinary: Negative for dysuria.   Musculoskeletal: Positive for arthralgias, back pain, gait problem and myalgias. Negative for joint swelling.   Skin: Negative for color change, rash and wound.   Neurological: Positive for weakness (mild generalized weakness, no focal motor deficit).   Psychiatric/Behavioral: Negative for confusion.     I have reviewed the medical and surgical history, family history, social history, medications, and/or allergies, and the review of systems of this report.    Objective   Temp 98 °F (36.7 °C)   Wt 64.9 kg (143 lb)   BMI 24.55 kg/m²   Physical Exam  Vitals reviewed.   Constitutional:       General: She is not in acute distress.     Appearance: She is well-developed.   Musculoskeletal:      Lumbar back: Negative right straight leg raise test and negative left straight leg raise test.   Skin:     General: Skin is warm and dry.      Findings: No erythema or rash.   Neurological:      Mental Status: She is alert.   Psychiatric:         Speech: Speech normal.       Right Hip Exam     Tenderness   The patient is experiencing tenderness in the posterior (hip flexion and rotation well tolerated.  No hip or lower extremity defromity.).    Muscle Strength   Abduction: 4/5   Adduction: 5/5   Flexion: 4/5     Tests   LIAN: negative  Fadir:  Negative FADIR test      Left Hip Exam     Tenderness   The patient is experiencing tenderness in the posterior (no pain with hip flexion and rotation.).    Muscle Strength   Abduction: 4/5   Adduction: 5/5   Flexion: 4/5     Tests   LIAN: negative  Fadir:  Negative FADIR test      Back Exam     Tenderness   Back tenderness location: focal pain at thoracolumbar junction near the midline, chronic advanced spinal kyphosis.    Tests   Straight leg  raise right: negative  Straight leg raise left: negative    Reflexes   Patellar: normal  Achilles: normal    Other   Erythema: no back redness        Extremity DVT signs are negative on physical exam with negative Bridgett sign, no calf pain and no palpable cords   Neurologic Exam     Mental Status   Attention: normal.   Speech: speech is normal   Level of consciousness: alert  Knowledge: good.     Motor Exam   Overall muscle tone: normal    Gait, Coordination, and Reflexes     Gait  Gait: (slow though stable walker assist ambulation)     Assessment/Plan     Independent Review of Radiographic Studies:    AP and lateral of the thoracic spine, lumbar spine, pelvis and both hips, indication to evaluate pain and with prior comparison views, shows multiple compression fractures of the spine, and upon comparing with prior imaging, and counting vertebrae levels and reviewing with the radiologist, we concur there appears to be an acute T11 vertebral compression fracture, and severe and old T12 compression fracture with vertebra plana, and old chronic vertebral compression fractures of L1 and L2.  No evident acute fracture of the pelvis nor the hips and proximal femurs.    Laboratory and Other Studies:  No new results reviewed today.     Medical Decision Making:    Stable initial neurovascular and fracture exam.  Closed treatment of recent T11 vertebral compression fracture.  Medications as prescribed and only as tolerated.  Physical and occupational therapy planned.  Activity restrictions as appropriate.  TLSO brace fitted and provided to patient during this visit.  Walker protected ambulation with assistance as appropriate.    Procedures    Diagnoses and all orders for this visit:    1. Arthralgia of left hip (Primary)  -     XR Spine Lumbar 2 or 3 View  -     XR Spine Thoracic 2 View  -     HYDROcodone-acetaminophen (NORCO) 5-325 MG per tablet; Take 1 tablet by mouth Every 12 (Twelve) Hours As Needed (PRN pain).  Dispense:  14 tablet; Refill: 0    2. Bilateral low back pain, unspecified chronicity, unspecified whether sciatica present  -     XR Spine Lumbar 2 or 3 View  -     XR Spine Thoracic 2 View  -     HYDROcodone-acetaminophen (NORCO) 5-325 MG per tablet; Take 1 tablet by mouth Every 12 (Twelve) Hours As Needed (PRN pain).  Dispense: 14 tablet; Refill: 0    3. Arthralgia of right hip    4. Closed wedge compression fracture of T11 vertebra, initial encounter ([facility])    5. History of vertebral compression fracture  Comments:  T12, L1 and L2    6. Age-related osteoporosis with current pathological fracture, initial encounter    7. Kyphosis (acquired) (postural)    Other orders  -     docusate sodium (Colace) 100 MG capsule; Take 1 capsule by mouth 2 (Two) Times a Day.  Dispense: 30 capsule; Refill: 0       Discussion of orthopaedic goals and activities and patient and son expressed appreciation.  Risk, benefits, and merits of treatment alternatives reviewed with the patient and son and questions answered  Regular exercise as tolerated  Guided on proper techniques for mobility, strength, agility and/or conditioning exercises  Ice, heat, and/or modalities as beneficial  Reduced physical activity as appropriate and avoid offending activity  Cast, splint or brace care and assistive device usage instructions given  Physical therapy referral given  Take prescribed medications as instructed only as tolerated    Recommendations/Plan:  Exercise, medications, injections, other patient advice, and return appointment as noted.  Brace: Thoracolumbosacral brace - TLSO.  Referral: Physical and Occupational Therapy referral.  Test/Studies: No additional studies ordered at this time. Consider MRI or other imaging depending on clinical progress.  Surgery: Plan non-surgical treatment for current orthopaedic condition. For intractable painful limiting condition, consider referral for option of kyphoplasty  Work/Activity Status: Usual activities, no  strenuous activity. No driving.    Return in about 2 months (around 4/18/2022) for Recheck, XOA thoracolumbar spine.  Patient is encouraged and agreeable to call or return sooner for any issues or concerns.

## 2022-03-02 NOTE — TELEPHONE ENCOUNTER
Caller: LesleyIsaias    Relationship: Emergency Contact    Best call back number: [phone]    What medications are you currently taking:   Current Outpatient Medications on File Prior to Visit   Medication Sig Dispense Refill   • alendronate (FOSAMAX) 70 MG tablet TAKE 1 TABLET EVERY 7 DAYS 12 tablet 3   • amLODIPine-benazepril (Lotrel) 5-20 MG per capsule Take 1 capsule by mouth Daily. 30 capsule 5   • Cholecalciferol (VITAMIN D3) 1000 UNITS capsule Take 1 capsule by mouth Daily. Caltrate 600+D     • docusate sodium (Colace) 100 MG capsule Take 1 capsule by mouth 2 (Two) Times a Day. 30 capsule 0   • HYDROcodone-acetaminophen (NORCO) 5-325 MG per tablet Take 1 tablet by mouth Every 12 (Twelve) Hours As Needed (PRN pain). 14 tablet 0   • levothyroxine (SYNTHROID, LEVOTHROID) 100 MCG tablet TAKE 1 TABLET DAILY 90 tablet 3   • metoprolol tartrate (LOPRESSOR) 25 MG tablet TAKE 1 TABLET TWICE A  tablet 3   • omeprazole (priLOSEC) 20 MG capsule TAKE 1 CAPSULE DAILY 90 capsule 3   • Polyethyl Glycol-Propyl Glycol (SYSTANE OP) Apply  to eye(s) as directed by provider.       No current facility-administered medications on file prior to visit.            Which medication are you concerned about: AMLODIPINE    Who prescribed you this medication: DR RICH    What are your concerns: NO CONCERN BUT THE MEDICATION NEEDS TO BE FILLED [facility]HT [facility] GOING FOR[facility], MEDICATION WAS FILLED TODAY SO NO REFILL NEEDED FOR 90 DAYS

## 2022-03-24 RX ORDER — ALENDRONATE SODIUM 70 MG/1
TABLET ORAL
Qty: 12 TABLET | Refills: 3 | Status: SHIPPED | OUTPATIENT
Start: 2022-03-24

## 2022-03-24 NOTE — TELEPHONE ENCOUNTER
Rx Refill Note  Requested Prescriptions     Pending Prescriptions Disp Refills   • alendronate (FOSAMAX) 70 MG tablet [Pharmacy Med Name: ALENDRONATE SODIUM TABS 4'S 70MG] 12 tablet 3     Sig: TAKE 1 TABLET EVERY 7 DAYS      Last office visit with prescribing clinician: 2/4/2022      Next office visit with prescribing clinician: 5/5/2022            TONIO PIKE MA  03/24/22, 07:45 EDT

## 2022-04-15 DIAGNOSIS — M80.00XA AGE-RELATED OSTEOPOROSIS WITH CURRENT PATHOLOGICAL FRACTURE, INITIAL ENCOUNTER: ICD-10-CM

## 2022-04-15 DIAGNOSIS — Z87.81 HISTORY OF VERTEBRAL COMPRESSION FRACTURE: ICD-10-CM

## 2022-04-15 DIAGNOSIS — M25.552 ARTHRALGIA OF LEFT HIP: Primary | ICD-10-CM

## 2022-04-15 DIAGNOSIS — M40.00 KYPHOSIS (ACQUIRED) (POSTURAL): ICD-10-CM

## 2022-04-15 DIAGNOSIS — S22.080A CLOSED WEDGE COMPRESSION FRACTURE OF T11 VERTEBRA, INITIAL ENCOUNTER: ICD-10-CM

## 2022-04-15 DIAGNOSIS — M54.50 BILATERAL LOW BACK PAIN, UNSPECIFIED CHRONICITY, UNSPECIFIED WHETHER SCIATICA PRESENT: ICD-10-CM

## 2022-04-15 DIAGNOSIS — M25.551 ARTHRALGIA OF RIGHT HIP: ICD-10-CM

## 2022-07-08 ENCOUNTER — OFFICE VISIT (OUTPATIENT)
Dept: INTERNAL MEDICINE | Facility: CLINIC | Age: 87
End: 2022-07-08

## 2022-07-08 VITALS
HEIGHT: 64 IN | BODY MASS INDEX: 22.53 KG/M2 | DIASTOLIC BLOOD PRESSURE: 70 MMHG | OXYGEN SATURATION: 96 % | WEIGHT: 132 LBS | HEART RATE: 75 BPM | TEMPERATURE: 97.7 F | SYSTOLIC BLOOD PRESSURE: 124 MMHG

## 2022-07-08 DIAGNOSIS — E03.9 ACQUIRED HYPOTHYROIDISM: ICD-10-CM

## 2022-07-08 DIAGNOSIS — I10 ESSENTIAL HYPERTENSION: Primary | ICD-10-CM

## 2022-07-08 DIAGNOSIS — I48.0 PAROXYSMAL ATRIAL FIBRILLATION: ICD-10-CM

## 2022-07-08 PROCEDURE — 1170F FXNL STATUS ASSESSED: CPT | Performed by: INTERNAL MEDICINE

## 2022-07-08 PROCEDURE — 1159F MED LIST DOCD IN RCRD: CPT | Performed by: INTERNAL MEDICINE

## 2022-07-08 PROCEDURE — 93000 ELECTROCARDIOGRAM COMPLETE: CPT | Performed by: INTERNAL MEDICINE

## 2022-07-08 PROCEDURE — 99397 PER PM REEVAL EST PAT 65+ YR: CPT | Performed by: INTERNAL MEDICINE

## 2022-07-08 PROCEDURE — 1125F AMNT PAIN NOTED PAIN PRSNT: CPT | Performed by: INTERNAL MEDICINE

## 2022-07-08 PROCEDURE — G0439 PPPS, SUBSEQ VISIT: HCPCS | Performed by: INTERNAL MEDICINE

## 2022-07-08 RX ORDER — AMLODIPINE BESYLATE AND BENAZEPRIL HYDROCHLORIDE 5; 20 MG/1; MG/1
1 CAPSULE ORAL DAILY
Qty: 90 CAPSULE | Refills: 3 | Status: SHIPPED | OUTPATIENT
Start: 2022-07-08 | End: 2022-07-29 | Stop reason: HOSPADM

## 2022-07-08 NOTE — PROGRESS NOTES
The ABCs of the Annual Wellness Visit  Subsequent Medicare Wellness Visit    Chief Complaint   Patient presents with   • Medicare Wellness-subsequent      Subjective    History of Present Illness:  Noa Sutton is a 89 y.o. female who presents for a Subsequent Medicare Wellness Visit.    The following portions of the patient's history were reviewed and   updated as appropriate: allergies, current medications, past family history, past medical history, past social history, past surgical history and problem list.    Compared to one year ago, the patient feels her physical   health is worse.    Compared to one year ago, the patient feels her mental   health is the same.    Recent Hospitalizations:  She was not admitted to the hospital during the last year.       Current Medical Providers:  Patient Care Team:  Mtat Blake MD as PCP - General (Internal Medicine)  Carmen Neves PA-C as Referring Physician (Internal Medicine)  Kaden Grant MD as Consulting Physician (Ophthalmology)  Srinivasa Hendricks MD as Surgeon (Orthopedic Surgery)  Raymon Dela Cruz DPM as Consulting Physician (Podiatry)    Outpatient Medications Prior to Visit   Medication Sig Dispense Refill   • alendronate (FOSAMAX) 70 MG tablet TAKE 1 TABLET EVERY 7 DAYS 12 tablet 3   • amLODIPine-benazepril (Lotrel) 5-20 MG per capsule Take 1 capsule by mouth Daily. 30 capsule 5   • Cholecalciferol (VITAMIN D3) 1000 UNITS capsule Take 1 capsule by mouth Daily. Caltrate 600+D     • levothyroxine (SYNTHROID, LEVOTHROID) 100 MCG tablet TAKE 1 TABLET DAILY 90 tablet 3   • metoprolol tartrate (LOPRESSOR) 25 MG tablet TAKE 1 TABLET TWICE A  tablet 3   • omeprazole (priLOSEC) 20 MG capsule TAKE 1 CAPSULE DAILY 90 capsule 3   • Polyethyl Glycol-Propyl Glycol (SYSTANE OP) Apply  to eye(s) as directed by provider.     • docusate sodium (Colace) 100 MG capsule Take 1 capsule by mouth 2 (Two) Times a Day. 30 capsule 0   •  HYDROcodone-acetaminophen (NORCO) 5-325 MG per tablet Take 1 tablet by mouth Every 12 (Twelve) Hours As Needed (PRN pain). 14 tablet 0     No facility-administered medications prior to visit.       No opioid medication identified on active medication list. I have reviewed chart for other potential  high risk medication/s and harmful drug interactions in the elderly.          Aspirin is not on active medication list.  Aspirin use is not indicated based on review of current medical condition/s. Risk of harm outweighs potential benefits.  .    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  ACP discussion was held with the patient during this visit. Patient has an advance directive (not in EMR), copy requested.    Review of Systems   Constitutional: Positive for fatigue. Negative for activity change, appetite change and fever.   HENT: Negative for congestion, ear discharge, ear pain and trouble swallowing.    Eyes: Negative for photophobia and visual disturbance.   Respiratory: Negative for cough and shortness of breath.    Cardiovascular: Negative for chest pain and palpitations.   Gastrointestinal: Negative for abdominal distention, constipation, diarrhea, nausea and vomiting.   Genitourinary: Negative for dysuria, hematuria and urgency.   Musculoskeletal: Positive for arthralgias and back pain. Negative for joint swelling and myalgias.   Skin: Negative for color change and rash.   Neurological: Negative for dizziness, weakness,  "light-headedness and confusion.   Hematological: Negative for adenopathy. Does not bruise/bleed easily.   Psychiatric/Behavioral: Negative for agitation and dysphoric mood. The patient is not nervous/anxious.         Objective    Vitals:    07/08/22 1316   BP: 124/70   Pulse: 75   Temp: 97.7 °F (36.5 °C)   TempSrc: Infrared   SpO2: 96%   Weight: 59.9 kg (132 lb)   Height: 162.6 cm (64\")   PainSc:   7   PainLoc: Back     Estimated body mass index is 22.66 kg/m² as calculated from the following:    Height as of this encounter: 162.6 cm (64\").    Weight as of this encounter: 59.9 kg (132 lb).    BMI is within normal parameters. No other follow-up for BMI required.      Does the patient have evidence of cognitive impairment? No    Physical Exam  Constitutional:       General: She is not in acute distress.     Appearance: She is well-developed.   HENT:      Nose: Nose normal.   Eyes:      General: No scleral icterus.     Conjunctiva/sclera: Conjunctivae normal.   Neck:      Thyroid: No thyromegaly.      Trachea: No tracheal deviation.   Cardiovascular:      Rate and Rhythm: Normal rate. Rhythm irregular.      Heart sounds: No murmur heard.    No friction rub.   Pulmonary:      Effort: No respiratory distress.      Breath sounds: No wheezing or rales.   Abdominal:      General: There is no distension.      Palpations: Abdomen is soft. There is no mass.      Tenderness: There is no abdominal tenderness. There is no guarding.   Musculoskeletal:         General: No deformity. Normal range of motion.   Lymphadenopathy:      Cervical: No cervical adenopathy.   Skin:     General: Skin is warm and dry.      Findings: No erythema or rash.   Neurological:      Mental Status: She is alert and oriented to person, place, and time.      Cranial Nerves: No cranial nerve deficit.      Coordination: Coordination normal.      Deep Tendon Reflexes: Reflexes are normal and symmetric.   Psychiatric:         Behavior: Behavior normal.         " Thought Content: Thought content normal.         Judgment: Judgment normal.                 HEALTH RISK ASSESSMENT    Smoking Status:  Social History     Tobacco Use   Smoking Status Never Smoker   Smokeless Tobacco Never Used     Alcohol Consumption:  Social History     Substance and Sexual Activity   Alcohol Use No     Fall Risk Screen:    BRET Fall Risk Assessment was completed, and patient is at HIGH risk for falls. Assessment completed on:7/8/2022    Depression Screening:  PHQ-2/PHQ-9 Depression Screening 7/8/2022   Retired PHQ-9 Total Score -   Retired Total Score -   Little Interest or Pleasure in Doing Things 0-->not at all   Feeling Down, Depressed or Hopeless 1-->several days   PHQ-9: Brief Depression Severity Measure Score 1       Health Habits and Functional and Cognitive Screening:  Functional & Cognitive Status 7/8/2022   Do you have difficulty preparing food and eating? No   Do you have difficulty bathing yourself, getting dressed or grooming yourself? Yes   Do you have difficulty using the toilet? No   Do you have difficulty moving around from place to place? Yes   Do you have trouble with steps or getting out of a bed or a chair? Yes   Current Diet Well Balanced Diet        Current Diet Comment -   Dental Exam Up to date        Dental Exam Comment -   Eye Exam Up to date        Eye Exam Comment -   Exercise (times per week) 6 times per week   Current Exercises Include (No Data)        Exercise Comment PT type exercises    Current Exercise Activities Include -   Do you need help using the phone?  No   Are you deaf or do you have serious difficulty hearing?  Yes   Do you need help with transportation? -   Do you need help shopping? Yes   Do you need help preparing meals?  No   Do you need help with housework?  No   Do you need help with laundry? No   Do you need help taking your medications? No   Do you need help managing money? Yes   Do you ever drive or ride in a car without wearing a seat belt?  No   Have you felt unusual stress, anger or loneliness in the last month? No   Who do you live with? Alone   If you need help, do you have trouble finding someone available to you? No   Have you been bothered in the last four weeks by sexual problems? No   Do you have difficulty concentrating, remembering or making decisions? Yes       Age-appropriate Screening Schedule:  Refer to the list below for future screening recommendations based on patient's age, sex and/or medical conditions. Orders for these recommended tests are listed in the plan section. The patient has been provided with a written plan.    Health Maintenance   Topic Date Due   • TDAP/TD VACCINES (1 - Tdap) Never done   • DXA SCAN  06/18/2022   • ZOSTER VACCINE (2 of 2) 09/09/2029 (Originally 6/18/2013)   • INFLUENZA VACCINE  10/01/2022              Assessment & Plan   CMS Preventative Services Quick Reference  Risk Factors Identified During Encounter  Chronic Pain   Polypharmacy  Urinary Incontinence  The above risks/problems have been discussed with the patient.  Follow up actions/plans if indicated are seen below in the Assessment/Plan Section.  Pertinent information has been shared with the patient in the After Visit Summary.    Diagnoses and all orders for this visit:    1. Essential hypertension (Primary) stable with current meds and low-salt diet    2. Acquired hypothyroidism clinically euthyroid repeat TSH    3. Paroxysmal atrial fibrillation (HCC) incidental finding today but has had fatigue recently.  Denies lightheadedness dizziness.  No chest pain.  Check routine labs.  Referral to cardiology.  Metoprolol dose increased to 50 mg twice a day      ECG 12 Lead    Date/Time: 7/8/2022 5:35 PM  Performed by: Matt Blake MD  Authorized by: Matt Blake MD   Previous ECG: no previous ECG available  Rhythm: sinus rhythm and atrial fibrillation  Rate: tachycardic  ST Segments: ST segments normal  T Waves: T waves normal  QRS axis:  normal  Other findings: non-specific ST-T wave changes    Clinical impression: abnormal EKG            Follow Up:   No follow-ups on file.     An After Visit Summary and PPPS were made available to the patient.

## 2022-07-20 DIAGNOSIS — E03.9 ACQUIRED HYPOTHYROIDISM: ICD-10-CM

## 2022-07-20 LAB
ALBUMIN SERPL-MCNC: 4 G/DL (ref 3.5–5.2)
ALBUMIN/GLOB SERPL: 1.5 G/DL
ALP SERPL-CCNC: 112 U/L (ref 39–117)
ALT SERPL-CCNC: 88 U/L (ref 1–33)
AST SERPL-CCNC: 47 U/L (ref 1–32)
BILIRUB SERPL-MCNC: 0.7 MG/DL (ref 0–1.2)
BUN SERPL-MCNC: 13 MG/DL (ref 8–23)
BUN/CREAT SERPL: 17.1 (ref 7–25)
CALCIUM SERPL-MCNC: 9.2 MG/DL (ref 8.2–9.6)
CHLORIDE SERPL-SCNC: 91 MMOL/L (ref 98–107)
CO2 SERPL-SCNC: 28.3 MMOL/L (ref 22–29)
CREAT SERPL-MCNC: 0.76 MG/DL (ref 0.57–1)
EGFRCR SERPLBLD CKD-EPI 2021: 72.3 ML/MIN/1.73
ERYTHROCYTE [DISTWIDTH] IN BLOOD BY AUTOMATED COUNT: 12.5 % (ref 12.3–15.4)
GLOBULIN SER CALC-MCNC: 2.6 GM/DL
GLUCOSE SERPL-MCNC: 105 MG/DL (ref 65–99)
HCT VFR BLD AUTO: 37.9 % (ref 34–46.6)
HGB BLD-MCNC: 12.9 G/DL (ref 12–15.9)
MCH RBC QN AUTO: 31.2 PG (ref 26.6–33)
MCHC RBC AUTO-ENTMCNC: 34 G/DL (ref 31.5–35.7)
MCV RBC AUTO: 91.5 FL (ref 79–97)
PLATELET # BLD AUTO: 308 10*3/MM3 (ref 140–450)
POTASSIUM SERPL-SCNC: 5.1 MMOL/L (ref 3.5–5.2)
PROT SERPL-MCNC: 6.6 G/DL (ref 6–8.5)
RBC # BLD AUTO: 4.14 10*6/MM3 (ref 3.77–5.28)
SODIUM SERPL-SCNC: 130 MMOL/L (ref 136–145)
T4 FREE SERPL-MCNC: 1.87 NG/DL (ref 0.93–1.7)
TSH SERPL DL<=0.005 MIU/L-ACNC: 6.07 UIU/ML (ref 0.27–4.2)
WBC # BLD AUTO: 7.41 10*3/MM3 (ref 3.4–10.8)

## 2022-07-20 RX ORDER — LEVOTHYROXINE SODIUM 0.07 MG/1
75 TABLET ORAL DAILY
Qty: 90 TABLET | Refills: 3 | Status: SHIPPED | OUTPATIENT
Start: 2022-07-20 | End: 2022-08-18 | Stop reason: HOSPADM

## 2022-07-25 ENCOUNTER — APPOINTMENT (OUTPATIENT)
Dept: CT IMAGING | Facility: HOSPITAL | Age: 87
End: 2022-07-25

## 2022-07-25 ENCOUNTER — APPOINTMENT (OUTPATIENT)
Dept: GENERAL RADIOLOGY | Facility: HOSPITAL | Age: 87
End: 2022-07-25

## 2022-07-25 ENCOUNTER — HOSPITAL ENCOUNTER (INPATIENT)
Facility: HOSPITAL | Age: 87
LOS: 4 days | Discharge: HOME | End: 2022-07-29
Attending: EMERGENCY MEDICINE | Admitting: INTERNAL MEDICINE

## 2022-07-25 ENCOUNTER — TELEPHONE (OUTPATIENT)
Dept: INTERNAL MEDICINE | Facility: CLINIC | Age: 87
End: 2022-07-25

## 2022-07-25 DIAGNOSIS — I48.91 ATRIAL FIBRILLATION WITH RVR: ICD-10-CM

## 2022-07-25 DIAGNOSIS — E87.1 HYPONATREMIA: Primary | ICD-10-CM

## 2022-07-25 LAB
ALBUMIN SERPL-MCNC: 4.1 G/DL (ref 3.5–5.2)
ALBUMIN/GLOB SERPL: 2 G/DL
ALP SERPL-CCNC: 103 U/L (ref 39–117)
ALT SERPL W P-5'-P-CCNC: 57 U/L (ref 1–33)
ANION GAP SERPL CALCULATED.3IONS-SCNC: 10 MMOL/L (ref 5–15)
ANION GAP SERPL CALCULATED.3IONS-SCNC: 13.3 MMOL/L (ref 5–15)
AST SERPL-CCNC: 40 U/L (ref 1–32)
BASOPHILS # BLD AUTO: 0.03 10*3/MM3 (ref 0–0.2)
BASOPHILS NFR BLD AUTO: 0.3 % (ref 0–1.5)
BILIRUB SERPL-MCNC: 0.9 MG/DL (ref 0–1.2)
BILIRUB UR QL STRIP: NEGATIVE
BUN SERPL-MCNC: 7 MG/DL (ref 8–23)
BUN SERPL-MCNC: 8 MG/DL (ref 8–23)
BUN/CREAT SERPL: 12.5 (ref 7–25)
BUN/CREAT SERPL: 12.7 (ref 7–25)
CALCIUM SPEC-SCNC: 8.4 MG/DL (ref 8.2–9.6)
CALCIUM SPEC-SCNC: 9 MG/DL (ref 8.2–9.6)
CHLORIDE SERPL-SCNC: 83 MMOL/L (ref 98–107)
CHLORIDE SERPL-SCNC: 85 MMOL/L (ref 98–107)
CLARITY UR: ABNORMAL
CO2 SERPL-SCNC: 21.7 MMOL/L (ref 22–29)
CO2 SERPL-SCNC: 24 MMOL/L (ref 22–29)
COLOR UR: YELLOW
CREAT SERPL-MCNC: 0.55 MG/DL (ref 0.57–1)
CREAT SERPL-MCNC: 0.64 MG/DL (ref 0.57–1)
DEPRECATED RDW RBC AUTO: 43.9 FL (ref 37–54)
EGFRCR SERPLBLD CKD-EPI 2021: 81.5 ML/MIN/1.73
EGFRCR SERPLBLD CKD-EPI 2021: 84.5 ML/MIN/1.73
EOSINOPHIL # BLD AUTO: 0.01 10*3/MM3 (ref 0–0.4)
EOSINOPHIL NFR BLD AUTO: 0.1 % (ref 0.3–6.2)
ERYTHROCYTE [DISTWIDTH] IN BLOOD BY AUTOMATED COUNT: 13.2 % (ref 12.3–15.4)
GLOBULIN UR ELPH-MCNC: 2.1 GM/DL
GLUCOSE SERPL-MCNC: 111 MG/DL (ref 65–99)
GLUCOSE SERPL-MCNC: 144 MG/DL (ref 65–99)
GLUCOSE UR STRIP-MCNC: NEGATIVE MG/DL
HCT VFR BLD AUTO: 37.6 % (ref 34–46.6)
HGB BLD-MCNC: 12.9 G/DL (ref 12–15.9)
HGB UR QL STRIP.AUTO: NEGATIVE
HOLD SPECIMEN: NORMAL
HOLD SPECIMEN: NORMAL
IMM GRANULOCYTES # BLD AUTO: 0.04 10*3/MM3 (ref 0–0.05)
IMM GRANULOCYTES NFR BLD AUTO: 0.3 % (ref 0–0.5)
KETONES UR QL STRIP: ABNORMAL
LEUKOCYTE ESTERASE UR QL STRIP.AUTO: NEGATIVE
LYMPHOCYTES # BLD AUTO: 1.01 10*3/MM3 (ref 0.7–3.1)
LYMPHOCYTES NFR BLD AUTO: 8.6 % (ref 19.6–45.3)
MCH RBC QN AUTO: 30.9 PG (ref 26.6–33)
MCHC RBC AUTO-ENTMCNC: 34.3 G/DL (ref 31.5–35.7)
MCV RBC AUTO: 90 FL (ref 79–97)
MONOCYTES # BLD AUTO: 0.66 10*3/MM3 (ref 0.1–0.9)
MONOCYTES NFR BLD AUTO: 5.6 % (ref 5–12)
NEUTROPHILS NFR BLD AUTO: 85.1 % (ref 42.7–76)
NEUTROPHILS NFR BLD AUTO: 9.96 10*3/MM3 (ref 1.7–7)
NITRITE UR QL STRIP: NEGATIVE
NRBC BLD AUTO-RTO: 0 /100 WBC (ref 0–0.2)
NT-PROBNP SERPL-MCNC: 1416 PG/ML (ref 0–1800)
PH UR STRIP.AUTO: 5.5 [PH] (ref 5–8)
PLATELET # BLD AUTO: 264 10*3/MM3 (ref 140–450)
PMV BLD AUTO: 10.1 FL (ref 6–12)
POTASSIUM SERPL-SCNC: 4.3 MMOL/L (ref 3.5–5.2)
POTASSIUM SERPL-SCNC: 4.9 MMOL/L (ref 3.5–5.2)
PROT SERPL-MCNC: 6.2 G/DL (ref 6–8.5)
PROT UR QL STRIP: NEGATIVE
RBC # BLD AUTO: 4.18 10*6/MM3 (ref 3.77–5.28)
SODIUM SERPL-SCNC: 118 MMOL/L (ref 136–145)
SODIUM SERPL-SCNC: 119 MMOL/L (ref 136–145)
SP GR UR STRIP: 1.01 (ref 1–1.03)
TROPONIN T SERPL-MCNC: <0.01 NG/ML (ref 0–0.03)
UROBILINOGEN UR QL STRIP: ABNORMAL
WBC NRBC COR # BLD: 11.71 10*3/MM3 (ref 3.4–10.8)
WHOLE BLOOD HOLD COAG: NORMAL
WHOLE BLOOD HOLD SPECIMEN: NORMAL

## 2022-07-25 PROCEDURE — 84484 ASSAY OF TROPONIN QUANT: CPT

## 2022-07-25 PROCEDURE — 25010000002 ENOXAPARIN PER 10 MG: Performed by: NURSE PRACTITIONER

## 2022-07-25 PROCEDURE — 81003 URINALYSIS AUTO W/O SCOPE: CPT

## 2022-07-25 PROCEDURE — 73590 X-RAY EXAM OF LOWER LEG: CPT

## 2022-07-25 PROCEDURE — 70450 CT HEAD/BRAIN W/O DYE: CPT

## 2022-07-25 PROCEDURE — 83880 ASSAY OF NATRIURETIC PEPTIDE: CPT

## 2022-07-25 PROCEDURE — U0004 COV-19 TEST NON-CDC HGH THRU: HCPCS | Performed by: NURSE PRACTITIONER

## 2022-07-25 PROCEDURE — 99285 EMERGENCY DEPT VISIT HI MDM: CPT

## 2022-07-25 PROCEDURE — 99223 1ST HOSP IP/OBS HIGH 75: CPT | Performed by: NURSE PRACTITIONER

## 2022-07-25 PROCEDURE — 72125 CT NECK SPINE W/O DYE: CPT

## 2022-07-25 PROCEDURE — 80053 COMPREHEN METABOLIC PANEL: CPT

## 2022-07-25 PROCEDURE — 85025 COMPLETE CBC W/AUTO DIFF WBC: CPT

## 2022-07-25 PROCEDURE — 71045 X-RAY EXAM CHEST 1 VIEW: CPT

## 2022-07-25 RX ORDER — LEVOTHYROXINE SODIUM 0.07 MG/1
75 TABLET ORAL
Status: DISCONTINUED | OUTPATIENT
Start: 2022-07-26 | End: 2022-07-29 | Stop reason: HOSPADM

## 2022-07-25 RX ORDER — METOPROLOL SUCCINATE 25 MG/1
50 TABLET, EXTENDED RELEASE ORAL
Status: DISCONTINUED | OUTPATIENT
Start: 2022-07-25 | End: 2022-07-29 | Stop reason: HOSPADM

## 2022-07-25 RX ORDER — ONDANSETRON 2 MG/ML
4 INJECTION INTRAMUSCULAR; INTRAVENOUS EVERY 6 HOURS PRN
Status: DISCONTINUED | OUTPATIENT
Start: 2022-07-25 | End: 2022-07-29 | Stop reason: HOSPADM

## 2022-07-25 RX ORDER — ENOXAPARIN SODIUM 100 MG/ML
60 INJECTION SUBCUTANEOUS EVERY 12 HOURS
Status: DISCONTINUED | OUTPATIENT
Start: 2022-07-25 | End: 2022-07-28

## 2022-07-25 RX ORDER — ONDANSETRON 4 MG/1
4 TABLET, FILM COATED ORAL EVERY 6 HOURS PRN
Status: DISCONTINUED | OUTPATIENT
Start: 2022-07-25 | End: 2022-07-29 | Stop reason: HOSPADM

## 2022-07-25 RX ORDER — SODIUM CHLORIDE 9 MG/ML
30 INJECTION, SOLUTION INTRAVENOUS CONTINUOUS
Status: DISCONTINUED | OUTPATIENT
Start: 2022-07-25 | End: 2022-07-29 | Stop reason: HOSPADM

## 2022-07-25 RX ORDER — SODIUM CHLORIDE 0.9 % (FLUSH) 0.9 %
10 SYRINGE (ML) INJECTION AS NEEDED
Status: DISCONTINUED | OUTPATIENT
Start: 2022-07-25 | End: 2022-07-29 | Stop reason: HOSPADM

## 2022-07-25 RX ORDER — METOPROLOL TARTRATE 5 MG/5ML
5 INJECTION INTRAVENOUS
Status: DISCONTINUED | OUTPATIENT
Start: 2022-07-25 | End: 2022-07-29 | Stop reason: HOSPADM

## 2022-07-25 RX ORDER — SODIUM CHLORIDE 0.9 % (FLUSH) 0.9 %
10 SYRINGE (ML) INJECTION EVERY 12 HOURS SCHEDULED
Status: DISCONTINUED | OUTPATIENT
Start: 2022-07-25 | End: 2022-07-29 | Stop reason: HOSPADM

## 2022-07-25 RX ORDER — ACETAMINOPHEN 325 MG/1
650 TABLET ORAL EVERY 4 HOURS PRN
Status: DISCONTINUED | OUTPATIENT
Start: 2022-07-25 | End: 2022-07-29 | Stop reason: HOSPADM

## 2022-07-25 RX ORDER — PANTOPRAZOLE SODIUM 40 MG/1
40 TABLET, DELAYED RELEASE ORAL EVERY MORNING
Refills: 3 | Status: DISCONTINUED | OUTPATIENT
Start: 2022-07-26 | End: 2022-07-29 | Stop reason: HOSPADM

## 2022-07-25 RX ADMIN — SODIUM CHLORIDE 75 ML/HR: 9 INJECTION, SOLUTION INTRAVENOUS at 17:16

## 2022-07-25 RX ADMIN — Medication 10 ML: at 20:01

## 2022-07-25 RX ADMIN — METOPROLOL SUCCINATE 50 MG: 25 TABLET, EXTENDED RELEASE ORAL at 19:57

## 2022-07-25 RX ADMIN — ENOXAPARIN SODIUM 60 MG: 60 INJECTION SUBCUTANEOUS at 20:01

## 2022-07-25 NOTE — TELEPHONE ENCOUNTER
AB CALLED AND STATED PT FELL OFF BED. EYES SWELLED WITH BAGS UNDER THEM AND THAT HER LEGS ARE ALSO SWOLLEN AND HE SAID THAT SHE HAS BRUISING FROM THE FALL BUT DOESN'T THINK THE SYMPTOMS ARE FROM THAT.     HE WOULD LIKE TO GET HER IN ASAP SOT BE EVALUATED. SHE IS ALREADY SCHEDULED FOR 8-5-22 BUT TRYING TO GET IN EARLIER.

## 2022-07-26 ENCOUNTER — APPOINTMENT (OUTPATIENT)
Dept: CARDIOLOGY | Facility: HOSPITAL | Age: 87
End: 2022-07-26

## 2022-07-26 LAB
ANION GAP SERPL CALCULATED.3IONS-SCNC: 8.4 MMOL/L (ref 5–15)
ANION GAP SERPL CALCULATED.3IONS-SCNC: 8.9 MMOL/L (ref 5–15)
ANION GAP SERPL CALCULATED.3IONS-SCNC: 9.2 MMOL/L (ref 5–15)
ANION GAP SERPL CALCULATED.3IONS-SCNC: 9.7 MMOL/L (ref 5–15)
BH CV ECHO MEAS - AO MAX PG: 12.2 MMHG
BH CV ECHO MEAS - AO MEAN PG: 6.5 MMHG
BH CV ECHO MEAS - AO ROOT DIAM: 3 CM
BH CV ECHO MEAS - AO V2 MAX: 174.5 CM/SEC
BH CV ECHO MEAS - AO V2 VTI: 28.9 CM
BH CV ECHO MEAS - AVA(I,D): 0.61 CM2
BH CV ECHO MEAS - EDV(CUBED): 69.9 ML
BH CV ECHO MEAS - EDV(MOD-SP2): 16.4 ML
BH CV ECHO MEAS - EDV(MOD-SP4): 19.2 ML
BH CV ECHO MEAS - EF(MOD-BP): 58.1 %
BH CV ECHO MEAS - EF(MOD-SP2): 59 %
BH CV ECHO MEAS - EF(MOD-SP4): 52.3 %
BH CV ECHO MEAS - ESV(CUBED): 30.7 ML
BH CV ECHO MEAS - ESV(MOD-SP2): 6.7 ML
BH CV ECHO MEAS - ESV(MOD-SP4): 9.2 ML
BH CV ECHO MEAS - FS: 24 %
BH CV ECHO MEAS - IVS/LVPW: 1.27 CM
BH CV ECHO MEAS - IVSD: 1.18 CM
BH CV ECHO MEAS - LA DIMENSION: 3.5 CM
BH CV ECHO MEAS - LAT PEAK E' VEL: 9.3 CM/SEC
BH CV ECHO MEAS - LV MASS(C)D: 143.6 GRAMS
BH CV ECHO MEAS - LV MAX PG: 2.6 MMHG
BH CV ECHO MEAS - LV MEAN PG: 2 MMHG
BH CV ECHO MEAS - LV V1 MAX: 80.5 CM/SEC
BH CV ECHO MEAS - LV V1 VTI: 17.2 CM
BH CV ECHO MEAS - LVIDD: 4.1 CM
BH CV ECHO MEAS - LVIDS: 3.1 CM
BH CV ECHO MEAS - LVOT AREA: 1.02 CM2
BH CV ECHO MEAS - LVOT DIAM: 1.14 CM
BH CV ECHO MEAS - LVPWD: 0.93 CM
BH CV ECHO MEAS - MED PEAK E' VEL: 4.8 CM/SEC
BH CV ECHO MEAS - MR MAX PG: 92.2 MMHG
BH CV ECHO MEAS - MR MAX VEL: 480 CM/SEC
BH CV ECHO MEAS - MV A MAX VEL: 43.7 CM/SEC
BH CV ECHO MEAS - MV DEC SLOPE: 839 CM/SEC2
BH CV ECHO MEAS - MV DEC TIME: 0.15 MSEC
BH CV ECHO MEAS - MV E MAX VEL: 127 CM/SEC
BH CV ECHO MEAS - MV E/A: 2.9
BH CV ECHO MEAS - MV MAX PG: 6.5 MMHG
BH CV ECHO MEAS - MV MEAN PG: 3 MMHG
BH CV ECHO MEAS - MV V2 VTI: 28.5 CM
BH CV ECHO MEAS - MVA(VTI): 0.62 CM2
BH CV ECHO MEAS - PA ACC TIME: 0.06 SEC
BH CV ECHO MEAS - PA PR(ACCEL): 53.8 MMHG
BH CV ECHO MEAS - PA V2 MAX: 128 CM/SEC
BH CV ECHO MEAS - PI END-D VEL: 67.9 CM/SEC
BH CV ECHO MEAS - RAP SYSTOLE: 15 MMHG
BH CV ECHO MEAS - RV MAX PG: 1.54 MMHG
BH CV ECHO MEAS - RV V1 MAX: 62.1 CM/SEC
BH CV ECHO MEAS - RV V1 VTI: 10.4 CM
BH CV ECHO MEAS - RVSP: 40.1 MMHG
BH CV ECHO MEAS - SV(LVOT): 17.6 ML
BH CV ECHO MEAS - SV(MOD-SP2): 9.7 ML
BH CV ECHO MEAS - SV(MOD-SP4): 10 ML
BH CV ECHO MEAS - TAPSE (>1.6): 1.5 CM
BH CV ECHO MEAS - TR MAX PG: 25.1 MMHG
BH CV ECHO MEAS - TR MAX VEL: 249.3 CM/SEC
BH CV ECHO MEASUREMENTS AVERAGE E/E' RATIO: 18.01
BH CV XLRA - RV BASE: 4 CM
BH CV XLRA - RV LENGTH: 5.5 CM
BH CV XLRA - RV MID: 2.6 CM
BH CV XLRA - TDI S': 8.5 CM/SEC
BUN SERPL-MCNC: 6 MG/DL (ref 8–23)
BUN SERPL-MCNC: 7 MG/DL (ref 8–23)
BUN/CREAT SERPL: 10.3 (ref 7–25)
BUN/CREAT SERPL: 10.3 (ref 7–25)
BUN/CREAT SERPL: 12.5 (ref 7–25)
BUN/CREAT SERPL: 9.8 (ref 7–25)
CALCIUM SPEC-SCNC: 8.2 MG/DL (ref 8.2–9.6)
CALCIUM SPEC-SCNC: 8.3 MG/DL (ref 8.2–9.6)
CALCIUM SPEC-SCNC: 8.4 MG/DL (ref 8.2–9.6)
CALCIUM SPEC-SCNC: 8.7 MG/DL (ref 8.2–9.6)
CHLORIDE SERPL-SCNC: 86 MMOL/L (ref 98–107)
CHLORIDE SERPL-SCNC: 87 MMOL/L (ref 98–107)
CO2 SERPL-SCNC: 24.1 MMOL/L (ref 22–29)
CO2 SERPL-SCNC: 24.3 MMOL/L (ref 22–29)
CO2 SERPL-SCNC: 24.6 MMOL/L (ref 22–29)
CO2 SERPL-SCNC: 24.8 MMOL/L (ref 22–29)
CREAT SERPL-MCNC: 0.56 MG/DL (ref 0.57–1)
CREAT SERPL-MCNC: 0.58 MG/DL (ref 0.57–1)
CREAT SERPL-MCNC: 0.58 MG/DL (ref 0.57–1)
CREAT SERPL-MCNC: 0.61 MG/DL (ref 0.57–1)
EGFRCR SERPLBLD CKD-EPI 2021: 82.4 ML/MIN/1.73
EGFRCR SERPLBLD CKD-EPI 2021: 83.5 ML/MIN/1.73
EGFRCR SERPLBLD CKD-EPI 2021: 83.5 ML/MIN/1.73
EGFRCR SERPLBLD CKD-EPI 2021: 84.2 ML/MIN/1.73
GLUCOSE SERPL-MCNC: 108 MG/DL (ref 65–99)
GLUCOSE SERPL-MCNC: 87 MG/DL (ref 65–99)
GLUCOSE SERPL-MCNC: 91 MG/DL (ref 65–99)
GLUCOSE SERPL-MCNC: 98 MG/DL (ref 65–99)
MAXIMAL PREDICTED HEART RATE: 125 BPM
POTASSIUM SERPL-SCNC: 4.2 MMOL/L (ref 3.5–5.2)
POTASSIUM SERPL-SCNC: 4.3 MMOL/L (ref 3.5–5.2)
POTASSIUM SERPL-SCNC: 4.4 MMOL/L (ref 3.5–5.2)
POTASSIUM SERPL-SCNC: 4.8 MMOL/L (ref 3.5–5.2)
SARS-COV-2 RNA NOSE QL NAA+PROBE: NOT DETECTED
SODIUM SERPL-SCNC: 120 MMOL/L (ref 136–145)
SODIUM SERPL-SCNC: 121 MMOL/L (ref 136–145)
STRESS TARGET HR: 106 BPM

## 2022-07-26 PROCEDURE — 97161 PT EVAL LOW COMPLEX 20 MIN: CPT

## 2022-07-26 PROCEDURE — 97165 OT EVAL LOW COMPLEX 30 MIN: CPT

## 2022-07-26 PROCEDURE — 93306 TTE W/DOPPLER COMPLETE: CPT

## 2022-07-26 PROCEDURE — 93306 TTE W/DOPPLER COMPLETE: CPT | Performed by: INTERNAL MEDICINE

## 2022-07-26 PROCEDURE — 99232 SBSQ HOSP IP/OBS MODERATE 35: CPT | Performed by: NURSE PRACTITIONER

## 2022-07-26 PROCEDURE — 80048 BASIC METABOLIC PNL TOTAL CA: CPT | Performed by: INTERNAL MEDICINE

## 2022-07-26 PROCEDURE — 25010000002 ENOXAPARIN PER 10 MG: Performed by: NURSE PRACTITIONER

## 2022-07-26 RX ADMIN — ENOXAPARIN SODIUM 60 MG: 60 INJECTION SUBCUTANEOUS at 08:41

## 2022-07-26 RX ADMIN — Medication 10 ML: at 08:42

## 2022-07-26 RX ADMIN — Medication 10 ML: at 20:58

## 2022-07-26 RX ADMIN — PANTOPRAZOLE SODIUM 40 MG: 40 TABLET, DELAYED RELEASE ORAL at 06:00

## 2022-07-26 RX ADMIN — METOPROLOL SUCCINATE 50 MG: 25 TABLET, EXTENDED RELEASE ORAL at 08:41

## 2022-07-26 RX ADMIN — SODIUM CHLORIDE 75 ML/HR: 9 INJECTION, SOLUTION INTRAVENOUS at 06:03

## 2022-07-26 RX ADMIN — LEVOTHYROXINE SODIUM 75 MCG: 0.07 TABLET ORAL at 06:00

## 2022-07-26 RX ADMIN — ENOXAPARIN SODIUM 60 MG: 60 INJECTION SUBCUTANEOUS at 20:57

## 2022-07-26 RX ADMIN — AMLODIPINE BESYLATE: 5 TABLET ORAL at 08:43

## 2022-07-27 LAB
ANION GAP SERPL CALCULATED.3IONS-SCNC: 11.3 MMOL/L (ref 5–15)
ANION GAP SERPL CALCULATED.3IONS-SCNC: 14.6 MMOL/L (ref 5–15)
BUN SERPL-MCNC: 6 MG/DL (ref 8–23)
BUN SERPL-MCNC: 7 MG/DL (ref 8–23)
BUN/CREAT SERPL: 10.5 (ref 7–25)
BUN/CREAT SERPL: 11.1 (ref 7–25)
CALCIUM SPEC-SCNC: 7.9 MG/DL (ref 8.2–9.6)
CALCIUM SPEC-SCNC: 8.5 MG/DL (ref 8.2–9.6)
CHLORIDE SERPL-SCNC: 86 MMOL/L (ref 98–107)
CHLORIDE SERPL-SCNC: 88 MMOL/L (ref 98–107)
CO2 SERPL-SCNC: 17.4 MMOL/L (ref 22–29)
CO2 SERPL-SCNC: 27.7 MMOL/L (ref 22–29)
CREAT SERPL-MCNC: 0.57 MG/DL (ref 0.57–1)
CREAT SERPL-MCNC: 0.63 MG/DL (ref 0.57–1)
DEPRECATED RDW RBC AUTO: 43 FL (ref 37–54)
EGFRCR SERPLBLD CKD-EPI 2021: 81.8 ML/MIN/1.73
EGFRCR SERPLBLD CKD-EPI 2021: 83.8 ML/MIN/1.73
ERYTHROCYTE [DISTWIDTH] IN BLOOD BY AUTOMATED COUNT: 13.2 % (ref 12.3–15.4)
GLUCOSE SERPL-MCNC: 125 MG/DL (ref 65–99)
GLUCOSE SERPL-MCNC: 89 MG/DL (ref 65–99)
HCT VFR BLD AUTO: 32.9 % (ref 34–46.6)
HGB BLD-MCNC: 11.7 G/DL (ref 12–15.9)
MCH RBC QN AUTO: 31.7 PG (ref 26.6–33)
MCHC RBC AUTO-ENTMCNC: 35.6 G/DL (ref 31.5–35.7)
MCV RBC AUTO: 89.2 FL (ref 79–97)
OSMOLALITY SERPL: 255 MOSM/KG (ref 280–301)
OSMOLALITY UR: 208 MOSM/KG
PLATELET # BLD AUTO: 180 10*3/MM3 (ref 140–450)
PMV BLD AUTO: 11.3 FL (ref 6–12)
POTASSIUM SERPL-SCNC: 4 MMOL/L (ref 3.5–5.2)
POTASSIUM SERPL-SCNC: 4.7 MMOL/L (ref 3.5–5.2)
RBC # BLD AUTO: 3.69 10*6/MM3 (ref 3.77–5.28)
SODIUM SERPL-SCNC: 120 MMOL/L (ref 136–145)
SODIUM SERPL-SCNC: 125 MMOL/L (ref 136–145)
SODIUM UR-SCNC: 66 MMOL/L
URATE SERPL-MCNC: 2.7 MG/DL (ref 2.4–5.7)
WBC NRBC COR # BLD: 5.23 10*3/MM3 (ref 3.4–10.8)

## 2022-07-27 PROCEDURE — 84300 ASSAY OF URINE SODIUM: CPT | Performed by: INTERNAL MEDICINE

## 2022-07-27 PROCEDURE — 99222 1ST HOSP IP/OBS MODERATE 55: CPT | Performed by: INTERNAL MEDICINE

## 2022-07-27 PROCEDURE — 25010000002 ENOXAPARIN PER 10 MG: Performed by: NURSE PRACTITIONER

## 2022-07-27 PROCEDURE — 84550 ASSAY OF BLOOD/URIC ACID: CPT | Performed by: INTERNAL MEDICINE

## 2022-07-27 PROCEDURE — 80048 BASIC METABOLIC PNL TOTAL CA: CPT | Performed by: NURSE PRACTITIONER

## 2022-07-27 PROCEDURE — 83930 ASSAY OF BLOOD OSMOLALITY: CPT | Performed by: INTERNAL MEDICINE

## 2022-07-27 PROCEDURE — 97116 GAIT TRAINING THERAPY: CPT

## 2022-07-27 PROCEDURE — 97530 THERAPEUTIC ACTIVITIES: CPT

## 2022-07-27 PROCEDURE — 85027 COMPLETE CBC AUTOMATED: CPT | Performed by: NURSE PRACTITIONER

## 2022-07-27 PROCEDURE — 83935 ASSAY OF URINE OSMOLALITY: CPT | Performed by: INTERNAL MEDICINE

## 2022-07-27 PROCEDURE — 80048 BASIC METABOLIC PNL TOTAL CA: CPT | Performed by: INTERNAL MEDICINE

## 2022-07-27 PROCEDURE — 97535 SELF CARE MNGMENT TRAINING: CPT

## 2022-07-27 PROCEDURE — 97110 THERAPEUTIC EXERCISES: CPT

## 2022-07-27 PROCEDURE — 25010000002 FUROSEMIDE PER 20 MG: Performed by: INTERNAL MEDICINE

## 2022-07-27 PROCEDURE — 99232 SBSQ HOSP IP/OBS MODERATE 35: CPT | Performed by: NURSE PRACTITIONER

## 2022-07-27 RX ORDER — NICOTINE POLACRILEX 4 MG
1 LOZENGE BUCCAL
Status: DISCONTINUED | OUTPATIENT
Start: 2022-07-27 | End: 2022-07-27

## 2022-07-27 RX ORDER — FUROSEMIDE 10 MG/ML
20 INJECTION INTRAMUSCULAR; INTRAVENOUS EVERY 6 HOURS
Status: COMPLETED | OUTPATIENT
Start: 2022-07-27 | End: 2022-07-27

## 2022-07-27 RX ORDER — INSULIN ASPART 100 [IU]/ML
0-7 INJECTION, SOLUTION INTRAVENOUS; SUBCUTANEOUS
Status: DISCONTINUED | OUTPATIENT
Start: 2022-07-27 | End: 2022-07-27

## 2022-07-27 RX ORDER — SODIUM BICARBONATE 650 MG/1
650 TABLET ORAL 2 TIMES DAILY
Status: DISCONTINUED | OUTPATIENT
Start: 2022-07-27 | End: 2022-07-28

## 2022-07-27 RX ORDER — DEXTROSE MONOHYDRATE 25 G/50ML
25 INJECTION, SOLUTION INTRAVENOUS
Status: DISCONTINUED | OUTPATIENT
Start: 2022-07-27 | End: 2022-07-27

## 2022-07-27 RX ADMIN — SODIUM CHLORIDE 75 ML/HR: 9 INJECTION, SOLUTION INTRAVENOUS at 23:28

## 2022-07-27 RX ADMIN — METOPROLOL SUCCINATE 50 MG: 25 TABLET, EXTENDED RELEASE ORAL at 08:28

## 2022-07-27 RX ADMIN — FUROSEMIDE 20 MG: 10 INJECTION, SOLUTION INTRAMUSCULAR; INTRAVENOUS at 10:50

## 2022-07-27 RX ADMIN — SODIUM BICARBONATE 650 MG TABLET 650 MG: at 20:36

## 2022-07-27 RX ADMIN — SODIUM BICARBONATE 650 MG TABLET 650 MG: at 10:50

## 2022-07-27 RX ADMIN — LEVOTHYROXINE SODIUM 75 MCG: 0.07 TABLET ORAL at 06:08

## 2022-07-27 RX ADMIN — PANTOPRAZOLE SODIUM 40 MG: 40 TABLET, DELAYED RELEASE ORAL at 06:08

## 2022-07-27 RX ADMIN — ENOXAPARIN SODIUM 60 MG: 60 INJECTION SUBCUTANEOUS at 20:36

## 2022-07-27 RX ADMIN — FUROSEMIDE 20 MG: 10 INJECTION, SOLUTION INTRAMUSCULAR; INTRAVENOUS at 16:17

## 2022-07-27 RX ADMIN — Medication 10 ML: at 20:35

## 2022-07-27 RX ADMIN — ENOXAPARIN SODIUM 60 MG: 60 INJECTION SUBCUTANEOUS at 08:28

## 2022-07-27 RX ADMIN — AMLODIPINE BESYLATE: 5 TABLET ORAL at 08:28

## 2022-07-27 RX ADMIN — SODIUM CHLORIDE 75 ML/HR: 9 INJECTION, SOLUTION INTRAVENOUS at 06:08

## 2022-07-27 RX ADMIN — Medication 10 ML: at 08:28

## 2022-07-28 LAB
ANION GAP SERPL CALCULATED.3IONS-SCNC: 10.2 MMOL/L (ref 5–15)
ANION GAP SERPL CALCULATED.3IONS-SCNC: 8.6 MMOL/L (ref 5–15)
BUN SERPL-MCNC: 10 MG/DL (ref 8–23)
BUN SERPL-MCNC: 8 MG/DL (ref 8–23)
BUN/CREAT SERPL: 13.6 (ref 7–25)
BUN/CREAT SERPL: 14.7 (ref 7–25)
CALCIUM SPEC-SCNC: 8.5 MG/DL (ref 8.2–9.6)
CALCIUM SPEC-SCNC: 8.9 MG/DL (ref 8.2–9.6)
CHLORIDE SERPL-SCNC: 89 MMOL/L (ref 98–107)
CHLORIDE SERPL-SCNC: 91 MMOL/L (ref 98–107)
CO2 SERPL-SCNC: 27.4 MMOL/L (ref 22–29)
CO2 SERPL-SCNC: 30.8 MMOL/L (ref 22–29)
CREAT SERPL-MCNC: 0.59 MG/DL (ref 0.57–1)
CREAT SERPL-MCNC: 0.68 MG/DL (ref 0.57–1)
DEPRECATED RDW RBC AUTO: 45.2 FL (ref 37–54)
EGFRCR SERPLBLD CKD-EPI 2021: 80.3 ML/MIN/1.73
EGFRCR SERPLBLD CKD-EPI 2021: 83.1 ML/MIN/1.73
ERYTHROCYTE [DISTWIDTH] IN BLOOD BY AUTOMATED COUNT: 13.2 % (ref 12.3–15.4)
GLUCOSE SERPL-MCNC: 128 MG/DL (ref 65–99)
GLUCOSE SERPL-MCNC: 95 MG/DL (ref 65–99)
HCT VFR BLD AUTO: 35.5 % (ref 34–46.6)
HGB BLD-MCNC: 11.7 G/DL (ref 12–15.9)
MCH RBC QN AUTO: 30.9 PG (ref 26.6–33)
MCHC RBC AUTO-ENTMCNC: 33 G/DL (ref 31.5–35.7)
MCV RBC AUTO: 93.7 FL (ref 79–97)
PLATELET # BLD AUTO: 224 10*3/MM3 (ref 140–450)
PMV BLD AUTO: 10.1 FL (ref 6–12)
POTASSIUM SERPL-SCNC: 4 MMOL/L (ref 3.5–5.2)
POTASSIUM SERPL-SCNC: 4.2 MMOL/L (ref 3.5–5.2)
RBC # BLD AUTO: 3.79 10*6/MM3 (ref 3.77–5.28)
SODIUM SERPL-SCNC: 127 MMOL/L (ref 136–145)
SODIUM SERPL-SCNC: 130 MMOL/L (ref 136–145)
WBC NRBC COR # BLD: 5.82 10*3/MM3 (ref 3.4–10.8)

## 2022-07-28 PROCEDURE — 25010000002 FUROSEMIDE PER 20 MG: Performed by: INTERNAL MEDICINE

## 2022-07-28 PROCEDURE — 80048 BASIC METABOLIC PNL TOTAL CA: CPT | Performed by: INTERNAL MEDICINE

## 2022-07-28 PROCEDURE — 97110 THERAPEUTIC EXERCISES: CPT

## 2022-07-28 PROCEDURE — 25010000002 ENOXAPARIN PER 10 MG: Performed by: NURSE PRACTITIONER

## 2022-07-28 PROCEDURE — 85027 COMPLETE CBC AUTOMATED: CPT | Performed by: NURSE PRACTITIONER

## 2022-07-28 PROCEDURE — 97530 THERAPEUTIC ACTIVITIES: CPT

## 2022-07-28 PROCEDURE — 97116 GAIT TRAINING THERAPY: CPT

## 2022-07-28 PROCEDURE — 99232 SBSQ HOSP IP/OBS MODERATE 35: CPT | Performed by: NURSE PRACTITIONER

## 2022-07-28 PROCEDURE — 25010000002 ENOXAPARIN PER 10 MG

## 2022-07-28 RX ORDER — FUROSEMIDE 10 MG/ML
20 INJECTION INTRAMUSCULAR; INTRAVENOUS EVERY 6 HOURS
Status: COMPLETED | OUTPATIENT
Start: 2022-07-28 | End: 2022-07-28

## 2022-07-28 RX ORDER — LISINOPRIL 10 MG/1
10 TABLET ORAL
Status: DISCONTINUED | OUTPATIENT
Start: 2022-07-28 | End: 2022-07-29 | Stop reason: HOSPADM

## 2022-07-28 RX ORDER — ENOXAPARIN SODIUM 100 MG/ML
1 INJECTION SUBCUTANEOUS EVERY 12 HOURS
Status: DISCONTINUED | OUTPATIENT
Start: 2022-07-28 | End: 2022-07-29 | Stop reason: HOSPADM

## 2022-07-28 RX ADMIN — ENOXAPARIN SODIUM 60 MG: 60 INJECTION SUBCUTANEOUS at 09:40

## 2022-07-28 RX ADMIN — Medication 10 ML: at 20:40

## 2022-07-28 RX ADMIN — METOPROLOL SUCCINATE 50 MG: 25 TABLET, EXTENDED RELEASE ORAL at 09:39

## 2022-07-28 RX ADMIN — ENOXAPARIN SODIUM 70 MG: 80 INJECTION SUBCUTANEOUS at 20:40

## 2022-07-28 RX ADMIN — LEVOTHYROXINE SODIUM 75 MCG: 0.07 TABLET ORAL at 06:08

## 2022-07-28 RX ADMIN — FUROSEMIDE 20 MG: 10 INJECTION, SOLUTION INTRAMUSCULAR; INTRAVENOUS at 09:38

## 2022-07-28 RX ADMIN — LISINOPRIL 10 MG: 10 TABLET ORAL at 09:39

## 2022-07-28 RX ADMIN — FUROSEMIDE 20 MG: 10 INJECTION, SOLUTION INTRAMUSCULAR; INTRAVENOUS at 16:35

## 2022-07-28 RX ADMIN — PANTOPRAZOLE SODIUM 40 MG: 40 TABLET, DELAYED RELEASE ORAL at 09:39

## 2022-07-28 RX ADMIN — Medication 10 ML: at 09:39

## 2022-07-29 ENCOUNTER — READMISSION MANAGEMENT (OUTPATIENT)
Dept: CALL CENTER | Facility: HOSPITAL | Age: 87
End: 2022-07-29

## 2022-07-29 VITALS
RESPIRATION RATE: 18 BRPM | HEIGHT: 64 IN | SYSTOLIC BLOOD PRESSURE: 134 MMHG | BODY MASS INDEX: 24.8 KG/M2 | OXYGEN SATURATION: 100 % | HEART RATE: 90 BPM | TEMPERATURE: 98.3 F | DIASTOLIC BLOOD PRESSURE: 68 MMHG | WEIGHT: 145.28 LBS

## 2022-07-29 PROBLEM — I48.91 ATRIAL FIBRILLATION (HCC): Status: ACTIVE | Noted: 2022-07-29

## 2022-07-29 LAB
ANION GAP SERPL CALCULATED.3IONS-SCNC: 9.7 MMOL/L (ref 5–15)
BUN SERPL-MCNC: 9 MG/DL (ref 8–23)
BUN/CREAT SERPL: 14.3 (ref 7–25)
CALCIUM SPEC-SCNC: 8.7 MG/DL (ref 8.2–9.6)
CHLORIDE SERPL-SCNC: 89 MMOL/L (ref 98–107)
CO2 SERPL-SCNC: 32.3 MMOL/L (ref 22–29)
CREAT SERPL-MCNC: 0.63 MG/DL (ref 0.57–1)
EGFRCR SERPLBLD CKD-EPI 2021: 81.8 ML/MIN/1.73
GLUCOSE SERPL-MCNC: 93 MG/DL (ref 65–99)
POTASSIUM SERPL-SCNC: 4 MMOL/L (ref 3.5–5.2)
SODIUM SERPL-SCNC: 131 MMOL/L (ref 136–145)

## 2022-07-29 PROCEDURE — 80048 BASIC METABOLIC PNL TOTAL CA: CPT | Performed by: INTERNAL MEDICINE

## 2022-07-29 PROCEDURE — 25010000002 ENOXAPARIN PER 10 MG

## 2022-07-29 PROCEDURE — 99239 HOSP IP/OBS DSCHRG MGMT >30: CPT | Performed by: NURSE PRACTITIONER

## 2022-07-29 RX ORDER — METOPROLOL SUCCINATE 50 MG/1
50 TABLET, EXTENDED RELEASE ORAL
Qty: 30 TABLET | Refills: 0 | Status: SHIPPED | OUTPATIENT
Start: 2022-07-30 | End: 2022-08-05 | Stop reason: SDUPTHER

## 2022-07-29 RX ORDER — LISINOPRIL 10 MG/1
10 TABLET ORAL
Qty: 30 TABLET | Refills: 0 | Status: ON HOLD | OUTPATIENT
Start: 2022-07-30 | End: 2022-08-18 | Stop reason: SDUPTHER

## 2022-07-29 RX ORDER — FUROSEMIDE 20 MG/1
20 TABLET ORAL EVERY OTHER DAY
Qty: 15 TABLET | Refills: 0 | Status: SHIPPED | OUTPATIENT
Start: 2022-07-31 | End: 2022-08-18 | Stop reason: HOSPADM

## 2022-07-29 RX ORDER — FUROSEMIDE 20 MG/1
20 TABLET ORAL EVERY OTHER DAY
Status: DISCONTINUED | OUTPATIENT
Start: 2022-07-29 | End: 2022-07-29 | Stop reason: HOSPADM

## 2022-07-29 RX ADMIN — FUROSEMIDE 20 MG: 20 TABLET ORAL at 11:37

## 2022-07-29 RX ADMIN — ENOXAPARIN SODIUM 70 MG: 80 INJECTION SUBCUTANEOUS at 08:16

## 2022-07-29 RX ADMIN — LEVOTHYROXINE SODIUM 75 MCG: 0.07 TABLET ORAL at 06:25

## 2022-07-29 RX ADMIN — Medication 10 ML: at 08:16

## 2022-07-29 RX ADMIN — LISINOPRIL 10 MG: 10 TABLET ORAL at 08:16

## 2022-07-29 RX ADMIN — PANTOPRAZOLE SODIUM 40 MG: 40 TABLET, DELAYED RELEASE ORAL at 06:25

## 2022-07-29 RX ADMIN — METOPROLOL SUCCINATE 50 MG: 25 TABLET, EXTENDED RELEASE ORAL at 08:16

## 2022-07-29 NOTE — OUTREACH NOTE
Prep Survey    Flowsheet Row Responses   [facility] patient discharged from? [place]   Is LACE score < 7 ? No   Emergency Room discharge w/ pulse ox? No   Eligibility [facility]   Date of Admission 07/25/22   Date of Discharge 07/29/22   Discharge Disposition Home or Self Care   Discharge diagnosis New onset Afib with RVR,   Acute Metabolic Encephalopathy likely secondary to fall,    Hyponatremia   Does the patient have one of the following disease processes/diagnoses(primary or secondary)? Other   Does the patient have Home health ordered? No   Is there a DME ordered? No   Prep survey completed? Yes          HARMONY VITALE - Registered Nurse

## 2022-08-01 ENCOUNTER — TRANSITIONAL CARE MANAGEMENT TELEPHONE ENCOUNTER (OUTPATIENT)
Dept: CALL CENTER | Facility: HOSPITAL | Age: 87
End: 2022-08-01

## 2022-08-01 NOTE — CASE MANAGEMENT/SOCIAL WORK
Case Management Discharge Note           Provided Post Acute Provider List?: N/A  Provided Post Acute Provider Quality & Resource List?: N/A    Selected Continued Care - Discharged on 7/29/2022 Admission date: 7/25/2022 - Discharge disposition: Home or Self Care    Destination    No services have been selected for the patient.              Durable Medical Equipment    No services have been selected for the patient.              Dialysis/Infusion    No services have been selected for the patient.              Home Medical Care    No services have been selected for the patient.              Therapy    No services have been selected for the patient.              Community Resources    No services have been selected for the patient.              Community & DME    No services have been selected for the patient.                  Transportation Services  Private: Car    Final Discharge Disposition Code: 01 - home or self-care

## 2022-08-01 NOTE — OUTREACH NOTE
Call Center TCM Note    Flowsheet Row Responses   [facility] patient discharged from? [place]   Does the patient have one of the following disease processes/diagnoses(primary or secondary)? Other   TCM attempt successful? No  [verbal consent over a year old]   Unsuccessful attempts Attempt 2          ALEXANDRE SAAB RN    8/1/2022, 17:28 EDT

## 2022-08-01 NOTE — OUTREACH NOTE
Call Center TCM Note    Flowsheet Row Responses   [facility] patient discharged from? [place]   Does the patient have one of the following disease processes/diagnoses(primary or secondary)? Other   TCM attempt successful? No   Unsuccessful attempts Attempt 1          Vicky Kahn MA    8/1/2022, 16:46 EDT      
Marely Mack)

## 2022-08-02 ENCOUNTER — TRANSITIONAL CARE MANAGEMENT TELEPHONE ENCOUNTER (OUTPATIENT)
Dept: CALL CENTER | Facility: HOSPITAL | Age: 87
End: 2022-08-02

## 2022-08-02 NOTE — OUTREACH NOTE
Call Center TCM Note    Flowsheet Row Responses   [facility] patient discharged from? [place]   Does the patient have one of the following disease processes/diagnoses(primary or secondary)? Other   TCM attempt successful? No   Unsuccessful attempts Attempt 3   Wrap up additional comments Unable to reach pt x 3 attempts for TCM call. Pt is sched for TCM APPT with PCP Dr Blake on 08/05/2022.          Vicky Kahn MA    8/2/2022, 16:31 EDT

## 2022-08-05 ENCOUNTER — OFFICE VISIT (OUTPATIENT)
Dept: INTERNAL MEDICINE | Facility: CLINIC | Age: 87
End: 2022-08-05

## 2022-08-05 VITALS
SYSTOLIC BLOOD PRESSURE: 138 MMHG | WEIGHT: 147.8 LBS | OXYGEN SATURATION: 97 % | DIASTOLIC BLOOD PRESSURE: 72 MMHG | HEART RATE: 102 BPM | TEMPERATURE: 97.5 F | HEIGHT: 64 IN | BODY MASS INDEX: 25.23 KG/M2

## 2022-08-05 DIAGNOSIS — I48.21 PERMANENT ATRIAL FIBRILLATION: Primary | ICD-10-CM

## 2022-08-05 DIAGNOSIS — E03.9 ACQUIRED HYPOTHYROIDISM: ICD-10-CM

## 2022-08-05 DIAGNOSIS — E87.1 HYPONATREMIA: ICD-10-CM

## 2022-08-05 DIAGNOSIS — I10 ESSENTIAL HYPERTENSION: ICD-10-CM

## 2022-08-05 PROCEDURE — 99214 OFFICE O/P EST MOD 30 MIN: CPT | Performed by: INTERNAL MEDICINE

## 2022-08-05 PROCEDURE — 1111F DSCHRG MED/CURRENT MED MERGE: CPT | Performed by: INTERNAL MEDICINE

## 2022-08-05 RX ORDER — CHOLECALCIFEROL (VITAMIN D3) 125 MCG
50 CAPSULE ORAL DAILY
COMMUNITY

## 2022-08-05 RX ORDER — METOPROLOL SUCCINATE 100 MG/1
100 TABLET, EXTENDED RELEASE ORAL
Qty: 30 TABLET | Refills: 11 | Status: SHIPPED | OUTPATIENT
Start: 2022-08-05 | End: 2022-10-14

## 2022-08-05 NOTE — PROGRESS NOTES
Transitional Care Follow Up Visit  Subjective     Noa Sutton is a 89 y.o. female who presents for a transitional care management visit.    Within 48 business hours after discharge our office contacted her via telephone to coordinate her care and needs.      I reviewed and discussed the details of that call along with the discharge summary, hospital problems, inpatient lab results, inpatient diagnostic studies, and consultation reports with Noa.     Current outpatient and discharge medications have been reconciled for the patient.  Reviewed by: Matt Blake MD      Date of TCM Phone Call 7/29/2022   [facility]   Date of Admission 7/25/2022   Date of Discharge 7/29/2022   Discharge Disposition Home or Self Care     Risk for Readmission (LACE) Score: 7 (7/29/2022  6:01 AM)      History of Present Illness   Course During Hospital Stay: Patient with complains of fatigue confusion and frequent falls was evaluated through the emergency room where she was noted to have some bruising in her left lower leg imaging studies did not show evidence of a fracture.  Noted to be hyponatremic.  Nephrology consult was obtained.  It was thought that the hyponatremia was secondary to dehydration showed some improvement with IV saline.  She had recent onset of A. fib for which we had placed her on metoprolol as outpatient.  This persisted.  Cardiology consult was obtained that.  Decision for anticoagulation was deferred because of her recent history of falls.  She is scheduled for appointment with cardiology in a few weeks as outpatient.  CT of her brain showed evidence of atrophy and chronic ischemic white matter changes was discharged with a sodium level of around 130.  Continues to stay fatigued.  Does not have home health or home PT.  It appears that she was not approved for skilled rehab.  Some of the history provided by her son who is accompanying her today.  Patient is a poor historian.  She  lives by herself   The following portions of the patient's history were reviewed and updated as appropriate: allergies, current medications, past family history, past medical history, past social history, past surgical history and problem list.    Review of Systems   Constitutional: Positive for fatigue. Negative for activity change, appetite change and fever.   HENT: Negative for congestion, ear discharge, ear pain and trouble swallowing.    Eyes: Negative for photophobia and visual disturbance.   Respiratory: Positive for shortness of breath. Negative for cough.    Cardiovascular: Positive for leg swelling. Negative for chest pain and palpitations.   Gastrointestinal: Negative for abdominal distention, abdominal pain, constipation, diarrhea, nausea and vomiting.   Endocrine: Negative.    Genitourinary: Negative for dysuria, hematuria and urgency.   Musculoskeletal: Positive for arthralgias. Negative for back pain, joint swelling and myalgias.   Skin: Positive for wound. Negative for color change and rash.   Allergic/Immunologic: Negative.    Neurological: Negative for dizziness, weakness, light-headedness and headaches.   Hematological: Negative for adenopathy. Does not bruise/bleed easily.   Psychiatric/Behavioral: Positive for sleep disturbance. Negative for agitation, confusion and dysphoric mood. The patient is not nervous/anxious.        Objective   Physical Exam  Constitutional:       General: She is not in acute distress.     Appearance: She is well-developed.   HENT:      Nose: Nose normal.   Eyes:      General: No scleral icterus.     Conjunctiva/sclera: Conjunctivae normal.   Neck:      Thyroid: No thyromegaly.      Trachea: No tracheal deviation.   Cardiovascular:      Rate and Rhythm: Tachycardia present. Rhythm irregular.      Heart sounds: No murmur heard.    No friction rub.   Pulmonary:      Effort: No respiratory distress.      Breath sounds: No wheezing or rales.   Abdominal:      General: There  is no distension.      Palpations: Abdomen is soft. There is no mass.      Tenderness: There is no abdominal tenderness. There is no guarding.   Musculoskeletal:         General: Swelling, tenderness and deformity present. Normal range of motion.      Right lower leg: Edema present.   Lymphadenopathy:      Cervical: No cervical adenopathy.   Skin:     General: Skin is warm and dry.      Findings: Bruising present. No erythema or rash.      Comments: Rt leg ecchymosis   Neurological:      Mental Status: She is alert and oriented to person, place, and time.      Cranial Nerves: No cranial nerve deficit.      Coordination: Coordination normal.      Deep Tendon Reflexes: Reflexes are normal and symmetric.   Psychiatric:         Behavior: Behavior normal.         Thought Content: Thought content normal.         Judgment: Judgment normal.         Assessment & Plan   Diagnoses and all orders for this visit:    1. Permanent atrial fibrillation (HCC) (Primary) needs better rate control metoprolol dose increased 200 mg daily    2. Hyponatremia oral intake better has lower extremity edema.  On Lasix with evidence of fluid overload.  Check BNP    3. Acquired hypothyroidism clinically euthyroid recent TSH okay    4. Essential hypertension stable with current meds

## 2022-08-06 LAB
BUN SERPL-MCNC: 18 MG/DL (ref 8–23)
BUN/CREAT SERPL: 13.8 (ref 7–25)
CALCIUM SERPL-MCNC: 10.2 MG/DL (ref 8.2–9.6)
CHLORIDE SERPL-SCNC: 100 MMOL/L (ref 98–107)
CO2 SERPL-SCNC: 27.7 MMOL/L (ref 22–29)
CREAT SERPL-MCNC: 1.3 MG/DL (ref 0.57–1)
EGFRCR SERPLBLD CKD-EPI 2021: 37.9 ML/MIN/1.73
GLUCOSE SERPL-MCNC: 152 MG/DL (ref 65–99)
POTASSIUM SERPL-SCNC: 4.6 MMOL/L (ref 3.5–5.2)
SODIUM SERPL-SCNC: 140 MMOL/L (ref 136–145)

## 2022-08-08 ENCOUNTER — TELEPHONE (OUTPATIENT)
Dept: INTERNAL MEDICINE | Facility: CLINIC | Age: 87
End: 2022-08-08

## 2022-08-08 NOTE — TELEPHONE ENCOUNTER
----- Message from Matt Blake MD sent at 8/8/2022  5:13 PM EDT -----  Discussed results with son.  She needs an appointment in 2 weeks

## 2022-08-08 NOTE — TELEPHONE ENCOUNTER
Left detailed voice mail letting Alex know that I have scheduled Noa for 8/25 at 2:00. Requested a call back to reschedule if this day and time does not work.    Ok for [facility] to deliver

## 2022-08-09 ENCOUNTER — TELEPHONE (OUTPATIENT)
Dept: INTERNAL MEDICINE | Facility: CLINIC | Age: 87
End: 2022-08-09

## 2022-08-09 ENCOUNTER — HOME HEALTH ADMISSION (OUTPATIENT)
Dept: HOME HEALTH SERVICES | Facility: HOME HEALTHCARE | Age: 87
End: 2022-08-09

## 2022-08-09 NOTE — TELEPHONE ENCOUNTER
SON OF PATIENT HAS CALLED REQUESTING A CALL BACK WITH [facility] REFERRAL.    CALL BACK NUMBER [phone]

## 2022-08-10 ENCOUNTER — HOSPITAL ENCOUNTER (INPATIENT)
Facility: HOSPITAL | Age: 87
LOS: 8 days | Discharge: NURSING, REHAB OR LONG-TERM CARE | End: 2022-08-18
Attending: EMERGENCY MEDICINE | Admitting: STUDENT IN AN ORGANIZED HEALTH CARE EDUCATION/TRAINING PROGRAM

## 2022-08-10 ENCOUNTER — APPOINTMENT (OUTPATIENT)
Dept: GENERAL RADIOLOGY | Facility: HOSPITAL | Age: 87
End: 2022-08-10

## 2022-08-10 ENCOUNTER — APPOINTMENT (OUTPATIENT)
Dept: CT IMAGING | Facility: HOSPITAL | Age: 87
End: 2022-08-10

## 2022-08-10 ENCOUNTER — TELEPHONE (OUTPATIENT)
Dept: INTERNAL MEDICINE | Facility: CLINIC | Age: 87
End: 2022-08-10

## 2022-08-10 DIAGNOSIS — E87.70 HYPERVOLEMIA, UNSPECIFIED HYPERVOLEMIA TYPE: ICD-10-CM

## 2022-08-10 DIAGNOSIS — G93.40 ENCEPHALOPATHY ACUTE: ICD-10-CM

## 2022-08-10 DIAGNOSIS — E87.1 HYPONATREMIA: Primary | ICD-10-CM

## 2022-08-10 LAB
ALBUMIN SERPL-MCNC: 3.9 G/DL (ref 3.5–5.2)
ALBUMIN/GLOB SERPL: 1.7 G/DL
ALP SERPL-CCNC: 108 U/L (ref 39–117)
ALT SERPL W P-5'-P-CCNC: 34 U/L (ref 1–33)
ANION GAP SERPL CALCULATED.3IONS-SCNC: 11.1 MMOL/L (ref 5–15)
ANION GAP SERPL CALCULATED.3IONS-SCNC: 11.2 MMOL/L (ref 5–15)
AST SERPL-CCNC: 34 U/L (ref 1–32)
BASOPHILS # BLD AUTO: 0.01 10*3/MM3 (ref 0–0.2)
BASOPHILS NFR BLD AUTO: 0.1 % (ref 0–1.5)
BILIRUB SERPL-MCNC: 1.7 MG/DL (ref 0–1.2)
BILIRUB UR QL STRIP: NEGATIVE
BUN SERPL-MCNC: 10 MG/DL (ref 8–23)
BUN SERPL-MCNC: 11 MG/DL (ref 8–23)
BUN/CREAT SERPL: 20.4 (ref 7–25)
BUN/CREAT SERPL: 22 (ref 7–25)
CALCIUM SPEC-SCNC: 8.8 MG/DL (ref 8.2–9.6)
CALCIUM SPEC-SCNC: 9 MG/DL (ref 8.2–9.6)
CHLORIDE SERPL-SCNC: 74 MMOL/L (ref 98–107)
CHLORIDE SERPL-SCNC: 74 MMOL/L (ref 98–107)
CLARITY UR: CLEAR
CO2 SERPL-SCNC: 28.8 MMOL/L (ref 22–29)
CO2 SERPL-SCNC: 28.9 MMOL/L (ref 22–29)
COLOR UR: YELLOW
CREAT SERPL-MCNC: 0.49 MG/DL (ref 0.57–1)
CREAT SERPL-MCNC: 0.5 MG/DL (ref 0.57–1)
DEPRECATED RDW RBC AUTO: 38.7 FL (ref 37–54)
EGFRCR SERPLBLD CKD-EPI 2021: 86.5 ML/MIN/1.73
EGFRCR SERPLBLD CKD-EPI 2021: 86.9 ML/MIN/1.73
EOSINOPHIL # BLD AUTO: 0.04 10*3/MM3 (ref 0–0.4)
EOSINOPHIL NFR BLD AUTO: 0.4 % (ref 0.3–6.2)
ERYTHROCYTE [DISTWIDTH] IN BLOOD BY AUTOMATED COUNT: 12.4 % (ref 12.3–15.4)
FLUAV RNA RESP QL NAA+PROBE: NOT DETECTED
FLUBV RNA RESP QL NAA+PROBE: NOT DETECTED
GLOBULIN UR ELPH-MCNC: 2.3 GM/DL
GLUCOSE SERPL-MCNC: 108 MG/DL (ref 65–99)
GLUCOSE SERPL-MCNC: 120 MG/DL (ref 65–99)
GLUCOSE UR STRIP-MCNC: NEGATIVE MG/DL
HCT VFR BLD AUTO: 34.6 % (ref 34–46.6)
HGB BLD-MCNC: 12.2 G/DL (ref 12–15.9)
HGB UR QL STRIP.AUTO: NEGATIVE
HOLD SPECIMEN: NORMAL
HOLD SPECIMEN: NORMAL
IMM GRANULOCYTES # BLD AUTO: 0.05 10*3/MM3 (ref 0–0.05)
IMM GRANULOCYTES NFR BLD AUTO: 0.5 % (ref 0–0.5)
KETONES UR QL STRIP: NEGATIVE
LEUKOCYTE ESTERASE UR QL STRIP.AUTO: NEGATIVE
LYMPHOCYTES # BLD AUTO: 0.81 10*3/MM3 (ref 0.7–3.1)
LYMPHOCYTES NFR BLD AUTO: 7.9 % (ref 19.6–45.3)
MCH RBC QN AUTO: 30.4 PG (ref 26.6–33)
MCHC RBC AUTO-ENTMCNC: 35.3 G/DL (ref 31.5–35.7)
MCV RBC AUTO: 86.3 FL (ref 79–97)
MONOCYTES # BLD AUTO: 0.51 10*3/MM3 (ref 0.1–0.9)
MONOCYTES NFR BLD AUTO: 5 % (ref 5–12)
NEUTROPHILS NFR BLD AUTO: 8.85 10*3/MM3 (ref 1.7–7)
NEUTROPHILS NFR BLD AUTO: 86.1 % (ref 42.7–76)
NITRITE UR QL STRIP: NEGATIVE
NRBC BLD AUTO-RTO: 0 /100 WBC (ref 0–0.2)
NT-PROBNP SERPL-MCNC: 2821 PG/ML (ref 0–1800)
PH UR STRIP.AUTO: 7 [PH] (ref 5–8)
PLATELET # BLD AUTO: 382 10*3/MM3 (ref 140–450)
PMV BLD AUTO: 9.6 FL (ref 6–12)
POTASSIUM SERPL-SCNC: 4.4 MMOL/L (ref 3.5–5.2)
POTASSIUM SERPL-SCNC: 4.6 MMOL/L (ref 3.5–5.2)
PROT SERPL-MCNC: 6.2 G/DL (ref 6–8.5)
PROT UR QL STRIP: ABNORMAL
RBC # BLD AUTO: 4.01 10*6/MM3 (ref 3.77–5.28)
SARS-COV-2 RNA RESP QL NAA+PROBE: NOT DETECTED
SODIUM SERPL-SCNC: 114 MMOL/L (ref 136–145)
SODIUM SERPL-SCNC: 114 MMOL/L (ref 136–145)
SP GR UR STRIP: 1.01 (ref 1–1.03)
TROPONIN T SERPL-MCNC: <0.01 NG/ML (ref 0–0.03)
UROBILINOGEN UR QL STRIP: ABNORMAL
WBC NRBC COR # BLD: 10.27 10*3/MM3 (ref 3.4–10.8)
WHOLE BLOOD HOLD COAG: NORMAL
WHOLE BLOOD HOLD SPECIMEN: NORMAL

## 2022-08-10 PROCEDURE — 99222 1ST HOSP IP/OBS MODERATE 55: CPT | Performed by: NURSE PRACTITIONER

## 2022-08-10 PROCEDURE — 84484 ASSAY OF TROPONIN QUANT: CPT | Performed by: PHYSICIAN ASSISTANT

## 2022-08-10 PROCEDURE — 70450 CT HEAD/BRAIN W/O DYE: CPT

## 2022-08-10 PROCEDURE — 80053 COMPREHEN METABOLIC PANEL: CPT | Performed by: EMERGENCY MEDICINE

## 2022-08-10 PROCEDURE — P9612 CATHETERIZE FOR URINE SPEC: HCPCS

## 2022-08-10 PROCEDURE — 83880 ASSAY OF NATRIURETIC PEPTIDE: CPT | Performed by: PHYSICIAN ASSISTANT

## 2022-08-10 PROCEDURE — 25010000002 FUROSEMIDE PER 20 MG: Performed by: PHYSICIAN ASSISTANT

## 2022-08-10 PROCEDURE — 71045 X-RAY EXAM CHEST 1 VIEW: CPT

## 2022-08-10 PROCEDURE — 87636 SARSCOV2 & INF A&B AMP PRB: CPT | Performed by: PHYSICIAN ASSISTANT

## 2022-08-10 PROCEDURE — 81003 URINALYSIS AUTO W/O SCOPE: CPT | Performed by: EMERGENCY MEDICINE

## 2022-08-10 PROCEDURE — 85025 COMPLETE CBC W/AUTO DIFF WBC: CPT | Performed by: EMERGENCY MEDICINE

## 2022-08-10 PROCEDURE — 99284 EMERGENCY DEPT VISIT MOD MDM: CPT

## 2022-08-10 PROCEDURE — 36415 COLL VENOUS BLD VENIPUNCTURE: CPT

## 2022-08-10 RX ORDER — POLYETHYLENE GLYCOL 3350 17 G/17G
17 POWDER, FOR SOLUTION ORAL DAILY PRN
Status: DISCONTINUED | OUTPATIENT
Start: 2022-08-10 | End: 2022-08-18 | Stop reason: HOSPADM

## 2022-08-10 RX ORDER — AMOXICILLIN 250 MG
2 CAPSULE ORAL 2 TIMES DAILY
Status: DISCONTINUED | OUTPATIENT
Start: 2022-08-10 | End: 2022-08-18 | Stop reason: HOSPADM

## 2022-08-10 RX ORDER — BISACODYL 5 MG/1
5 TABLET, DELAYED RELEASE ORAL DAILY PRN
Status: DISCONTINUED | OUTPATIENT
Start: 2022-08-10 | End: 2022-08-18 | Stop reason: HOSPADM

## 2022-08-10 RX ORDER — SODIUM CHLORIDE 9 MG/ML
30 INJECTION, SOLUTION INTRAVENOUS CONTINUOUS
Status: DISCONTINUED | OUTPATIENT
Start: 2022-08-10 | End: 2022-08-12

## 2022-08-10 RX ORDER — LEVOTHYROXINE SODIUM 0.07 MG/1
75 TABLET ORAL DAILY
Status: DISCONTINUED | OUTPATIENT
Start: 2022-08-11 | End: 2022-08-11

## 2022-08-10 RX ORDER — BISACODYL 10 MG
10 SUPPOSITORY, RECTAL RECTAL DAILY PRN
Status: DISCONTINUED | OUTPATIENT
Start: 2022-08-10 | End: 2022-08-18 | Stop reason: HOSPADM

## 2022-08-10 RX ORDER — ACETAMINOPHEN 325 MG/1
650 TABLET ORAL EVERY 4 HOURS PRN
Status: DISCONTINUED | OUTPATIENT
Start: 2022-08-10 | End: 2022-08-18 | Stop reason: HOSPADM

## 2022-08-10 RX ORDER — FUROSEMIDE 10 MG/ML
80 INJECTION INTRAMUSCULAR; INTRAVENOUS ONCE
Status: DISCONTINUED | OUTPATIENT
Start: 2022-08-10 | End: 2022-08-10

## 2022-08-10 RX ORDER — ONDANSETRON 4 MG/1
4 TABLET, FILM COATED ORAL EVERY 6 HOURS PRN
Status: DISCONTINUED | OUTPATIENT
Start: 2022-08-10 | End: 2022-08-18 | Stop reason: HOSPADM

## 2022-08-10 RX ORDER — PANTOPRAZOLE SODIUM 40 MG/1
40 TABLET, DELAYED RELEASE ORAL EVERY MORNING
Refills: 3 | Status: DISCONTINUED | OUTPATIENT
Start: 2022-08-11 | End: 2022-08-18 | Stop reason: HOSPADM

## 2022-08-10 RX ORDER — FUROSEMIDE 10 MG/ML
20 INJECTION INTRAMUSCULAR; INTRAVENOUS ONCE
Status: COMPLETED | OUTPATIENT
Start: 2022-08-11 | End: 2022-08-11

## 2022-08-10 RX ORDER — SODIUM CHLORIDE 0.9 % (FLUSH) 0.9 %
10 SYRINGE (ML) INJECTION AS NEEDED
Status: DISCONTINUED | OUTPATIENT
Start: 2022-08-10 | End: 2022-08-18 | Stop reason: HOSPADM

## 2022-08-10 RX ORDER — SODIUM CHLORIDE 0.9 % (FLUSH) 0.9 %
10 SYRINGE (ML) INJECTION EVERY 12 HOURS SCHEDULED
Status: DISCONTINUED | OUTPATIENT
Start: 2022-08-10 | End: 2022-08-18 | Stop reason: HOSPADM

## 2022-08-10 RX ORDER — METOPROLOL SUCCINATE 25 MG/1
100 TABLET, EXTENDED RELEASE ORAL
Status: DISCONTINUED | OUTPATIENT
Start: 2022-08-11 | End: 2022-08-18 | Stop reason: HOSPADM

## 2022-08-10 RX ORDER — LISINOPRIL 10 MG/1
10 TABLET ORAL
Status: DISCONTINUED | OUTPATIENT
Start: 2022-08-11 | End: 2022-08-11

## 2022-08-10 RX ORDER — ONDANSETRON 2 MG/ML
4 INJECTION INTRAMUSCULAR; INTRAVENOUS EVERY 6 HOURS PRN
Status: DISCONTINUED | OUTPATIENT
Start: 2022-08-10 | End: 2022-08-18 | Stop reason: HOSPADM

## 2022-08-10 RX ORDER — FUROSEMIDE 10 MG/ML
40 INJECTION INTRAMUSCULAR; INTRAVENOUS ONCE
Status: COMPLETED | OUTPATIENT
Start: 2022-08-10 | End: 2022-08-10

## 2022-08-10 RX ADMIN — SODIUM CHLORIDE 30 ML/HR: 9 INJECTION, SOLUTION INTRAVENOUS at 17:53

## 2022-08-10 RX ADMIN — SENNOSIDES AND DOCUSATE SODIUM 2 TABLET: 50; 8.6 TABLET ORAL at 21:34

## 2022-08-10 RX ADMIN — FUROSEMIDE 40 MG: 10 INJECTION, SOLUTION INTRAMUSCULAR; INTRAVENOUS at 13:15

## 2022-08-10 RX ADMIN — Medication 10 ML: at 21:34

## 2022-08-10 NOTE — TELEPHONE ENCOUNTER
"Spoke with son.  States patient is \"talking out of her head\", \"swelling\", can't walk, can't talk.  States patient has \"declined significantly\" in the past week.  Advised son to take patient to ER.  "

## 2022-08-10 NOTE — H&P
[facility]   HISTORY AND PHYSICAL      Name:  Noa Sutton   Age:  89 y.o.  Sex:  female  :  10/7/1926  MRN:  [id number]   Visit Number:  [id number]  Admission Date:  8/10/2022  Date Of Service:  08/10/22  Primary Care Physician:  Matt Blake MD    Chief Complaint:     Weakness, Swelling, Confusion    History Of Presenting Illness:      Patient is a 89 year old female who presents to the emergency department today with son who complains of altered mental status for the past 3 days.  Patient's son noted patient does have some baseline confusion and dementia which has progressively worsened in the past 3 days.  States that she has been mumbling, staring off, and had increased generalized weakness.  Son denies any recent sick contacts or sickness, no cough, no fevers, no nausea, vomiting, or diarrhea.  Patient states she just feels weak and she hasn't been eating.  Son states she has plenty of food and she just hasn't eaten as much.  Patient with recent admission (2022) for hyponatremia and A.Fib with RVR after a mechanical fall at home.   Nephrology and Cardiology were consulted with previous admission.  Patient was discharged home on Lasix 20mg every other day and transitioned to Metoprolol XL after gentle diuresis and sodium stabilizing.  Patient followed up with her PCP (Hayden) who increased her Metoprolol and stopped her Lasix.  Son wanted short term rehab during last hospitalization but patient did not qualify.  Patient went home with caregivers coming to her home.  Son states that after last admission there were not around the clock caregivers present and that he had been staying with his mother off and on.  Advises he had been looking at private pay assisted living, although patient would not currently qualify for assisted living.  On exam, patient has received Lasix 40mg IV and has had 1L of UOP noted via purewick external catheter.    Work up in the emergency  department noted proBNP-2821, sodium-114, chloride-74, creatinine-0.50, and CBC grossly unremarkable.  CXR was done and noted cardiomegaly, bilateral diffuse pulmonary opacities, most likely edema and atelectasis, secondary to congestive heart failure, superimposed airspace disease cannot be excluded.  Urinalysis was without any acute infection.  CT of head was obtained and without any acute process.  Hospitalist was consulted for admission for hyponatremia.    Review Of Systems:    All systems were reviewed and negative except as mentioned in history of presenting illness, assessment and plan.    Past Medical History: Patient  has a past medical history of Constipation, Disease of thyroid gland, and Hypertension.    Past Surgical History: Patient  has a past surgical history that includes Cholecystectomy and Thyroid surgery.    Social History: Patient  reports that she has never smoked. She has never used smokeless tobacco. She reports that she does not drink alcohol.    Family History: Patient's family history includes Arthritis in her mother; Cancer in an other family member; Heart attack in an other family member; Stroke in her mother.    Allergies:      Patient has no known allergies.    Home Medications:    Prior to Admission Medications     Prescriptions Last Dose Informant Patient Reported? Taking?    alendronate (FOSAMAX) 70 MG tablet   No No    TAKE 1 TABLET EVERY 7 DAYS    Calcium Carbonate-Vitamin D (CALTRATE 600+D PO)   Yes No    Take 1 tablet by mouth Daily.    Cholecalciferol (VITAMIN D3 PO)   Yes No    Take 50 mcg by mouth Daily.    furosemide (LASIX) 20 MG tablet   No No    Take 1 tablet by mouth Every Other Day for 30 days.    levothyroxine (SYNTHROID, LEVOTHROID) 75 MCG tablet   No No    Take 1 tablet by mouth Daily.    lisinopril (PRINIVIL,ZESTRIL) 10 MG tablet   No No    Take 1 tablet by mouth Daily for 30 days.    metoprolol succinate XL (TOPROL-XL) 100 MG 24 hr tablet   No No    Take 1 tablet  "by mouth Daily for 30 days.    omeprazole (priLOSEC) 20 MG capsule   No No    TAKE 1 CAPSULE DAILY    Polyethyl Glycol-Propyl Glycol (SYSTANE OP)   Yes No    Apply  to eye(s) as directed by provider.        ED Medications:    Medications   sodium chloride 0.9 % flush 10 mL (has no administration in time range)   furosemide (LASIX) injection 40 mg (40 mg Intravenous Given 8/10/22 1315)     Vital Signs:  Temp:  [97.9 °F (36.6 °C)] 97.9 °F (36.6 °C)  Heart Rate:  [] 94  Resp:  [22] 22  BP: (132-173)/() 166/108        08/10/22  1144   Weight: 66.7 kg (147 lb)     Body mass index is 26.04 kg/m².    Physical Exam:     Most recent vital Signs: BP (!) 166/108   Pulse 94   Temp 97.9 °F (36.6 °C) (Oral)   Resp 22   Ht 160 cm (63\")   Wt 66.7 kg (147 lb)   SpO2 92%   BMI 26.04 kg/m²     Physical Exam  Vitals and nursing note reviewed.   Constitutional:       General: She is not in acute distress.     Appearance: She is normal weight. She is ill-appearing. She is not toxic-appearing.   HENT:      Head: Normocephalic and atraumatic.      Mouth/Throat:      Mouth: Mucous membranes are moist.   Eyes:      Pupils: Pupils are equal, round, and reactive to light.   Cardiovascular:      Rate and Rhythm: Normal rate. Rhythm irregular.      Pulses: Normal pulses.      Heart sounds: Normal heart sounds.   Pulmonary:      Effort: Pulmonary effort is normal.      Breath sounds: Normal breath sounds.      Comments: Crackles to bases  Abdominal:      General: Bowel sounds are normal.      Palpations: Abdomen is soft.   Musculoskeletal:         General: Swelling (right lower extremity) and tenderness (right lower extremity s/p fall a couple weeks ago) present. Normal range of motion.      Right lower leg: Edema (with weeping) present.      Left lower leg: Edema present.      Comments: Ecchymosis to right lower extremity after recent fall   Skin:     General: Skin is warm and dry.   Neurological:      General: No focal " deficit present.      Mental Status: She is alert. She is disoriented.      Motor: Weakness (generalized) present.      Comments: Baseline dementia--oriented to self, does not know year, month, and tells me that she is at [facility].   Psychiatric:         Mood and Affect: Mood normal.         Behavior: Behavior normal.         Laboratory data:    I have reviewed the labs done in the emergency room.    Results from last 7 days   Lab Units 08/10/22  1150 08/05/22  1212   SODIUM mmol/L 114* 140   POTASSIUM mmol/L 4.6 4.6   CHLORIDE mmol/L 74* 100   TOTAL CO2 mmol/L  --  27.7   CO2 mmol/L 28.8  --    BUN mg/dL 11 18   CREATININE mg/dL 0.50* 1.30*   CALCIUM mg/dL 9.0 10.2*   BILIRUBIN mg/dL 1.7*  --    ALK PHOS U/L 108  --    ALT (SGPT) U/L 34*  --    AST (SGOT) U/L 34*  --    GLUCOSE mg/dL 120* 152*     Results from last 7 days   Lab Units 08/10/22  1150   WBC 10*3/mm3 10.27   HEMOGLOBIN g/dL 12.2   HEMATOCRIT % 34.6   PLATELETS 10*3/mm3 382         Results from last 7 days   Lab Units 08/10/22  1150   TROPONIN T ng/mL <0.010     Results from last 7 days   Lab Units 08/10/22  1150   PROBNP pg/mL 2,821.0*                 Results from last 7 days   Lab Units 08/10/22  1201   COLOR UA  Yellow   GLUCOSE UA  Negative   KETONES UA  Negative   LEUKOCYTES UA  Negative   PH, URINE  7.0   BILIRUBIN UA  Negative   UROBILINOGEN UA  1.0 E.U./dL       Pain Management Panel    There is no flowsheet data to display.       Radiology:    CT Head Without Contrast    Result Date: 8/10/2022  HEAD CT     8/10/2022 1:08 PM  HISTORY: AMS X 3 days; E87.8-Wnol-icqjjzvykx and hyponatremia; E87.70-Fluid overload, unspecified; G93.40-Encephalopathy, unspecified  TECHNIQUE: Multiple axial CT images were performed from the foramen magnum to the vertex. Coronal reformatted images were reconstructed from axial data set. This study was performed with techniques to keep radiation doses as low as reasonably achievable (ALARA). Individualized dose  reduction techniques using automated exposure control or adjustment of mA and/or kV according to the patient size were employed. 729.69 mGy.cm  COMPARISON: CT head 07/25/2022.  FINDINGS: No acute intracranial hemorrhage or large acute cortical infarct. Chronic small vessel ischemic white matter changes and generalized cerebral volume loss are present. Ventricles are prominent. No midline shift. The basal cisterns are patent. No significant interval change. Intracranial arterial atherosclerotic calcifications. Plate and screw fixation of the mandible.  No skull fracture. The visualized paranasal sinuses and mastoid air cells are clear.      No acute intracranial hemorrhage or large acute cortical infarct. Chronic microvascular ischemic white matter changes. Global cerebral volume loss. No significant interval change.   CRITICAL RESULT: No.  COMMUNICATION: Per this written report.  Images personally reviewed, interpreted, and dictated by VIOLET Hobson.          This report was signed and finalized on 8/10/2022 1:22 PM by VIOLET Hobson.    XR Chest 1 View    Result Date: 8/10/2022  CLINICAL INDICATION:  lower extremity edema, recently taken off Lasix  EXAMINATION TECHNIQUE: XR CHEST 1 VW-  COMPARISON: Radiograph 07/25/2022.  FINDINGS: Cardiomegaly. Bilateral perihilar vascular engorgement and crowding. Bilateral diffuse linear and reticular opacities, radiating from the vamsi and extending peripherally. Ill-defined bibasilar opacities. No pneumothorax. Probable trace effusions. Surgical clips in the lower neck. No acute osseous or soft tissue abnormality.      Cardiomegaly. Bilateral diffuse pulmonary opacities, most likely edema and atelectasis, secondary to congestive heart failure. Superimposed airspace disease cannot be excluded.    Images personally reviewed, interpreted and dictated by VIOLET Hobson.       This report was signed and finalized on 8/10/2022 12:35 PM by Antonio Hsieh  VIOLET.      Assessment:    Hyponatremia, POA  Hypervolemia, POA  Right Leg Swelling s/p recent fall  Acute Encephalopathy, POA likely secondary to hyponatremia  Hypertension  Permanent Atrial Fibrillation not on anticoagulation  Hypothyroidism      Plan:    Hyponatremia/ Hypervolemia/ Acute Encephalopathy worse than baselin  -Admit patient to Med/Surg with telemetry monitoring  -Consult Dr. Antonio for hyponatremia; appreciate recommendations  -IV fluids with normal saline 30ml/hr  -Given 40mg Lasix IV in the ED  -Further diuresis after nephrology has consulted  -Strict I&Os  -Falls precautions  -BMPs every 6 hours  -Monitor patient mentation, likely due to hyponatremia in the setting of baseline dementia or could just be progressing dementia    Right leg swelling s/p recent fall  -Swelling and ecchymosis present   -Calf pain on palpation  -Will order doppler of right lower extremity to rule out DVT with recent trauma    Chronic--Afib/ Hypertension/ Hypothyroidism/ Debility  -Home medications as reconciled  -Consult PT/OT  -Consult Case Management for discharge planning, patient may need placement as she still lives alone.    Patient is a DNR/DNI on admission    Risk Assessment: Moderate due to age and co-morbidities  DVT Prophylaxis: SCDs  Code Status: DNR/DNI  Diet: Regular; Cardiac    Advance Care Planning   ACP discussion was held with the patient during this visit. Patient has an advance directive (not in EMR), copy requested.         Soraya Reyes, FARTUN  08/10/22  14:02 EDT    Dictated utilizing Dragon dictation.

## 2022-08-10 NOTE — PLAN OF CARE
Goal Outcome Evaluation:  Plan of Care Reviewed With: patient, son      interventions IMPLEMENTED AS APPROPRIATE

## 2022-08-10 NOTE — ED PROVIDER NOTES
Subjective   History of Present Illness   Patient is a 89-year-old female with history of A. fib, hyponatremia, hypothyroidism, hypertension presenting to the ER with complaints of altered mental status x3 days.  Patient's son is at bedside and states that the patient does have some baseline confusion/dementia but states that over the last 3 days it has gotten significantly worse to the point where she is talking incoherently and not making any sense.  Patient was admitted on 7- for new onset A. fib with RVR, hyponatremia, mechanical fall with weakness, acute metabolic encephalopathy.  Anticoagulation was discussed with cardiology during consult and patient and family opted to defer anticoagulation at this time due to high fall risk.  Patient was advised to follow-up with Dr. Montgomery, her cardiologist.  Nephrology was consulted for persistent hyponatremia and patient was gently diuresed and sodium did stabilize.  Family initially requested short-term rehab but patient did not qualify and they opted to take her home with caregivers.  Per son, patient currently lives alone and he did stay with her last night and states that it was a particularly rough night and she seemed very confused so he brought her in for further evaluation.  He states that the patient's legs have also become more swollen.  He states that after her hospitalization they followed up with her PCP, Dr. Blake who increased her metoprolol and discontinued her Lasix.  Upon discharge, she was supposed to take 20 mg every other day per nephrology.  Patient's son denies any falls since her hospitalization.    Review of Systems   Cardiovascular: Positive for leg swelling.   Psychiatric/Behavioral: Positive for confusion.   All other systems reviewed and are negative.      Past Medical History:   Diagnosis Date   • Constipation    • Disease of thyroid gland    • Hypertension        No Known Allergies    Past Surgical History:   Procedure Laterality  Date   • CHOLECYSTECTOMY     • THYROID SURGERY         Family History   Problem Relation Age of Onset   • Arthritis Mother    • Stroke Mother    • Heart attack Other    • Cancer Other        Social History     Socioeconomic History   • Marital status:    Tobacco Use   • Smoking status: Never Smoker   • Smokeless tobacco: Never Used   Substance and Sexual Activity   • Alcohol use: No           Objective   Physical Exam  Vitals and nursing note reviewed.   Constitutional:       General: She is not in acute distress.     Appearance: She is not toxic-appearing.   HENT:      Head: Normocephalic and atraumatic.   Eyes:      Extraocular Movements: Extraocular movements intact.   Cardiovascular:      Rate and Rhythm: Tachycardia present.      Heart sounds: Normal heart sounds.   Pulmonary:      Effort: Pulmonary effort is normal.      Breath sounds: Normal breath sounds.   Abdominal:      General: There is no distension.      Palpations: Abdomen is soft.   Musculoskeletal:         General: Normal range of motion.      Cervical back: Normal range of motion and neck supple.   Skin:     General: Skin is warm.      Comments: Ecchymosis noted to right lower extremity   Neurological:      Mental Status: She is alert. She is confused.      GCS: GCS eye subscore is 4. GCS verbal subscore is 4. GCS motor subscore is 6.      Cranial Nerves: No cranial nerve deficit, dysarthria or facial asymmetry.      Comments: Oriented to person, place, and year   Psychiatric:         Mood and Affect: Mood normal.         Cognition and Memory: Cognition is impaired. Memory is impaired.         Procedures           ED Course  ED Course as of 08/10/22 1330   Wed Aug 10, 2022   1302 Spoke with Dr. Kerley, hospitalist, who states he will admit the patient to telemetry after head CT is resulted. [AP]      ED Course User Index  [AP] Temitope Mcmahon, ELISE                                           [facility]   Patient was evaluated in the ER for  generalized weakness, increased lower extremity edema, and progressively worsening confusion over the last few days.  Patient is hemodynamically stable upon arrival, no acute distress, nontoxic-appearing on exam.  Patient is confused but oriented to person, place, and year.  Labs are remarkable for significant hyponatremia of 114 as well as hypochloremia of 74 and elevated BNP of 2821.Otherwise labs are unremarkable.   Chest x-ray is consistent with fluid overload.   Patient was given 40 mg IV Lasix as hyponatremia may be from fluid overload.  During last admission, patient was given IV fluids slowly and gently diuresed and sodium did improve.  5 days ago, it was 140.  Patient was supposed to be taking 20 mg Lasix every other day but this was discontinued by her PCP after discharge from the hospital.  CT head acutely unremarkable per radiology.  Hospitalist, Dr. Kerley, was consulted and graciously accepted the patient for admission for further evaluation and management.    Final diagnoses:   Hyponatremia   Hypervolemia, unspecified hypervolemia type   Encephalopathy acute       ED Disposition  ED Disposition     ED Disposition   Decision to Admit    Condition   --    Comment   Level of Care: Telemetry [5]   Diagnosis: Hyponatremia [321152]   Admitting Physician: KERLEY, BRIAN JOSEPH [[id number]]   Attending Physician: MARCIE JOEL [[id number]]   Isolate for COVID?: No [0]   Certification: I Certify That Inpatient Hospital Services Are Medically Necessary For Greater Than 2 Midnights               No follow-up provider specified.       Medication List      No changes were made to your prescriptions during this visit.          Temitope Mcmahon PA-C  08/10/22 6746

## 2022-08-11 ENCOUNTER — APPOINTMENT (OUTPATIENT)
Dept: ULTRASOUND IMAGING | Facility: HOSPITAL | Age: 87
End: 2022-08-11

## 2022-08-11 DIAGNOSIS — K21.9 GASTROESOPHAGEAL REFLUX DISEASE, UNSPECIFIED WHETHER ESOPHAGITIS PRESENT: ICD-10-CM

## 2022-08-11 LAB
ANION GAP SERPL CALCULATED.3IONS-SCNC: 6.6 MMOL/L (ref 5–15)
ANION GAP SERPL CALCULATED.3IONS-SCNC: 7 MMOL/L (ref 5–15)
ANION GAP SERPL CALCULATED.3IONS-SCNC: 8.4 MMOL/L (ref 5–15)
ANION GAP SERPL CALCULATED.3IONS-SCNC: 8.9 MMOL/L (ref 5–15)
ANION GAP SERPL CALCULATED.3IONS-SCNC: 9.9 MMOL/L (ref 5–15)
BUN SERPL-MCNC: 10 MG/DL (ref 8–23)
BUN SERPL-MCNC: 11 MG/DL (ref 8–23)
BUN SERPL-MCNC: 14 MG/DL (ref 8–23)
BUN SERPL-MCNC: 14 MG/DL (ref 8–23)
BUN SERPL-MCNC: 9 MG/DL (ref 8–23)
BUN/CREAT SERPL: 17.6 (ref 7–25)
BUN/CREAT SERPL: 21.3 (ref 7–25)
BUN/CREAT SERPL: 21.6 (ref 7–25)
BUN/CREAT SERPL: 23.3 (ref 7–25)
BUN/CREAT SERPL: 26.4 (ref 7–25)
CALCIUM SPEC-SCNC: 8 MG/DL (ref 8.2–9.6)
CALCIUM SPEC-SCNC: 8.1 MG/DL (ref 8.2–9.6)
CALCIUM SPEC-SCNC: 8.1 MG/DL (ref 8.2–9.6)
CALCIUM SPEC-SCNC: 8.2 MG/DL (ref 8.2–9.6)
CALCIUM SPEC-SCNC: 8.6 MG/DL (ref 8.2–9.6)
CHLORIDE SERPL-SCNC: 75 MMOL/L (ref 98–107)
CHLORIDE SERPL-SCNC: 76 MMOL/L (ref 98–107)
CHLORIDE SERPL-SCNC: 77 MMOL/L (ref 98–107)
CHLORIDE SERPL-SCNC: 77 MMOL/L (ref 98–107)
CHLORIDE SERPL-SCNC: 78 MMOL/L (ref 98–107)
CO2 SERPL-SCNC: 29.6 MMOL/L (ref 22–29)
CO2 SERPL-SCNC: 30.1 MMOL/L (ref 22–29)
CO2 SERPL-SCNC: 32.1 MMOL/L (ref 22–29)
CO2 SERPL-SCNC: 33.4 MMOL/L (ref 22–29)
CO2 SERPL-SCNC: 34 MMOL/L (ref 22–29)
CREAT SERPL-MCNC: 0.47 MG/DL (ref 0.57–1)
CREAT SERPL-MCNC: 0.51 MG/DL (ref 0.57–1)
CREAT SERPL-MCNC: 0.51 MG/DL (ref 0.57–1)
CREAT SERPL-MCNC: 0.53 MG/DL (ref 0.57–1)
CREAT SERPL-MCNC: 0.6 MG/DL (ref 0.57–1)
DEPRECATED RDW RBC AUTO: 39.1 FL (ref 37–54)
EGFRCR SERPLBLD CKD-EPI 2021: 82.8 ML/MIN/1.73
EGFRCR SERPLBLD CKD-EPI 2021: 85.3 ML/MIN/1.73
EGFRCR SERPLBLD CKD-EPI 2021: 86.1 ML/MIN/1.73
EGFRCR SERPLBLD CKD-EPI 2021: 86.1 ML/MIN/1.73
EGFRCR SERPLBLD CKD-EPI 2021: 87.8 ML/MIN/1.73
EOSINOPHIL # BLD MANUAL: 0.08 10*3/MM3 (ref 0–0.4)
EOSINOPHIL NFR BLD MANUAL: 1 % (ref 0.3–6.2)
ERYTHROCYTE [DISTWIDTH] IN BLOOD BY AUTOMATED COUNT: 12.6 % (ref 12.3–15.4)
GLUCOSE SERPL-MCNC: 104 MG/DL (ref 65–99)
GLUCOSE SERPL-MCNC: 105 MG/DL (ref 65–99)
GLUCOSE SERPL-MCNC: 122 MG/DL (ref 65–99)
GLUCOSE SERPL-MCNC: 95 MG/DL (ref 65–99)
GLUCOSE SERPL-MCNC: 99 MG/DL (ref 65–99)
HCT VFR BLD AUTO: 31.8 % (ref 34–46.6)
HGB BLD-MCNC: 11.4 G/DL (ref 12–15.9)
LYMPHOCYTES # BLD MANUAL: 1.01 10*3/MM3 (ref 0.7–3.1)
LYMPHOCYTES NFR BLD MANUAL: 3 % (ref 5–12)
MCH RBC QN AUTO: 30.6 PG (ref 26.6–33)
MCHC RBC AUTO-ENTMCNC: 35.8 G/DL (ref 31.5–35.7)
MCV RBC AUTO: 85.5 FL (ref 79–97)
MONOCYTES # BLD: 0.25 10*3/MM3 (ref 0.1–0.9)
NEUTROPHILS # BLD AUTO: 7.09 10*3/MM3 (ref 1.7–7)
NEUTROPHILS NFR BLD MANUAL: 84 % (ref 42.7–76)
OSMOLALITY SERPL: 241 MOSM/KG (ref 280–301)
OSMOLALITY UR: 257 MOSM/KG
PLATELET # BLD AUTO: 281 10*3/MM3 (ref 140–450)
PMV BLD AUTO: 9.6 FL (ref 6–12)
POTASSIUM SERPL-SCNC: 3.8 MMOL/L (ref 3.5–5.2)
POTASSIUM SERPL-SCNC: 3.9 MMOL/L (ref 3.5–5.2)
POTASSIUM SERPL-SCNC: 4 MMOL/L (ref 3.5–5.2)
RBC # BLD AUTO: 3.72 10*6/MM3 (ref 3.77–5.28)
RBC MORPH BLD: NORMAL
SMALL PLATELETS BLD QL SMEAR: ADEQUATE
SODIUM SERPL-SCNC: 115 MMOL/L (ref 136–145)
SODIUM SERPL-SCNC: 116 MMOL/L (ref 136–145)
SODIUM SERPL-SCNC: 117 MMOL/L (ref 136–145)
SODIUM SERPL-SCNC: 117 MMOL/L (ref 136–145)
SODIUM SERPL-SCNC: 118 MMOL/L (ref 136–145)
TSH SERPL DL<=0.05 MIU/L-ACNC: 9.14 UIU/ML (ref 0.27–4.2)
URATE SERPL-MCNC: 2.4 MG/DL (ref 2.4–5.7)
VARIANT LYMPHS NFR BLD MANUAL: 12 % (ref 19.6–45.3)
WBC MORPH BLD: NORMAL
WBC NRBC COR # BLD: 8.44 10*3/MM3 (ref 3.4–10.8)

## 2022-08-11 PROCEDURE — 83935 ASSAY OF URINE OSMOLALITY: CPT | Performed by: INTERNAL MEDICINE

## 2022-08-11 PROCEDURE — 97161 PT EVAL LOW COMPLEX 20 MIN: CPT

## 2022-08-11 PROCEDURE — 85007 BL SMEAR W/DIFF WBC COUNT: CPT | Performed by: NURSE PRACTITIONER

## 2022-08-11 PROCEDURE — 84443 ASSAY THYROID STIM HORMONE: CPT | Performed by: INTERNAL MEDICINE

## 2022-08-11 PROCEDURE — 83930 ASSAY OF BLOOD OSMOLALITY: CPT | Performed by: INTERNAL MEDICINE

## 2022-08-11 PROCEDURE — 93971 EXTREMITY STUDY: CPT

## 2022-08-11 PROCEDURE — 99232 SBSQ HOSP IP/OBS MODERATE 35: CPT | Performed by: NURSE PRACTITIONER

## 2022-08-11 PROCEDURE — 25010000002 FUROSEMIDE PER 20 MG: Performed by: INTERNAL MEDICINE

## 2022-08-11 PROCEDURE — 97165 OT EVAL LOW COMPLEX 30 MIN: CPT

## 2022-08-11 PROCEDURE — 84550 ASSAY OF BLOOD/URIC ACID: CPT | Performed by: INTERNAL MEDICINE

## 2022-08-11 PROCEDURE — 80048 BASIC METABOLIC PNL TOTAL CA: CPT | Performed by: INTERNAL MEDICINE

## 2022-08-11 PROCEDURE — 85027 COMPLETE CBC AUTOMATED: CPT | Performed by: NURSE PRACTITIONER

## 2022-08-11 PROCEDURE — 80048 BASIC METABOLIC PNL TOTAL CA: CPT | Performed by: NURSE PRACTITIONER

## 2022-08-11 RX ORDER — FUROSEMIDE 10 MG/ML
20 INJECTION INTRAMUSCULAR; INTRAVENOUS EVERY 6 HOURS
Status: COMPLETED | OUTPATIENT
Start: 2022-08-11 | End: 2022-08-11

## 2022-08-11 RX ORDER — LEVOTHYROXINE SODIUM 88 UG/1
88 TABLET ORAL DAILY
Status: DISCONTINUED | OUTPATIENT
Start: 2022-08-12 | End: 2022-08-18 | Stop reason: HOSPADM

## 2022-08-11 RX ORDER — OMEPRAZOLE 20 MG/1
CAPSULE, DELAYED RELEASE ORAL
Qty: 90 CAPSULE | Refills: 3 | Status: SHIPPED | OUTPATIENT
Start: 2022-08-11 | End: 2022-10-14

## 2022-08-11 RX ORDER — LISINOPRIL 5 MG/1
2.5 TABLET ORAL
Status: DISCONTINUED | OUTPATIENT
Start: 2022-08-12 | End: 2022-08-13

## 2022-08-11 RX ADMIN — FUROSEMIDE 20 MG: 10 INJECTION, SOLUTION INTRAMUSCULAR; INTRAVENOUS at 00:08

## 2022-08-11 RX ADMIN — SENNOSIDES AND DOCUSATE SODIUM 2 TABLET: 50; 8.6 TABLET ORAL at 08:37

## 2022-08-11 RX ADMIN — SODIUM CHLORIDE 50 ML/HR: 9 INJECTION, SOLUTION INTRAVENOUS at 16:23

## 2022-08-11 RX ADMIN — FUROSEMIDE 20 MG: 10 INJECTION, SOLUTION INTRAMUSCULAR; INTRAVENOUS at 16:19

## 2022-08-11 RX ADMIN — Medication 10 ML: at 21:39

## 2022-08-11 RX ADMIN — Medication 10 ML: at 08:38

## 2022-08-11 RX ADMIN — PANTOPRAZOLE SODIUM 40 MG: 40 TABLET, DELAYED RELEASE ORAL at 06:19

## 2022-08-11 RX ADMIN — LISINOPRIL 10 MG: 10 TABLET ORAL at 08:37

## 2022-08-11 RX ADMIN — FUROSEMIDE 20 MG: 10 INJECTION, SOLUTION INTRAMUSCULAR; INTRAVENOUS at 10:30

## 2022-08-11 RX ADMIN — LEVOTHYROXINE SODIUM 75 MCG: 0.07 TABLET ORAL at 08:38

## 2022-08-11 RX ADMIN — METOPROLOL SUCCINATE 100 MG: 25 TABLET, EXTENDED RELEASE ORAL at 08:37

## 2022-08-11 RX ADMIN — SENNOSIDES AND DOCUSATE SODIUM 2 TABLET: 50; 8.6 TABLET ORAL at 21:39

## 2022-08-11 NOTE — PLAN OF CARE
Goal Outcome Evaluation:  Plan of Care Reviewed With: patient        Progress: no change  Outcome Evaluation: Pt participated in PT eval this date. Pt required Davis to transfer to EOB. At EOB Pt required Davis to transfer to standing. Pt was able to take side steps with Davis but was very weak. Pt is expected to benefit from skilled PTx during this inpatient stay.

## 2022-08-11 NOTE — PLAN OF CARE
Goal Outcome Evaluation:  Plan of Care Reviewed With: patient        Progress: no change  Outcome Evaluation: Pt seen for OT evaluation today.  Pt presents with weakness and decreased independence with self care and functional mobility tasks. Pt able to sit eob with min assist, stood with min assist and walked 2' to head of bed with min assist using RW.  Pt is expected to benefit from skilled OT to improve her strength, endurance and independence with ADL tasks.

## 2022-08-11 NOTE — PROGRESS NOTES
[facility]    Name:  Noa Sutton   Age:  89 y.o.  Sex:  female  :  10/7/1926  MRN:  [id number]   Visit Number:  [id number]  Admission Date:  8/10/2022  Date Of Service:  22  Primary Care Physician:  Matt Blake MD     LOS: 1 day :    Chief Complaint:      Weakness and increased confusion    Subjective:    Patient seen and examined this morning.  Patient is found resting in bed on exam.  There is no family present at bedside.  She denies any pain or other problems on exam.  States she is feeling ok.  Vital signs are stable.  Nephrology is consulting due to low sodium.  She tells me we are at the apartment, and the year is , unsure of month.    Hospital Course:    Patient is a 89 year old female who presents to the emergency department today with son who complains of altered mental status for the past 3 days.  Patient's son noted patient does have some baseline confusion and dementia which has progressively worsened in the past 3 days.  States that she has been mumbling, staring off, and had increased generalized weakness.  Son denies any recent sick contacts or sickness, no cough, no fevers, no nausea, vomiting, or diarrhea.  Patient states she just feels weak and she hasn't been eating.  Son states she has plenty of food and she just hasn't eaten as much.  Patient with recent admission (2022) for hyponatremia and A.Fib with RVR after a mechanical fall at home.   Nephrology and Cardiology were consulted with previous admission.  Patient was discharged home on Lasix 20mg every other day and transitioned to Metoprolol XL after gentle diuresis and sodium stabilizing.  Patient followed up with her PCP (Hayden) who increased her Metoprolol and stopped her Lasix.  Son wanted short term rehab during last hospitalization but patient did not qualify.  Patient went home with caregivers coming to her home.  Son states that after last admission there  were not around the clock caregivers present and that he had been staying with his mother off and on.  Advises he had been looking at private pay assisted living, although patient would not currently qualify for assisted living.  On exam, patient has received Lasix 40mg IV and has had 1L of UOP noted via purewick external catheter.     Work up in the emergency department noted proBNP-2821, sodium-114, chloride-74, creatinine-0.50, and CBC grossly unremarkable.  CXR was done and noted cardiomegaly, bilateral diffuse pulmonary opacities, most likely edema and atelectasis, secondary to congestive heart failure, superimposed airspace disease cannot be excluded.  Urinalysis was without any acute infection.  CT of head was obtained and without any acute process.    Review of Systems:     All systems were reviewed and negative except as mentioned in subjective, assessment and plan.    Vital Signs:    Temp:  [96 °F (35.6 °C)-97.9 °F (36.6 °C)] 97.3 °F (36.3 °C)  Heart Rate:  [] 78  Resp:  [16-22] 16  BP: (129-173)/() 138/89    Intake and output:    I/O last 3 completed shifts:  In: -   Out: 1000 [Urine:1000]  I/O this shift:  In: 480 [P.O.:480]  Out: -     Physical Examination:    General Appearance:  Alert and cooperative, pleasant elderly female, no acute distress on exam, appears chronically ill   Head:  Atraumatic and normocephalic.   Eyes: Conjunctivae and sclerae normal, no icterus. No pallor.   Throat: No oral lesions, no thrush, oral mucosa moist.   Neck: Supple, trachea midline   Lungs:   Breath sounds heard bilaterally equally.  Crackles to bases   Heart:  Normal S1 and S2, irregular, no murmur, no gallop, no rub. No JVD.   Abdomen:   Normal bowel sounds, no masses, no organomegaly. Soft, nontender, nondistended, no rebound tenderness.   Extremities: Supple, bilateral lower extremity edema R>L with weeping to right leg, no cyanosis, no clubbing.   Skin: No bleeding or rash.   Neurologic: Alert and  disoriented at baseline due to advanced dementia. No facial asymmetry. Moves all four limbs. Generalized weakness is present.     Laboratory results:    Results from last 7 days   Lab Units 08/11/22  0610 08/10/22  2344 08/10/22  1749 08/10/22  1150   SODIUM mmol/L 117* 115* 114* 114*   POTASSIUM mmol/L 3.9 4.0 4.4 4.6   CHLORIDE mmol/L 77* 75* 74* 74*   CO2 mmol/L 33.4* 30.1* 28.9 28.8   BUN mg/dL 10 9 10 11   CREATININE mg/dL 0.47* 0.51* 0.49* 0.50*   CALCIUM mg/dL 8.0* 8.6 8.8 9.0   BILIRUBIN mg/dL  --   --   --  1.7*   ALK PHOS U/L  --   --   --  108   ALT (SGPT) U/L  --   --   --  34*   AST (SGOT) U/L  --   --   --  34*   GLUCOSE mg/dL 95 99 108* 120*     Results from last 7 days   Lab Units 08/11/22  0610 08/10/22  1150   WBC 10*3/mm3 8.44 10.27   HEMOGLOBIN g/dL 11.4* 12.2   HEMATOCRIT % 31.8* 34.6   PLATELETS 10*3/mm3 281 382         Results from last 7 days   Lab Units 08/10/22  1150   TROPONIN T ng/mL <0.010             I have reviewed the patient's laboratory results.    Radiology results:    CT Head Without Contrast    Result Date: 8/10/2022  HEAD CT     8/10/2022 1:08 PM  HISTORY: AMS X 3 days; E87.9-Ybbe-wikhvrfgov and hyponatremia; E87.70-Fluid overload, unspecified; G93.40-Encephalopathy, unspecified  TECHNIQUE: Multiple axial CT images were performed from the foramen magnum to the vertex. Coronal reformatted images were reconstructed from axial data set. This study was performed with techniques to keep radiation doses as low as reasonably achievable (ALARA). Individualized dose reduction techniques using automated exposure control or adjustment of mA and/or kV according to the patient size were employed. 729.69 mGy.cm  COMPARISON: CT head 07/25/2022.  FINDINGS: No acute intracranial hemorrhage or large acute cortical infarct. Chronic small vessel ischemic white matter changes and generalized cerebral volume loss are present. Ventricles are prominent. No midline shift. The basal cisterns are patent.  No significant interval change. Intracranial arterial atherosclerotic calcifications. Plate and screw fixation of the mandible.  No skull fracture. The visualized paranasal sinuses and mastoid air cells are clear.      Impression: No acute intracranial hemorrhage or large acute cortical infarct. Chronic microvascular ischemic white matter changes. Global cerebral volume loss. No significant interval change.   CRITICAL RESULT: No.  COMMUNICATION: Per this written report.  Images personally reviewed, interpreted, and dictated by VIOLET Hobson.          This report was signed and finalized on 8/10/2022 1:22 PM by VIOLET Hobson.    XR Chest 1 View    Result Date: 8/10/2022  CLINICAL INDICATION:  lower extremity edema, recently taken off Lasix  EXAMINATION TECHNIQUE: XR CHEST 1 VW-  COMPARISON: Radiograph 07/25/2022.  FINDINGS: Cardiomegaly. Bilateral perihilar vascular engorgement and crowding. Bilateral diffuse linear and reticular opacities, radiating from the vamsi and extending peripherally. Ill-defined bibasilar opacities. No pneumothorax. Probable trace effusions. Surgical clips in the lower neck. No acute osseous or soft tissue abnormality.      Impression: Cardiomegaly. Bilateral diffuse pulmonary opacities, most likely edema and atelectasis, secondary to congestive heart failure. Superimposed airspace disease cannot be excluded.    Images personally reviewed, interpreted and dictated by VIOLET Hobson.       This report was signed and finalized on 8/10/2022 12:35 PM by VIOLET Hobson.    I have reviewed the patient's radiology reports.    Medication Review:     I have reviewed the patient's active and prn medications.     Problem List:      Hyponatremia      Assessment:    Hyponatremia, POA  Hypervolemia, POA  Right Leg Swelling s/p recent fall  Acute Encephalopathy, POA likely secondary to hyponatremia  Hypertension  Permanent Atrial Fibrillation not on  anticoagulation  Hypothyroidism    Plan:    Hyponatremia/ Hypervolemia/ Acute Encephalopathy worse than baseline  -Dr. Antonio (nephrology) consulted for hyponatremia; appreciate recommendations  -IV fluids with normal saline 50 ml/hr  -Given 40mg Lasix IV in the ED  -Further diuresis per nephrology--Marisela planning to continue with diuresis today  -Strict I&Os  -Falls precautions  -Continue BMPs every 6 hours  -Monitor patient mentation, likely due to hyponatremia in the setting of baseline dementia or could just be progressing dementia  -Sodium at 117 this morning     Right leg swelling s/p recent fall  -Swelling and ecchymosis present   -Calf pain on palpation  -Doppler pending for today     Chronic--Afib/ Hypertension/ Hypothyroidism/ Debility  -Home medications as reconciled  -Consult PT/OT  -Case Management consulted for discharge planning, patient may need placement as she still lives alone.    DVT Prophylaxis: SCDs  Code Status: DNR/DNI  Diet: Regular/ Cardiac  Discharge Plan: 2-3 days    Soraya Reyes, APRN  08/11/22  10:18 EDT    Dictated utilizing Dragon dictation.

## 2022-08-11 NOTE — THERAPY EVALUATION
Patient Name: Noa Sutton  : 10/7/1926    MRN: [id number]                              Today's Date: 2022       Admit Date: 8/10/2022    Visit Dx:     ICD-10-CM ICD-9-CM   1. Hyponatremia  E87.1 276.1   2. Hypervolemia, unspecified hypervolemia type  E87.70 276.69   3. 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)   • Salzmann's nodular degeneration   • Hyponatremia   • Atrial fibrillation (HCC)     Past Medical History:   Diagnosis Date   • Constipation    • Disease of thyroid gland    • Hypertension      Past Surgical History:   Procedure Laterality Date   • CHOLECYSTECTOMY     • THYROID SURGERY        General Information     [facility] Name 22 1612          OT Time and Intention    Document Type evaluation  -     Mode of Treatment occupational therapy  -[facility] Name 22 161          General Information    Patient Profile Reviewed yes  -     Prior Level of Function --  unsure of pts PLOF  -     Existing Precautions/Restrictions fall  -     Barriers to Rehab previous functional deficit  -[facility] Name 22 1612          Occupational Profile    Reason for Services/Referral (Occupational Profile) ADL decline  -[facility] Name 22 1612          Living Environment    People in Home alone  pts son has been staying with her since she's been sick  -[facility] Name 22 1612          Cognition    Orientation Status (Cognition) oriented x 4;verbal cues/prompts needed for orientation  -[facility] Name 22 1612          Safety Issues,  Functional Mobility    Safety Issues Affecting Function (Mobility) safety precaution awareness;safety precautions follow-through/compliance;awareness of need for assistance;insight into deficits/self-awareness  -     Impairments Affecting Function (Mobility) balance;strength;motor planning;range of motion (ROM);endurance/activity tolerance  -           User Key  (r) = Recorded By, (t) = Taken By, (c) = Cosigned By    Initials Name Provider Type     Brandie Lazaro Occupational Therapist                 Mobility/ADL's     [facility] 08/11/22 1614          Bed Mobility    Bed Mobility supine-sit;sit-supine  -     Supine-Sit [place] (Bed Mobility) minimum assist (75% patient effort)  -     Sit-Supine [place] (Bed Mobility) minimum assist (75% patient effort)  -     Assistive Device (Bed Mobility) bed rails;draw sheet;head of bed elevated  -AH     Row Name 08/11/22 1614          Transfers    Transfers sit-stand transfer  -     Sit-Stand [place] (Transfers) minimum assist (75% patient effort)  -AH     Row Name 08/11/22 1614          Sit-Stand Transfer    Assistive Device (Sit-Stand Transfers) walker, front-wheeled  -AH     Row Name 08/11/22 1614          Functional Mobility    Functional Mobility- Ind. Level minimum assist (75% patient effort)  -     Functional Mobility- Device walker, front-wheeled  -     Functional Mobility-Distance (Feet) 2  -AH     Row Name 08/11/22 1614          Activities of Daily Living    BADL Assessment/Intervention bathing;upper body dressing;lower body dressing;grooming;feeding;toileting  -AH     Row Name 08/11/22 1614          Bathing Assessment/Intervention    [place] Level (Bathing) moderate assist (50% patient effort)  -AH     Row Name 08/11/22 1614          Upper Body Dressing Assessment/Training    [place] Level (Upper Body Dressing) minimum assist (75% patient effort)  -AH     Row Name 08/11/22 1614          Lower Body Dressing Assessment/Training     [place] Level (Lower Body Dressing) moderate assist (50% patient effort)  -AH     Row Name 08/11/22 1614          Grooming Assessment/Training    [place] Level (Grooming) minimum assist (75% patient effort)  -AH     Row Name 08/11/22 1614          Self-Feeding Assessment/Training    [place] Level (Feeding) set up  -AH     Row Name 08/11/22 1614          Toileting Assessment/Training    [place] Level (Toileting) moderate assist (50% patient effort)  -           User Key  (r) = Recorded By, (t) = Taken By, (c) = Cosigned By    Initials Name Provider Type     Brandie Lazaro Occupational Therapist               Obj/Interventions     Row Name 08/11/22 1615          Sensory Assessment (Somatosensory)    Sensory Assessment (Somatosensory) sensation intact  -AH     Row Name 08/11/22 1615          Vision Assessment/Intervention    Visual Impairment/Limitations WFL;corrective lenses full-time  -AH     Row Name 08/11/22 1615          Range of Motion Comprehensive    General Range of Motion bilateral upper extremity ROM WFL  -AH     Row Name 08/11/22 1615          Strength Comprehensive (MMT)    Comment, General Manual Muscle Testing (MMT) Assessment BUE WFL for pts age  -           User Key  (r) = Recorded By, (t) = Taken By, (c) = Cosigned By    Initials Name Provider Type     Brandie Lazaro Occupational Therapist               Goals/Plan     Row Name 08/11/22 1619          Bed Mobility Goal 1 (OT)    Activity/Assistive Device (Bed Mobility Goal 1, OT) bed mobility activities, all  -     [place] Level/Cues Needed (Bed Mobility Goal 1, OT) contact guard required  -     Time Frame (Bed Mobility Goal 1, OT) by discharge  -     Progress/Outcomes (Bed Mobility Goal 1, OT) goal ongoing  -AH     Row Name 08/11/22 1619          Transfer Goal 1 (OT)    Activity/Assistive Device (Transfer Goal 1, OT) sit-to-stand/stand-to-sit;walker, rolling  -     [place] Level/Cues Needed (Transfer  "Goal 1, OT) contact guard required  -     Time Frame (Transfer Goal 1, OT) by discharge  -     Progress/Outcome (Transfer Goal 1, OT) goal ongoing  -AH     Row Name 08/11/22 1619          Bathing Goal 1 (OT)    Activity/Device (Bathing Goal 1, OT) bathing skills, all  -     [place] Level/Cues Needed (Bathing Goal 1, OT) contact guard required  -     Time Frame (Bathing Goal 1, OT) long term goal (LTG);1 week  -     Progress/Outcomes (Bathing Goal 1, OT) goal ongoing  -AH     Row Name 08/11/22 1619          Dressing Goal 1 (OT)    Activity/Device (Dressing Goal 1, OT) lower body dressing  -     [place]/Cues Needed (Dressing Goal 1, OT) contact guard required  -     Time Frame (Dressing Goal 1, OT) by discharge  -     Progress/Outcome (Dressing Goal 1, OT) goal ongoing  -AH     Row Name 08/11/22 1619          Strength Goal 1 (OT)    Strength Goal 1 (OT) Pt will perform UB strengthening ex using theraband for resistance.  -     Time Frame (Strength Goal 1, OT) by discharge  -     Progress/Outcome (Strength Goal 1, OT) goal ongoing  -AH     Row Name 08/11/22 1619          Therapy Assessment/Plan (OT)    Planned Therapy Interventions (OT) activity tolerance training;BADL retraining;patient/caregiver education/training;transfer/mobility retraining;strengthening exercise  -           User Key  (r) = Recorded By, (t) = Taken By, (c) = Cosigned By    Initials Name Provider Type     Brandie Lazaro Occupational Therapist               Clinical Impression     Row Name 08/11/22 1615          Pain Assessment    Pretreatment Pain Rating 0/10 - no pain  -     Posttreatment Pain Rating 0/10 - no pain  -     Pre/Posttreatment Pain Comment says her legs are sore and hurt when \"mashed\"  -     Pain Intervention(s) Repositioned;Ambulation/increased activity  -AH     Row Name 08/11/22 1615          Plan of Care Review    Plan of Care Reviewed With patient  -     Progress no change  -     Outcome " Evaluation Pt seen for OT evaluation today.  Pt presents with weakness and decreased independence with self care and functional mobility tasks. Pt able to sit eob with min assist, stood with min assist and walked 2' to head of bed with min assist using RW.  Pt is expected to benefit from skilled OT to improve her strength, endurance and independence with ADL tasks.  -[facility] Name 08/11/22 1615          Therapy Assessment/Plan (OT)    Patient/Family Therapy Goal Statement (OT) d/c home  -     Rehab Potential (OT) good, to achieve stated therapy goals  -     Criteria for Skilled Therapeutic Interventions Met (OT) yes;skilled treatment is necessary  -     Therapy Frequency (OT) 3 times/wk  -[facility] Name 08/11/22 1615          Therapy Plan Review/Discharge Plan (OT)    Anticipated Discharge Disposition (OT) inpatient rehabilitation facility;home with home health;home with 24/7 care  -[facility] Name 08/11/22 1615          Vital Signs    Pre SpO2 (%) 94  -AH     O2 Delivery Pre Treatment room air  -AH     Intra SpO2 (%) 88  -AH     O2 Delivery Intra Treatment room air  -AH     Post SpO2 (%) 92  -AH     O2 Delivery Post Treatment room air  -[facility] Name 08/11/22 1615          Positioning and Restraints    Pre-Treatment Position in bed  -     Post Treatment Position bed  -     In Bed fowlers;call light within reach;encouraged to call for assist;exit alarm on  -           User Key  (r) = Recorded By, (t) = Taken By, (c) = Cosigned By    Initials Name Provider Type     Brandie Lazaro Occupational Therapist               Outcome Measures     [facility] Name 08/11/22 1621          How much help from another is currently needed...    Putting on and taking off regular lower body clothing? 2  -AH     Bathing (including washing, rinsing, and drying) 2  -AH     Toileting (which includes using toilet bed pan or urinal) 2  -AH     Putting on and taking off regular upper body clothing 3  -AH     Taking care of personal  grooming (such as brushing teeth) 3  -     Eating meals 3  -     AM-PAC 6 Clicks Score (OT) 15  -AH     Row Name 08/11/22 1342          How much help from another person do you currently need...    Turning from your back to your side while in flat bed without using bedrails? 3  -MS     Moving from lying on back to sitting on the side of a flat bed without bedrails? 3  -MS     Moving to and from a bed to a chair (including a wheelchair)? 3  -MS     Standing up from a chair using your arms (e.g., wheelchair, bedside chair)? 3  -MS     Climbing 3-5 steps with a railing? 2  -MS     To walk in hospital room? 2  -MS     AM-PAC 6 Clicks Score (PT) 16  -MS     Highest level of mobility 5 --> Static standing  -MS     Row Name 08/11/22 1621 08/11/22 1342       Functional Assessment    Outcome Measure Options AM-PAC 6 Clicks Daily Activity (OT)  - AM-PAC 6 Clicks Basic Mobility (PT)  -MS          User Key  (r) = Recorded By, (t) = Taken By, (c) = Cosigned By    Initials Name Provider Type     Brandie Lazaro Occupational Therapist    Jorden Guevara, CARLITO Physical Therapist                Occupational Therapy Education                 Title: PT OT SLP Therapies (In Progress)     Topic: Occupational Therapy (In Progress)     Point: ADL training (Done)     Description:   Instruct learner(s) on proper safety adaptation and remediation techniques during self care or transfers.   Instruct in proper use of assistive devices.              Learning Progress Summary           Patient Acceptance, E,TB, VU by  at 8/11/2022 1621    Comment: Role of OT/POC                   Point: Home exercise program (Not Started)     Description:   Instruct learner(s) on appropriate technique for monitoring, assisting and/or progressing therapeutic exercises/activities.              Learner Progress:  Not documented in this visit.          Point: Precautions (Not Started)     Description:   Instruct learner(s) on prescribed precautions during  self-care and functional transfers.              Learner Progress:  Not documented in this visit.          Point: Body mechanics (Not Started)     Description:   Instruct learner(s) on proper positioning and spine alignment during self-care, functional mobility activities and/or exercises.              Learner Progress:  Not documented in this visit.                      User Key     Initials Effective Dates Name Provider Type Discipline     06/16/21 -  Brandie Lazaro Occupational Therapist OT              OT Recommendation and Plan  Planned Therapy Interventions (OT): activity tolerance training, BADL retraining, patient/caregiver education/training, transfer/mobility retraining, strengthening exercise  Therapy Frequency (OT): 3 times/wk  Plan of Care Review  Plan of Care Reviewed With: patient  Progress: no change  Outcome Evaluation: Pt seen for OT evaluation today.  Pt presents with weakness and decreased independence with self care and functional mobility tasks. Pt able to sit eob with min assist, stood with min assist and walked 2' to head of bed with min assist using RW.  Pt is expected to benefit from skilled OT to improve her strength, endurance and independence with ADL tasks.     Time Calculation:    Time Calculation- OT     Row Name 08/11/22 1622             Time Calculation- OT    OT Start Time 1004  -AH      OT Received On 08/11/22  -      OT Goal Re-Cert Due Date 08/21/22  -              Untimed Charges    OT Eval/Re-eval Minutes 39  -AH              Total Minutes    Untimed Charges Total Minutes 39  -AH       Total Minutes 39  -AH            User Key  (r) = Recorded By, (t) = Taken By, (c) = Cosigned By    Initials Name Provider Type     Brandie Lazaro Occupational Therapist              Therapy Charges for Today     Code Description Service Date Service Provider Modifiers Qty    [id number]  OT EVAL LOW COMPLEXITY 3 8/11/2022 Brandie Lazaro GO 1               Brandie Lazaro  8/11/2022

## 2022-08-11 NOTE — CASE MANAGEMENT/SOCIAL WORK
Discharge Planning Assessment  [facility]     Patient Name: Noa Sutton  MRN: [id number]  Today's Date: 8/11/2022    Admit Date: 8/10/2022     Discharge Needs Assessment     Row Name 08/11/22 1518       Living Environment    People in Home alone    Current Living Arrangements home    Potentially Unsafe Housing Conditions unable to assess    Primary Care Provided by child(maire)    Provides Primary Care For no one, unable/limited ability to care for self    Family Caregiver if Needed child(marie), adult    Family Caregiver Names Isaias Sutton    Able to Return to Prior Arrangements yes       Transition Planning    Transportation Anticipated family or friend will provide       Discharge Needs Assessment    Readmission Within the Last 30 Days previous discharge plan unsuccessful    Equipment Currently Used at Home rollator    Equipment Needed After Discharge none    Provided Post Acute Provider List? N/A               Discharge Plan     Row Name 08/11/22 0080       Plan    Plan SW spoke to pt's son in room.  Pt uses a rollator at home. Son checks on pt often. At this time son states he is waiting for Dr. Blake to call him back to discuss options for discharge.  At this time son is open to the option of pt going to [facility]. States he hasnt made up his mind.    Patient/Family in Agreement with Plan yes    Provided Post Acute Provider List? N/A    Provided Post Acute Provider Quality & Resource List? N/A              Continued Care and Services - Admitted Since 8/10/2022    Coordination has not been started for this encounter.       Expected Discharge Date and Time     Expected Discharge Date Expected Discharge Time    Aug 13, 2022          Demographic Summary     Row Name 08/11/22 1515       General Information    Admission Type inpatient    Arrived From home    Referral Source admission list    Reason for Consult discharge planning    Preferred Language English       Contact Information    Permission Granted to Share  Info With     Contact Information Obtained for                Functional Status     Row Name 08/11/22 1516       Functional Status    Usual Activity Tolerance moderate    Current Activity Tolerance fair       Employment/    Employment Status retired               Psychosocial     Row Name 08/11/22 1516       Values/Beliefs    Spiritual, Cultural Beliefs, [employer] Practices, Values that Affect Care no       Coping/Stress    Major Change/Loss/Stressor none    Patient Personal Strengths strong support system    Sources of Support adult child(marie)       Developmental Stage (Eriksson's)    Developmental Stage Stage 8 (65 years-death/Late Adulthood) Integrity vs. Despair       C-SSRS (Recent)    Q1 Wished to be Dead (Past Month) no    Q2 Suicidal Thoughts (Past Month) no    Q6 Suicide Behavior (Lifetime) no       Violence Risk    Feels Like Hurting Others no    Previous Attempt to Harm Others no               Abuse/Neglect    No documentation.                Legal    No documentation.                Substance Abuse    No documentation.                Patient Forms    No documentation.                   PRANAV GUILLEN

## 2022-08-11 NOTE — THERAPY EVALUATION
Patient Name: Noa Sutton  : 10/7/1926    MRN: [id number]                              Today's Date: 2022       Admit Date: 8/10/2022    Visit Dx:     ICD-10-CM ICD-9-CM   1. Hyponatremia  E87.1 276.1   2. Hypervolemia, unspecified hypervolemia type  E87.70 276.69   3. 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)   • Salzmann's nodular degeneration   • Hyponatremia   • Atrial fibrillation (HCC)     Past Medical History:   Diagnosis Date   • Constipation    • Disease of thyroid gland    • Hypertension      Past Surgical History:   Procedure Laterality Date   • CHOLECYSTECTOMY     • THYROID SURGERY        General Information     Row Name 22 1254          Physical Therapy Time and Intention    Document Type evaluation  -MS     Mode of Treatment physical therapy  -MS     Row Name 22 1254          General Information    Patient Profile Reviewed yes  -MS     Prior Level of Function independent:  -MS     Row Name 22 1254          Living Environment    People in Home alone  son has been living with patient since she's been sick  -MS     Row Name 22 1254          Home Main Entrance    Number of Stairs, Main Entrance none  -MS     Row Name 22 1254          Cognition    Orientation Status (Cognition) oriented x 4;verbal cues/prompts needed for orientation  -MS     Row Name 22 1254          Safety Issues, Functional Mobility    Safety Issues Affecting Function (Mobility) safety precautions follow-through/compliance;insight into  deficits/self-awareness;positioning of assistive device;sequencing abilities;safety precaution awareness  -MS     Impairments Affecting Function (Mobility) balance;strength;motor planning;range of motion (ROM);endurance/activity tolerance  -MS           User Key  (r) = Recorded By, (t) = Taken By, (c) = Cosigned By    Initials Name Provider Type    MS Jorden Rausch, PT Physical Therapist               Mobility     Row Name 08/11/22 1335          Bed Mobility    Bed Mobility supine-sit;sit-supine  -MS     Supine-Sit [place] (Bed Mobility) minimum assist (75% patient effort)  -MS     Sit-Supine [place] (Bed Mobility) minimum assist (75% patient effort)  -MS     Assistive Device (Bed Mobility) bed rails;draw sheet;head of bed elevated  -MS     Row Name 08/11/22 1335          Sit-Stand Transfer    Sit-Stand [place] (Transfers) minimum assist (75% patient effort)  -MS     Assistive Device (Sit-Stand Transfers) walker, front-wheeled  -MS     Row Name 08/11/22 1335          Gait/Stairs (Locomotion)    [place] Level (Gait) minimum assist (75% patient effort)  -MS     Assistive Device (Gait) walker, front-wheeled  -MS     Distance in Feet (Gait) 2  -MS     Deviations/Abnormal Patterns (Gait) base of support, narrow;anthony decreased  -MS     Bilateral Gait Deviations forward flexed posture  -MS           User Key  (r) = Recorded By, (t) = Taken By, (c) = Cosigned By    Initials Name Provider Type    MS Jorden Rausch, PT Physical Therapist               Obj/Interventions     Row Name 08/11/22 1337          Range of Motion Comprehensive    General Range of Motion bilateral lower extremity ROM WNL  -MS     Row Name 08/11/22 1337          Strength Comprehensive (MMT)    General Manual Muscle Testing (MMT) Assessment lower extremity strength deficits identified  -MS     Comment, General Manual Muscle Testing (MMT) Assessment B LE 3+/5  -MS     Row Name 08/11/22 1337          Balance    Balance Assessment  sit to stand dynamic balance;standing dynamic balance  -MS     Sit to Stand Dynamic Balance minimal assist  -MS     Dynamic Standing Balance minimal assist  -MS     Position/Device Used, Standing Balance walker, front-wheeled  -MS           User Key  (r) = Recorded By, (t) = Taken By, (c) = Cosigned By    Initials Name Provider Type    Jorden Guevara, PT Physical Therapist               Goals/Plan     Row Name 08/11/22 1341          Bed Mobility Goal 1 (PT)    Activity/Assistive Device (Bed Mobility Goal 1, PT) bed mobility activities, all  -MS     [place] Level/Cues Needed (Bed Mobility Goal 1, PT) standby assist  -MS     Time Frame (Bed Mobility Goal 1, PT) long term goal (LTG);10 days  -MS     Row Name 08/11/22 1341          Transfer Goal 1 (PT)    Activity/Assistive Device (Transfer Goal 1, PT) transfers, all;sit-to-stand/stand-to-sit  -MS     [place] Level/Cues Needed (Transfer Goal 1, PT) contact guard required  -MS     Time Frame (Transfer Goal 1, PT) long term goal (LTG);10 days  -MS     Row Name 08/11/22 1341          Gait Training Goal 1 (PT)    Activity/Assistive Device (Gait Training Goal 1, PT) gait (walking locomotion);assistive device use  -MS     [place] Level (Gait Training Goal 1, PT) contact guard required  -MS     Distance (Gait Training Goal 1, PT) 75 ft  -MS     Time Frame (Gait Training Goal 1, PT) long term goal (LTG);10 days  -MS     Row Name 08/11/22 1341          Patient Education Goal (PT)    Activity (Patient Education Goal, PT) ther exer x15 reps  -MS     [place]/Cues/Accuracy (Memory Goal 2, PT) demonstrates adequately  -MS     Time Frame (Patient Education Goal, PT) long term goal (LTG);10 days  -MS     Row Name 08/11/22 1341          Therapy Assessment/Plan (PT)    Planned Therapy Interventions (PT) bed mobility training;balance training;gait training;home exercise program;ROM (range of motion);stair training;strengthening;transfer training;patient/family  "education  -MS           User Key  (r) = Recorded By, (t) = Taken By, (c) = Cosigned By    Initials Name Provider Type    Jorden Guevara, PT Physical Therapist               Clinical Impression     Row Name 08/11/22 1338          Pain    Pretreatment Pain Rating 0/10 - no pain  -MS     Posttreatment Pain Rating 0/10 - no pain  -MS     Pre/Posttreatment Pain Comment Pt reported no pain but that her R LE will hurt when \"mashed\"  -MS     Row Name 08/11/22 1338          Plan of Care Review    Plan of Care Reviewed With patient  -MS     Progress no change  -MS     Outcome Evaluation Pt participated in PT eval this date. Pt required Davis to transfer to EOB. At EOB Pt required Davis to transfer to standing. Pt was able to take side steps with Davis but was very weak. Pt is expected to benefit from skilled PTx during this inpatient stay.  -MS     Row Name 08/11/22 1338          Therapy Assessment/Plan (PT)    Rehab Potential (PT) good, to achieve stated therapy goals  -MS     Criteria for Skilled Interventions Met (PT) yes;meets criteria  -MS     Therapy Frequency (PT) 3 times/wk  -MS     Row Name 08/11/22 1338          Vital Signs    Pre SpO2 (%) 94  -MS     O2 Delivery Pre Treatment room air  -MS     Intra SpO2 (%) 88  -MS     O2 Delivery Intra Treatment room air  -MS     Post SpO2 (%) 92  -MS     O2 Delivery Post Treatment room air  -MS     Pre Patient Position Supine  -MS     Intra Patient Position Standing  -MS     Post Patient Position Supine  -MS     Row Name 08/11/22 1338          Positioning and Restraints    Pre-Treatment Position in bed  -MS     Post Treatment Position bed  -MS     In Bed fowlers;encouraged to call for assist;exit alarm on;call light within reach  -MS           User Key  (r) = Recorded By, (t) = Taken By, (c) = Cosigned By    Initials Name Provider Type    Jorden Guevara, PT Physical Therapist               Outcome Measures     Row Name 08/11/22 1342          How much help from another " person do you currently need...    Turning from your back to your side while in flat bed without using bedrails? 3  -MS     Moving from lying on back to sitting on the side of a flat bed without bedrails? 3  -MS     Moving to and from a bed to a chair (including a wheelchair)? 3  -MS     Standing up from a chair using your arms (e.g., wheelchair, bedside chair)? 3  -MS     Climbing 3-5 steps with a railing? 2  -MS     To walk in hospital room? 2  -MS     AM-PAC 6 Clicks Score (PT) 16  -MS     Highest level of mobility 5 --> Static standing  -MS     Row Name 08/11/22 1342          Functional Assessment    Outcome Measure Options AM-PAC 6 Clicks Basic Mobility (PT)  -MS           User Key  (r) = Recorded By, (t) = Taken By, (c) = Cosigned By    Initials Name Provider Type    MS Jorden Rausch PT Physical Therapist                             Physical Therapy Education                 Title: PT OT SLP Therapies (In Progress)     Topic: Physical Therapy (In Progress)     Point: Mobility training (Done)     Learning Progress Summary           Patient Acceptance, E, VU by MS at 8/11/2022 1343    Comment: importance of mobility                   Point: Home exercise program (Done)     Learning Progress Summary           Patient Acceptance, E, VU by MS at 8/11/2022 1343    Comment: importance of mobility                   Point: Body mechanics (Not Started)     Learner Progress:  Not documented in this visit.          Point: Precautions (Not Started)     Learner Progress:  Not documented in this visit.                      User Key     Initials Effective Dates Name Provider Type Discipline    MS 07/01/22 -  Jorden Rausch PT Physical Therapist PT              PT Recommendation and Plan  Planned Therapy Interventions (PT): bed mobility training, balance training, gait training, home exercise program, ROM (range of motion), stair training, strengthening, transfer training, patient/family education  Plan of Care Reviewed  With: patient  Progress: no change  Outcome Evaluation: Pt participated in PT eval this date. Pt required Davis to transfer to EOB. At EOB Pt required Davis to transfer to standing. Pt was able to take side steps with aDvis but was very weak. Pt is expected to benefit from skilled PTx during this inpatient stay.     Time Calculation:    PT Charges     Row Name 08/11/22 1343             Time Calculation    Start Time 1001  -MS      PT Received On 08/11/22  -MS      PT Goal Re-Cert Due Date 08/21/22  -MS              Untimed Charges    PT Eval/Re-eval Minutes 40  -MS              Total Minutes    Untimed Charges Total Minutes 40  -MS       Total Minutes 40  -MS            User Key  (r) = Recorded By, (t) = Taken By, (c) = Cosigned By    Initials Name Provider Type    Jorden Guevara PT Physical Therapist              Therapy Charges for Today     Code Description Service Date Service Provider Modifiers Qty    88600367196 HC PT EVAL LOW COMPLEXITY 3 8/11/2022 Jorden Rausch PT GP 1          PT G-Codes  Outcome Measure Options: AM-PAC 6 Clicks Basic Mobility (PT)  AM-PAC 6 Clicks Score (PT): 16    Jorden Rausch PT  8/11/2022

## 2022-08-11 NOTE — PLAN OF CARE
Goal Outcome Evaluation:  Plan of Care Reviewed With: patient   No acute events this shift.  No reports of pain.

## 2022-08-11 NOTE — CONSULTS
[facility]      Nephrology Consultation      Referring Provider:   Ganesh Sims, *    Reason for Consultation:  Hyponatremia.    Subjective:  Chief complaint   Chief Complaint   Patient presents with   • Altered Mental Status     History of present illness:    Patient is 89-year-old  female who was recently discharged from the hospital on July 25, 2022 secondary to hyponatremia, she responded well to the diuretics and was discharged home on 20 mg of Lasix every other day with fairly stable serum sodium.  She went to see the primary care Dr. Blake.  He stopped the diuretic and over the next 3 days patient continuously getting fluid overloaded and worsening serum sodium was noted after she came for evaluation to the ER.  It appears that patient has not been able to take care of herself and has been thinking of trying to transfer her to assisted living but she does not have any meaningful interaction or able to take care of herself likely will end up requiring long-term nursing home care.  On initial presentation she was noted to have a sodium of 114 and was admitted, she was noted to be in CHF was given 40 mg of IV Lasix and I was called and started her on normal saline at 30 cc an hour with no significant improvement late yesterday evening was given another 20 of Lasix and increasing IV fluids to 50 cc an hour.  This morning it is starting to improve slowly.  I have reviewed labs/imaging/records from this hospitalization, including ER staff and admitting/attending physicians H/P's and progress notes to establish a comprehensive understanding of this patient's clinical hospital course, as well as to establish plan of care appropriately.   Past Medical History:   Diagnosis Date   • Constipation    • Disease of thyroid gland    • Hypertension        Past Surgical History:   Procedure Laterality Date   • CHOLECYSTECTOMY     • THYROID SURGERY       Family History   Problem Relation  Jenny Kenney is a 67year Age of Onset   • Arthritis Mother    • Stroke Mother    • Heart attack Other    • Cancer Other        Social History     Tobacco Use   • Smoking status: Never Smoker   • Smokeless tobacco: Never Used   Substance Use Topics   • Alcohol use: No   • Drug use: Never     Home medications:   Prior to Admission Medications     Prescriptions Last Dose Informant Patient Reported? Taking?    alendronate (FOSAMAX) 70 MG tablet 8/8/2022  No Yes    TAKE 1 TABLET EVERY 7 DAYS    Calcium Carbonate-Vitamin D (CALTRATE 600+D PO) 8/10/2022  Yes Yes    Take 1 tablet by mouth Daily.    Cholecalciferol (VITAMIN D3 PO) 8/10/2022  Yes Yes    Take 50 mcg by mouth Daily.    levothyroxine (SYNTHROID, LEVOTHROID) 75 MCG tablet 8/10/2022  No Yes    Take 1 tablet by mouth Daily.    lisinopril (PRINIVIL,ZESTRIL) 10 MG tablet 8/10/2022  No Yes    Take 1 tablet by mouth Daily for 30 days.    metoprolol succinate XL (TOPROL-XL) 100 MG 24 hr tablet 8/10/2022  No Yes    Take 1 tablet by mouth Daily for 30 days.    omeprazole (priLOSEC) 20 MG capsule 8/10/2022  No Yes    TAKE 1 CAPSULE DAILY    Polyethyl Glycol-Propyl Glycol (SYSTANE OP) 8/9/2022  Yes Yes    Apply  to eye(s) as directed by provider.    furosemide (LASIX) 20 MG tablet   No No    Take 1 tablet by mouth Every Other Day for 30 days.        Emergency department medications:   Medications   sodium chloride 0.9 % flush 10 mL (has no administration in time range)   sodium chloride 0.9 % flush 10 mL (10 mL Intravenous Given 8/10/22 2134)   acetaminophen (TYLENOL) tablet 650 mg (has no administration in time range)   ondansetron (ZOFRAN) tablet 4 mg (has no administration in time range)     Or   ondansetron (ZOFRAN) injection 4 mg (has no administration in time range)   sennosides-docusate (PERICOLACE) 8.6-50 MG per tablet 2 tablet (2 tablets Oral Given 8/10/22 2134)     And   polyethylene glycol (MIRALAX) packet 17 g (has no administration in time range)     And   bisacodyl (DULCOLAX) EC  DAY Disp: 90 tablet Rfl: 0   letrozole 2.5 MG Oral Tab Take 1 tablet (2.5 mg total) by mouth daily. Disp: 90 tablet Rfl: 3   triamcinolone acetonide 0.1 % External Ointment Apply 1 Application topically nightly.  Disp: 30 g Rfl: 2   CETIRIZINE 10 MG Oral Ta "tablet 5 mg (has no administration in time range)     And   bisacodyl (DULCOLAX) suppository 10 mg (has no administration in time range)   sodium chloride 0.9 % infusion (50 mL/hr Intravenous Rate/Dose Change 8/11/22 0008)   levothyroxine (SYNTHROID, LEVOTHROID) tablet 75 mcg (has no administration in time range)   lisinopril (PRINIVIL,ZESTRIL) tablet 10 mg (has no administration in time range)   metoprolol succinate XL (TOPROL-XL) 24 hr tablet 100 mg (has no administration in time range)   pantoprazole (PROTONIX) EC tablet 40 mg (40 mg Oral Given 8/11/22 0619)   furosemide (LASIX) injection 40 mg (40 mg Intravenous Given 8/10/22 1315)   furosemide (LASIX) injection 20 mg (20 mg Intravenous Given 8/11/22 0008)       Allergies:  Patient has no known allergies.    Review of Systems  Patient is demented and the review of system noted, except as mentioned in the history of present illness and assessment and plan.    Objective:  Vital Signs  /89 (BP Location: Left arm, Patient Position: Lying)   Pulse 78   Temp 97.3 °F (36.3 °C) (Axillary)   Resp 16   Ht 160 cm (63\")   Wt 72.1 kg (158 lb 15.2 oz)   SpO2 90%   BMI 28.16 kg/m²          No intake/output data recorded.    Intake/Output Summary (Last 24 hours) at 8/11/2022 0809  Last data filed at 8/11/2022 0422  Gross per 24 hour   Intake --   Output 1000 ml   Net -1000 ml       Physical Exam:  General Appearance:   Alert, cooperative, in no acute distress.     Head:   Normocephalic, without obvious abnormality, atraumatic.     Eyes:      Normal, conjunctivae and sclerae, no icterus, no pallor, corneas clear, PERRLA        Throat:   Oral mucosa dry      Neck:  No adenopathy, supple, trachea midline, no thyromegaly, no carotid bruit, no JVD        Lungs:    Clear to auscultation and fair air movement noted, with occasional basal crackles.      Heart::   Irregular rhythm and rate.       Abdomen:   Obese. Normal bowel sounds, no masses, no organomegaly, soft " (postoperative nausea and vomiting)    • Pregnancy ,        • Presbyopia    • Seasonal allergies    • Sleep apnea 2011    CPAP   • Umbilical hernia 4336   • Uterine cancer ([facility] [address] 2008    stage 1A - RONN-BSO   • Varicella zoster       Past Anup history, multiple      Social History    Socioeconomic History      Marital status:       Spouse name: Not on file      Number of children: 2      Years of education: Not on file      Highest education level: Not on file    Occupational History non-tender, non-distended, no guarding, no rebound tenderness      Genital urinary:   No urinary bladder palpable      Extremities:  Moves all extremities, 1+ edema, no cyanosis, no redness.     Pulses:  Pulses palpable and equal bilaterally but weak.     Skin:  No bleeding, bruising or rash        Neurologic:  Cranial nerves grossly intact, move all extremities         Results Review:   Results from last 7 days   Lab Units 08/11/22  0610 08/10/22  2344 08/10/22  1749 08/10/22  1150   SODIUM mmol/L 117* 115* 114* 114*   POTASSIUM mmol/L 3.9 4.0 4.4 4.6   CHLORIDE mmol/L 77* 75* 74* 74*   CO2 mmol/L 33.4* 30.1* 28.9 28.8   BUN mg/dL 10 9 10 11   CREATININE mg/dL 0.47* 0.51* 0.49* 0.50*   CALCIUM mg/dL 8.0* 8.6 8.8 9.0   ALBUMIN g/dL  --   --   --  3.90   BILIRUBIN mg/dL  --   --   --  1.7*   ALK PHOS U/L  --   --   --  108   ALT (SGPT) U/L  --   --   --  34*   AST (SGOT) U/L  --   --   --  34*   GLUCOSE mg/dL 95 99 108* 120*     Estimated Creatinine Clearance: 68.2 mL/min (A) (by C-G formula based on SCr of 0.47 mg/dL (L)).          Results from last 7 days   Lab Units 08/11/22  0610 08/10/22  1150   WBC 10*3/mm3 8.44 10.27   HEMOGLOBIN g/dL 11.4* 12.2   PLATELETS 10*3/mm3 281 382         Brief Urine Lab Results  (Last result in the past 365 days)      Color   Clarity   Blood   Leuk Est   Nitrite   Protein   CREAT   Urine HCG        08/10/22 1201 Yellow   Clear   Negative   Negative   Negative   Trace               No results found for: UTPCR  Imaging Results (Last 24 Hours)     Procedure Component Value Units Date/Time    CT Head Without Contrast [[id number]] Collected: 08/10/22 1321     Updated: 08/10/22 1324    Narrative:      HEAD CT     8/10/2022 1:08 PM      HISTORY:   AMS X 3 days; E87.6-Eirm-imnbkloapr and hyponatremia; E87.70-Fluid  overload, unspecified; G93.40-Encephalopathy, unspecified     TECHNIQUE:   Multiple axial CT images were performed from the foramen magnum to the  vertex. Coronal reformatted  images were reconstructed from axial data  set. This study was performed with techniques to keep radiation doses as  low as reasonably achievable (ALARA). Individualized dose reduction  techniques using automated exposure control or adjustment of mA and/or  kV according to the patient size were employed. 729.69 mGy.cm     COMPARISON:   CT head 07/25/2022.     FINDINGS:   No acute intracranial hemorrhage or large acute cortical infarct.  Chronic small vessel ischemic white matter changes and generalized  cerebral volume loss are present. Ventricles are prominent. No midline  shift. The basal cisterns are patent. No significant interval change.  Intracranial arterial atherosclerotic calcifications. Plate and screw  fixation of the mandible.     No skull fracture. The visualized paranasal sinuses and mastoid air  cells are clear.       Impression:      No acute intracranial hemorrhage or large acute cortical infarct.  Chronic microvascular ischemic white matter changes. Global cerebral  volume loss. No significant interval change.        CRITICAL RESULT:  No.     COMMUNICATION:  Per this written report.     Images personally reviewed, interpreted, and dictated by VIOLET Hobson.                             This report was signed and finalized on 8/10/2022 1:22 PM by VIOLET Hobson.    XR Chest 1 View [[id number]] Collected: 08/10/22 1233     Updated: 08/10/22 1237    Narrative:      CLINICAL INDICATION:    lower extremity edema, recently taken off Lasix     EXAMINATION TECHNIQUE:   XR CHEST 1 VW-     COMPARISON:  Radiograph 07/25/2022.     FINDINGS:  Cardiomegaly. Bilateral perihilar vascular engorgement and crowding.  Bilateral diffuse linear and reticular opacities, radiating from the  vamis and extending peripherally. Ill-defined bibasilar opacities. No  pneumothorax. Probable trace effusions. Surgical clips in the lower  neck. No acute osseous or soft tissue abnormality.       Impression:       Cardiomegaly. Bilateral diffuse pulmonary opacities, most likely edema  and atelectasis, secondary to congestive heart failure. Superimposed  airspace disease cannot be excluded.           Images personally reviewed, interpreted and dictated by VIOLET Hobson.                This report was signed and finalized on 8/10/2022 12:35 PM by VIOLET Hobson.        levothyroxine, 75 mcg, Oral, Daily  lisinopril, 10 mg, Oral, Q24H  metoprolol succinate XL, 100 mg, Oral, Q24H  pantoprazole, 40 mg, Oral, QAM  senna-docusate sodium, 2 tablet, Oral, BID  sodium chloride, 10 mL, Intravenous, Q12H      sodium chloride, 50 mL/hr, Last Rate: 50 mL/hr (08/11/22 0008)        Assessment/Plan:    1. Hyponatremia: Rule out SIADH, does appear she was likely volume depleted continue with normal saline and will give low-dose diuretic and see if we can get her stabilized.  Work-up as ordered.  2. New onset atrial fibrillation: Heart rate is much more stable this morning.  3. Generalized weakness s/p fall: We will benefit from physical therapy and may even short-term rehab.  4. Hypothyroidism: Recently noted to have increased TSH, will recheck it.  5. Hypertension: Blood pressure appears to be under fair control with current medications.    Risk and complexity: High    Plan:  · It does appear patient was doing fairly well with the 5-day ago BMP showing serum sodium of 140 at that time she had a follow-up with Dr. Blake and he stopped the diuretics and it appears that patient presented with increased confusion and noted to have low sodium again.  Work-up as ordered, I will go ahead and continue with normal saline at 50 cc an hour and give frequent low-dose diuretics.  Her initial serum sodium was 114 this morning it is 117 with initially at 30 cc an hour of normal saline and then 50 cc normal saline with a 40 of Lasix initially given in the ER every 20 given overnight by me.  I will go ahead and start her on Lasix 20 mg every  6 hours x3 doses today.  Continue with 50 cc/h normal saline.  We will try to get the sodium in the range of 120 that will be fairly appropriate appropriate in a 24-hour period.  After that we can raise it up to 8 points every 24 hours.  She is on lisinopril 10 mg a day I will go ahead and decrease the dose to 2.5 mg as her blood pressure will likely fall with diuretics.  · Continue with rest of the current treatment plan and surveillance labs.  · Details were discussed with the patient as well as family in the room.    · Details were also discussed with the hospitalist service and Dr. Blake who suggested the patient needs to go to a nursing home, it does appear family also wants her in assisted living which may not be the best thing she likely will need rehab and nursing home placement.  · Further recommendations will depend on clinical course of the patient during the current hospitalization.    · I also discussed the details with the nursing staff.  · Rest as ordered.    In closing, I sincerely appreciate opportunity to participate in care of this patient. If I can be of any further assistance with the management of this patient, please don’t hesitate to contact me.    Ed Antonio MD    08/11/22  08:09 EDT    Dictated using Dragon.

## 2022-08-11 NOTE — CONSULTS
"Adult Nutrition  Assessment/PES    Patient Name:  Noa Sutton  YOB: 1926  MRN: [id number]  Admit Date:  8/10/2022    Assessment Date:  8/11/2022    Comments:      Recommend:    1. Continue current diet as medically appropriate and tolerated.   2. Establish and encourage PO intake as appropriate.   3. RD ordered Magic Cup TID.  4. Consider MVI with minerals daily.   5. Consider vitamin D supplement as appropriate d/t hx of deficiency.  6. Continue to monitor and replace electrolytes PRN.    RD to follow pt and available PRN.      Reason for Assessment     Row Name 08/11/22 0955          Reason for Assessment    Reason For Assessment per organizational policy;nurse/nurse practitioner consult;identified at risk by screening criteria     Diagnosis cardiac disease;nutrition related history;other (see comments);neurologic conditions  dementia, HTN, vitamin D deficiency     Identified At Risk by Screening Criteria MST SCORE 2+;reduced oral intake over the last month                  Anthropometrics     Row Name 08/11/22 0957 08/11/22 0500       Anthropometrics    Weight -- 72.1 kg (158 lb 15.2 oz)    Age for Calculations 89 --    Height for Calculation 1.6 m (5' 3\") --    Weight for Calculation 67.1 kg (148 lb)  est dry bw --               Labs/Tests/Procedures/Meds     Row Name 08/11/22 0956          Labs/Procedures/Meds    Lab Results Reviewed reviewed, pertinent     Lab Results Comments high: total bili, ALT low: Na+, Cl-, Cr            Medications    Pertinent Medications Reviewed reviewed, pertinent     Pertinent Medications Comments lasix, protonix, pericolace, NaCl                Physical Findings     Row Name 08/11/22 0957          Physical Findings    Overall Physical Appearance overweight, 2+ generalized edema, 2+ dependent edema, 2+ edema BLE                Estimated/Assessed Needs - Anthropometrics     Row Name 08/11/22 0957 08/11/22 0500       Anthropometrics    Weight -- 72.1 kg (158 " "lb 15.2 oz)    Age for Calculations 89 --    Height for Calculation 1.6 m (5' 3\") --    Weight for Calculation 67.1 kg (148 lb)  est dry bw --       Estimated/Assessed Needs    Additional Documentation Fluid Requirements (Group);Linn-St. Jeor Equation (Group);Estimated Calorie Needs (Group);KCAL/KG (Group);Protein Requirements (Group) --       Estimated Calorie Needs    Estimated Calorie Requirement (kcal/day) 1678  25 kcal/kg --    Estimated Calorie Need Method kcal/kg --       KCAL/KG    KCAL/KG 30 Kcal/Kg (kcal);25 Kcal/Kg (kcal) --    25 Kcal/Kg (kcal) 1678.3 --    30 Kcal/Kg (kcal) 2013.96 --       Linn-St. Jeor Equation    RMR (Linn-St. Jeor Equation) 1035.445 --    Linn-St. Jeor Activity Factors 1.4 - 1.5 --    Activity Factors (Linn-St. Jeor) 1449.623 - 1553.1675 --       Protein Requirements    Weight Used For Protein Calculations 67.1 kg (148 lb)  est dry bw --    Est Protein Requirement Amount (gms/kg) 1.0 gm protein  67 --    Estimated Protein Requirements (gms/day) 67.13 --       Fluid Requirements    Fluid Requirements (mL/day) 1678 --    Estimated Fluid Requirement Method other (see comments)  1 mL/kcal --    RDA Method (mL) 1678 --               Nutrition Prescription Ordered     Row Name 08/11/22 0958          Nutrition Prescription PO    Current PO Diet Regular     Common Modifiers Cardiac                Evaluation of Received Nutrient/Fluid Intake     Row Name 08/11/22 0959          PO Evaluation    Number of Days PO Intake Evaluated Insufficient Data                     Problem/Interventions:   Problem 1     Row Name 08/11/22 0959          Nutrition Diagnoses Problem 1    Problem 1 Predicted Suboptimal Intake     Etiology (related to) Medical Diagnosis     Neurological AMS;Dementia     Signs/Symptoms (evidenced by) Report of Mnimal PO Intake                      Intervention Goal     Row Name 08/11/22 0959          Intervention Goal    General Maintain nutrition;Disease " management/therapy;Meet nutritional needs for age/condition;Reduce/improve symptoms     PO Meet estimated needs;Establish PO;Tolerate PO;Initiate feeding     Weight Maintain weight                Nutrition Intervention     Row Name 08/11/22 0959          Nutrition Intervention    RD/Tech Action Follow Tx progress;Care plan reviewd;Encourage intake;Recommend/ordered     Recommended/Ordered Supplement                Nutrition Prescription     Row Name 08/11/22 1000          Nutrition Prescription PO    PO Prescription Begin/change supplement;Other (comment)  Continue current diet as medically appropriate and tolerated     Supplement Magic Cup     Supplement Frequency 3 times a day     Common Modifiers Cardiac     New PO Prescription Ordered? No, recommended            Other Orders    Obtain Weight Daily     Obtain Weight Ordered? No, recommended     Supplement Vitamin mineral supplement     Supplement Ordered? No, recommended     Other Continue to monitor and replace electrolytes PRN                Education/Evaluation     Row Name 08/11/22 1000          Education    Education Education not appropriate at this time     Please explain Patient confusion            Monitor/Evaluation    Monitor Per protocol;I&O;PO intake;Supplement intake;Pertinent labs;Weight;Symptoms                 Electronically signed by:  Aaliyah Lewis RD  08/11/22 10:01 EDT

## 2022-08-11 NOTE — DISCHARGE PLACEMENT REQUEST
"Noa Sutton (89 y.o. Female)             Date of Birth   10/07/1926    Social Security Number       Address   [address] [place] [address]    Home Phone   [phone]    MRN   [id number]       Amish   None    Marital Status                               Admission Date   8/10/22    Admission Type   Emergency    Admitting Provider   Kerley, Brian Joseph, DO    Attending Provider   Kerley, Brian Joseph, DO    Department, Room/Bed   [facility] MED SURG  3, 312/1       Discharge Date       Discharge Disposition       Discharge Destination                               Attending Provider: Kerley, Brian Joseph, DO    Allergies: No Known Allergies    Isolation: None   Infection: None   Code Status: No CPR   Advance Care Planning Activity    Ht: 160 cm (63\")   Wt: 72.1 kg (158 lb 15.2 oz)    Admission Cmt: None   Principal Problem: None                Active Insurance as of 8/10/2022     Primary Coverage     Payor Plan Insurance Group Employer/Plan Group    [facility] MEDICARE REPLACEMENT [facility] MEDICARE REPLACEMENT [id number]     Payor Plan Address Payor Plan Phone Number Payor Plan Fax Number Effective Dates    [address] [address]   2016 - None Entered    [facility] [id number]       Subscriber Name Subscriber Birth Date Member ID       NOA SUTTON 10/7/1926 [id number]                 Emergency Contacts      (Rel.) Home Phone Work Phone Mobile Phone    Isaias Sutton (Son) [phone] -- --               History & Physical      Soraya Reyes APRN at 08/10/22 1402            [facility] HOSPITALIST   HISTORY AND PHYSICAL      Name:  Noa Sutton   Age:  89 y.o.  Sex:  female  :  10/7/1926  MRN:  [id number]   Visit Number:  [id number]  Admission Date:  8/10/2022  Date Of Service:  08/10/22  Primary Care Physician:  Matt Blake MD    Chief Complaint:     Weakness, Swelling, Confusion    History Of Presenting " Illness:      Patient is a 89 year old female who presents to the emergency department today with son who complains of altered mental status for the past 3 days.  Patient's son noted patient does have some baseline confusion and dementia which has progressively worsened in the past 3 days.  States that she has been mumbling, staring off, and had increased generalized weakness.  Son denies any recent sick contacts or sickness, no cough, no fevers, no nausea, vomiting, or diarrhea.  Patient states she just feels weak and she hasn't been eating.  Son states she has plenty of food and she just hasn't eaten as much.  Patient with recent admission (7/25/2022) for hyponatremia and A.Fib with RVR after a mechanical fall at home.   Nephrology and Cardiology were consulted with previous admission.  Patient was discharged home on Lasix 20mg every other day and transitioned to Metoprolol XL after gentle diuresis and sodium stabilizing.  Patient followed up with her PCP (Hayden) who increased her Metoprolol and stopped her Lasix.  Son wanted short term rehab during last hospitalization but patient did not qualify.  Patient went home with caregivers coming to her home.  Son states that after last admission there were not around the clock caregivers present and that he had been staying with his mother off and on.  Advises he had been looking at private pay assisted living, although patient would not currently qualify for assisted living.  On exam, patient has received Lasix 40mg IV and has had 1L of UOP noted via purewick external catheter.    Work up in the emergency department noted proBNP-2821, sodium-114, chloride-74, creatinine-0.50, and CBC grossly unremarkable.  CXR was done and noted cardiomegaly, bilateral diffuse pulmonary opacities, most likely edema and atelectasis, secondary to congestive heart failure, superimposed airspace disease cannot be excluded.  Urinalysis was without any acute infection.  CT of head was  obtained and without any acute process.  Hospitalist was consulted for admission for hyponatremia.    Review Of Systems:    All systems were reviewed and negative except as mentioned in history of presenting illness, assessment and plan.    Past Medical History: Patient  has a past medical history of Constipation, Disease of thyroid gland, and Hypertension.    Past Surgical History: Patient  has a past surgical history that includes Cholecystectomy and Thyroid surgery.    Social History: Patient  reports that she has never smoked. She has never used smokeless tobacco. She reports that she does not drink alcohol.    Family History: Patient's family history includes Arthritis in her mother; Cancer in an other family member; Heart attack in an other family member; Stroke in her mother.    Allergies:      Patient has no known allergies.    Home Medications:    Prior to Admission Medications     Prescriptions Last Dose Informant Patient Reported? Taking?    alendronate (FOSAMAX) 70 MG tablet   No No    TAKE 1 TABLET EVERY 7 DAYS    Calcium Carbonate-Vitamin D (CALTRATE 600+D PO)   Yes No    Take 1 tablet by mouth Daily.    Cholecalciferol (VITAMIN D3 PO)   Yes No    Take 50 mcg by mouth Daily.    furosemide (LASIX) 20 MG tablet   No No    Take 1 tablet by mouth Every Other Day for 30 days.    levothyroxine (SYNTHROID, LEVOTHROID) 75 MCG tablet   No No    Take 1 tablet by mouth Daily.    lisinopril (PRINIVIL,ZESTRIL) 10 MG tablet   No No    Take 1 tablet by mouth Daily for 30 days.    metoprolol succinate XL (TOPROL-XL) 100 MG 24 hr tablet   No No    Take 1 tablet by mouth Daily for 30 days.    omeprazole (priLOSEC) 20 MG capsule   No No    TAKE 1 CAPSULE DAILY    Polyethyl Glycol-Propyl Glycol (SYSTANE OP)   Yes No    Apply  to eye(s) as directed by provider.        ED Medications:    Medications   sodium chloride 0.9 % flush 10 mL (has no administration in time range)   furosemide (LASIX) injection 40 mg (40 mg  "Intravenous Given 8/10/22 1315)     Vital Signs:  Temp:  [97.9 °F (36.6 °C)] 97.9 °F (36.6 °C)  Heart Rate:  [] 94  Resp:  [22] 22  BP: (132-173)/() 166/108        08/10/22  1144   Weight: 66.7 kg (147 lb)     Body mass index is 26.04 kg/m².    Physical Exam:     Most recent vital Signs: BP (!) 166/108   Pulse 94   Temp 97.9 °F (36.6 °C) (Oral)   Resp 22   Ht 160 cm (63\")   Wt 66.7 kg (147 lb)   SpO2 92%   BMI 26.04 kg/m²     Physical Exam  Vitals and nursing note reviewed.   Constitutional:       General: She is not in acute distress.     Appearance: She is normal weight. She is ill-appearing. She is not toxic-appearing.   HENT:      Head: Normocephalic and atraumatic.      Mouth/Throat:      Mouth: Mucous membranes are moist.   Eyes:      Pupils: Pupils are equal, round, and reactive to light.   Cardiovascular:      Rate and Rhythm: Normal rate. Rhythm irregular.      Pulses: Normal pulses.      Heart sounds: Normal heart sounds.   Pulmonary:      Effort: Pulmonary effort is normal.      Breath sounds: Normal breath sounds.      Comments: Crackles to bases  Abdominal:      General: Bowel sounds are normal.      Palpations: Abdomen is soft.   Musculoskeletal:         General: Swelling (right lower extremity) and tenderness (right lower extremity s/p fall a couple weeks ago) present. Normal range of motion.      Right lower leg: Edema (with weeping) present.      Left lower leg: Edema present.      Comments: Ecchymosis to right lower extremity after recent fall   Skin:     General: Skin is warm and dry.   Neurological:      General: No focal deficit present.      Mental Status: She is alert. She is disoriented.      Motor: Weakness (generalized) present.      Comments: Baseline dementia--oriented to self, does not know year, month, and tells me that she is at [facility].   Psychiatric:         Mood and Affect: Mood normal.         Behavior: Behavior normal.         Laboratory data:    I have " reviewed the labs done in the emergency room.    Results from last 7 days   Lab Units 08/10/22  1150 08/05/22  1212   SODIUM mmol/L 114* 140   POTASSIUM mmol/L 4.6 4.6   CHLORIDE mmol/L 74* 100   TOTAL CO2 mmol/L  --  27.7   CO2 mmol/L 28.8  --    BUN mg/dL 11 18   CREATININE mg/dL 0.50* 1.30*   CALCIUM mg/dL 9.0 10.2*   BILIRUBIN mg/dL 1.7*  --    ALK PHOS U/L 108  --    ALT (SGPT) U/L 34*  --    AST (SGOT) U/L 34*  --    GLUCOSE mg/dL 120* 152*     Results from last 7 days   Lab Units 08/10/22  1150   WBC 10*3/mm3 10.27   HEMOGLOBIN g/dL 12.2   HEMATOCRIT % 34.6   PLATELETS 10*3/mm3 382         Results from last 7 days   Lab Units 08/10/22  1150   TROPONIN T ng/mL <0.010     Results from last 7 days   Lab Units 08/10/22  1150   PROBNP pg/mL 2,821.0*                 Results from last 7 days   Lab Units 08/10/22  1201   COLOR UA  Yellow   GLUCOSE UA  Negative   KETONES UA  Negative   LEUKOCYTES UA  Negative   PH, URINE  7.0   BILIRUBIN UA  Negative   UROBILINOGEN UA  1.0 E.U./dL       Pain Management Panel    There is no flowsheet data to display.       Radiology:    CT Head Without Contrast    Result Date: 8/10/2022  HEAD CT     8/10/2022 1:08 PM  HISTORY: AMS X 3 days; E87.6-Iois-gmhusnooff and hyponatremia; E87.70-Fluid overload, unspecified; G93.40-Encephalopathy, unspecified  TECHNIQUE: Multiple axial CT images were performed from the foramen magnum to the vertex. Coronal reformatted images were reconstructed from axial data set. This study was performed with techniques to keep radiation doses as low as reasonably achievable (ALARA). Individualized dose reduction techniques using automated exposure control or adjustment of mA and/or kV according to the patient size were employed. 729.69 mGy.cm  COMPARISON: CT head 07/25/2022.  FINDINGS: No acute intracranial hemorrhage or large acute cortical infarct. Chronic small vessel ischemic white matter changes and generalized cerebral volume loss are present. Ventricles  are prominent. No midline shift. The basal cisterns are patent. No significant interval change. Intracranial arterial atherosclerotic calcifications. Plate and screw fixation of the mandible.  No skull fracture. The visualized paranasal sinuses and mastoid air cells are clear.      No acute intracranial hemorrhage or large acute cortical infarct. Chronic microvascular ischemic white matter changes. Global cerebral volume loss. No significant interval change.   CRITICAL RESULT: No.  COMMUNICATION: Per this written report.  Images personally reviewed, interpreted, and dictated by VIOLET Hobson.          This report was signed and finalized on 8/10/2022 1:22 PM by VIOLET Hobson.    XR Chest 1 View    Result Date: 8/10/2022  CLINICAL INDICATION:  lower extremity edema, recently taken off Lasix  EXAMINATION TECHNIQUE: XR CHEST 1 VW-  COMPARISON: Radiograph 07/25/2022.  FINDINGS: Cardiomegaly. Bilateral perihilar vascular engorgement and crowding. Bilateral diffuse linear and reticular opacities, radiating from the vamsi and extending peripherally. Ill-defined bibasilar opacities. No pneumothorax. Probable trace effusions. Surgical clips in the lower neck. No acute osseous or soft tissue abnormality.      Cardiomegaly. Bilateral diffuse pulmonary opacities, most likely edema and atelectasis, secondary to congestive heart failure. Superimposed airspace disease cannot be excluded.    Images personally reviewed, interpreted and dictated by VIOLET Hobson.       This report was signed and finalized on 8/10/2022 12:35 PM by VIOLET Hobson.      Assessment:    Hyponatremia, POA  Hypervolemia, POA  Right Leg Swelling s/p recent fall  Acute Encephalopathy, POA likely secondary to hyponatremia  Hypertension  Permanent Atrial Fibrillation not on anticoagulation  Hypothyroidism      Plan:    Hyponatremia/ Hypervolemia/ Acute Encephalopathy worse than baselin  -Admit patient to Med/Surg with telemetry  monitoring  -Consult Dr. Antonio for hyponatremia; appreciate recommendations  -IV fluids with normal saline 30ml/hr  -Given 40mg Lasix IV in the ED  -Further diuresis after nephrology has consulted  -Strict I&Os  -Falls precautions  -BMPs every 6 hours  -Monitor patient mentation, likely due to hyponatremia in the setting of baseline dementia or could just be progressing dementia    Right leg swelling s/p recent fall  -Swelling and ecchymosis present   -Calf pain on palpation  -Will order doppler of right lower extremity to rule out DVT with recent trauma    Chronic--Afib/ Hypertension/ Hypothyroidism/ Debility  -Home medications as reconciled  -Consult PT/OT  -Consult Case Management for discharge planning, patient may need placement as she still lives alone.    Patient is a DNR/DNI on admission    Risk Assessment: Moderate due to age and co-morbidities  DVT Prophylaxis: SCDs  Code Status: DNR/DNI  Diet: Regular; Cardiac    Advance Care Planning   ACP discussion was held with the patient during this visit. Patient has an advance directive (not in EMR), copy requested.         FARTUN Corona  08/10/22  14:02 EDT    Dictated utilizing Dragon dictation.    Electronically signed by Soraya Reyes APRN at 08/10/22 1535          Physician Progress Notes (last 48 hours)      Soraya Reyes APRN at 22 0835              [facility]    Name:  Noa Sutton   Age:  89 y.o.  Sex:  female  :  10/7/1926  MRN:  [id number]   Visit Number:  [id number]  Admission Date:  8/10/2022  Date Of Service:  22  Primary Care Physician:  Matt Blake MD     LOS: 1 day :    Chief Complaint:      Weakness and increased confusion    Subjective:    Patient seen and examined this morning.  Patient is found resting in bed on exam.  There is no family present at bedside.  She denies any pain or other problems on exam.  States she is feeling ok.  Vital signs are  stable.  Nephrology is consulting due to low sodium.  She tells me we are at the apartment, and the year is 2022, unsure of month.    Hospital Course:    Patient is a 89 year old female who presents to the emergency department today with son who complains of altered mental status for the past 3 days.  Patient's son noted patient does have some baseline confusion and dementia which has progressively worsened in the past 3 days.  States that she has been mumbling, staring off, and had increased generalized weakness.  Son denies any recent sick contacts or sickness, no cough, no fevers, no nausea, vomiting, or diarrhea.  Patient states she just feels weak and she hasn't been eating.  Son states she has plenty of food and she just hasn't eaten as much.  Patient with recent admission (7/25/2022) for hyponatremia and A.Fib with RVR after a mechanical fall at home.   Nephrology and Cardiology were consulted with previous admission.  Patient was discharged home on Lasix 20mg every other day and transitioned to Metoprolol XL after gentle diuresis and sodium stabilizing.  Patient followed up with her PCP (Hayden) who increased her Metoprolol and stopped her Lasix.  Son wanted short term rehab during last hospitalization but patient did not qualify.  Patient went home with caregivers coming to her home.  Son states that after last admission there were not around the clock caregivers present and that he had been staying with his mother off and on.  Advises he had been looking at private pay assisted living, although patient would not currently qualify for assisted living.  On exam, patient has received Lasix 40mg IV and has had 1L of UOP noted via purewick external catheter.     Work up in the emergency department noted proBNP-2821, sodium-114, chloride-74, creatinine-0.50, and CBC grossly unremarkable.  CXR was done and noted cardiomegaly, bilateral diffuse pulmonary opacities, most likely edema and atelectasis, secondary to  congestive heart failure, superimposed airspace disease cannot be excluded.  Urinalysis was without any acute infection.  CT of head was obtained and without any acute process.    Review of Systems:     All systems were reviewed and negative except as mentioned in subjective, assessment and plan.    Vital Signs:    Temp:  [96 °F (35.6 °C)-97.9 °F (36.6 °C)] 97.3 °F (36.3 °C)  Heart Rate:  [] 78  Resp:  [16-22] 16  BP: (129-173)/() 138/89    Intake and output:    I/O last 3 completed shifts:  In: -   Out: 1000 [Urine:1000]  I/O this shift:  In: 480 [P.O.:480]  Out: -     Physical Examination:    General Appearance:  Alert and cooperative, pleasant elderly female, no acute distress on exam, appears chronically ill   Head:  Atraumatic and normocephalic.   Eyes: Conjunctivae and sclerae normal, no icterus. No pallor.   Throat: No oral lesions, no thrush, oral mucosa moist.   Neck: Supple, trachea midline   Lungs:   Breath sounds heard bilaterally equally.  Crackles to bases   Heart:  Normal S1 and S2, irregular, no murmur, no gallop, no rub. No JVD.   Abdomen:   Normal bowel sounds, no masses, no organomegaly. Soft, nontender, nondistended, no rebound tenderness.   Extremities: Supple, bilateral lower extremity edema R>L with weeping to right leg, no cyanosis, no clubbing.   Skin: No bleeding or rash.   Neurologic: Alert and disoriented at baseline due to advanced dementia. No facial asymmetry. Moves all four limbs. Generalized weakness is present.     Laboratory results:    Results from last 7 days   Lab Units 08/11/22  0610 08/10/22  2344 08/10/22  1749 08/10/22  1150   SODIUM mmol/L 117* 115* 114* 114*   POTASSIUM mmol/L 3.9 4.0 4.4 4.6   CHLORIDE mmol/L 77* 75* 74* 74*   CO2 mmol/L 33.4* 30.1* 28.9 28.8   BUN mg/dL 10 9 10 11   CREATININE mg/dL 0.47* 0.51* 0.49* 0.50*   CALCIUM mg/dL 8.0* 8.6 8.8 9.0   BILIRUBIN mg/dL  --   --   --  1.7*   ALK PHOS U/L  --   --   --  108   ALT (SGPT) U/L  --   --   --   34*   AST (SGOT) U/L  --   --   --  34*   GLUCOSE mg/dL 95 99 108* 120*     Results from last 7 days   Lab Units 08/11/22  0610 08/10/22  1150   WBC 10*3/mm3 8.44 10.27   HEMOGLOBIN g/dL 11.4* 12.2   HEMATOCRIT % 31.8* 34.6   PLATELETS 10*3/mm3 281 382         Results from last 7 days   Lab Units 08/10/22  1150   TROPONIN T ng/mL <0.010             I have reviewed the patient's laboratory results.    Radiology results:    CT Head Without Contrast    Result Date: 8/10/2022  HEAD CT     8/10/2022 1:08 PM  HISTORY: AMS X 3 days; E87.6-Kizy-ixqqgjghqm and hyponatremia; E87.70-Fluid overload, unspecified; G93.40-Encephalopathy, unspecified  TECHNIQUE: Multiple axial CT images were performed from the foramen magnum to the vertex. Coronal reformatted images were reconstructed from axial data set. This study was performed with techniques to keep radiation doses as low as reasonably achievable (ALARA). Individualized dose reduction techniques using automated exposure control or adjustment of mA and/or kV according to the patient size were employed. 729.69 mGy.cm  COMPARISON: CT head 07/25/2022.  FINDINGS: No acute intracranial hemorrhage or large acute cortical infarct. Chronic small vessel ischemic white matter changes and generalized cerebral volume loss are present. Ventricles are prominent. No midline shift. The basal cisterns are patent. No significant interval change. Intracranial arterial atherosclerotic calcifications. Plate and screw fixation of the mandible.  No skull fracture. The visualized paranasal sinuses and mastoid air cells are clear.      Impression: No acute intracranial hemorrhage or large acute cortical infarct. Chronic microvascular ischemic white matter changes. Global cerebral volume loss. No significant interval change.   CRITICAL RESULT: No.  COMMUNICATION: Per this written report.  Images personally reviewed, interpreted, and dictated by VIOLET Hobson.          This report was signed and  finalized on 8/10/2022 1:22 PM by VIOLET Hobson.    XR Chest 1 View    Result Date: 8/10/2022  CLINICAL INDICATION:  lower extremity edema, recently taken off Lasix  EXAMINATION TECHNIQUE: XR CHEST 1 VW-  COMPARISON: Radiograph 07/25/2022.  FINDINGS: Cardiomegaly. Bilateral perihilar vascular engorgement and crowding. Bilateral diffuse linear and reticular opacities, radiating from the vamsi and extending peripherally. Ill-defined bibasilar opacities. No pneumothorax. Probable trace effusions. Surgical clips in the lower neck. No acute osseous or soft tissue abnormality.      Impression: Cardiomegaly. Bilateral diffuse pulmonary opacities, most likely edema and atelectasis, secondary to congestive heart failure. Superimposed airspace disease cannot be excluded.    Images personally reviewed, interpreted and dictated by VIOLET Hobson.       This report was signed and finalized on 8/10/2022 12:35 PM by VIOLET Hobson.    I have reviewed the patient's radiology reports.    Medication Review:     I have reviewed the patient's active and prn medications.     Problem List:      Hyponatremia      Assessment:    Hyponatremia, POA  Hypervolemia, POA  Right Leg Swelling s/p recent fall  Acute Encephalopathy, POA likely secondary to hyponatremia  Hypertension  Permanent Atrial Fibrillation not on anticoagulation  Hypothyroidism    Plan:    Hyponatremia/ Hypervolemia/ Acute Encephalopathy worse than baseline  -Dr. Antonio (nephrology) consulted for hyponatremia; appreciate recommendations  -IV fluids with normal saline 50 ml/hr  -Given 40mg Lasix IV in the ED  -Further diuresis per nephrology--Marisela planning to continue with diuresis today  -Strict I&Os  -Falls precautions  -Continue BMPs every 6 hours  -Monitor patient mentation, likely due to hyponatremia in the setting of baseline dementia or could just be progressing dementia  -Sodium at 117 this morning     Right leg swelling s/p recent  fall  -Swelling and ecchymosis present   -Calf pain on palpation  -Doppler pending for today     Chronic--Afib/ Hypertension/ Hypothyroidism/ Debility  -Home medications as reconciled  -Consult PT/OT  -Case Management consulted for discharge planning, patient may need placement as she still lives alone.    DVT Prophylaxis: SCDs  Code Status: DNR/DNI  Diet: Regular/ Cardiac  Discharge Plan: 2-3 days    FARTUN Corona  22  10:18 EDT    Dictated utilizing Dragon dictation.      Electronically signed by Soraya Reyes APRN at 22 1039          Physical Therapy Notes (last 48 hours)      Joredn Rausch, PT at 22 1343  Version 1 of 1       Goal Outcome Evaluation:  Plan of Care Reviewed With: patient        Progress: no change  Outcome Evaluation: Pt participated in PT eval this date. Pt required Davis to transfer to EOB. At EOB Pt required Davis to transfer to standing. Pt was able to take side steps with Davis but was very weak. Pt is expected to benefit from skilled PTx during this inpatient stay.    Electronically signed by Jorden Rausch, PT at 22 1343     Jorden Rausch, PT at 22 1342  Version 1 of 1         Patient Name: Noa Sutton  : 10/7/1926    MRN: [id number]                              Today's Date: 2022       Admit Date: 8/10/2022    Visit Dx:     ICD-10-CM ICD-9-CM   1. Hyponatremia  E87.1 276.1   2. Hypervolemia, unspecified hypervolemia type  E87.70 276.69   3. 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)   •  Salzmann's nodular degeneration   • Hyponatremia   • Atrial fibrillation (HCC)     Past Medical History:   Diagnosis Date   • Constipation    • Disease of thyroid gland    • Hypertension      Past Surgical History:   Procedure Laterality Date   • CHOLECYSTECTOMY     • THYROID SURGERY        General Information     [facility] Name 08/11/22 1254          Physical Therapy Time and Intention    Document Type evaluation  -MS     Mode of Treatment physical therapy  -MS     Row Name 08/11/22 1254          General Information    Patient Profile Reviewed yes  -MS     Prior Level of Function independent:  -MS     Row Name 08/11/22 1254          Living Environment    People in Home alone  son has been living with patient since she's been sick  -MS     Row Name 08/11/22 1254          Home Main Entrance    Number of Stairs, Main Entrance none  -MS     Row Name 08/11/22 1254          Cognition    Orientation Status (Cognition) oriented x 4;verbal cues/prompts needed for orientation  -MS     [facility] Name 08/11/22 1254          Safety Issues, Functional Mobility    Safety Issues Affecting Function (Mobility) safety precautions follow-through/compliance;insight into deficits/self-awareness;positioning of assistive device;sequencing abilities;safety precaution awareness  -MS     Impairments Affecting Function (Mobility) balance;strength;motor planning;range of motion (ROM);endurance/activity tolerance  -MS           User Key  (r) = Recorded By, (t) = Taken By, (c) = Cosigned By    Initials Name Provider Type    MS Jorden Rausch, PT Physical Therapist               Mobility     Row Name 08/11/22 6637          Bed Mobility    Bed Mobility supine-sit;sit-supine  -MS     Supine-Sit [place] (Bed Mobility) minimum assist (75% patient effort)  -MS     Sit-Supine [place] (Bed Mobility) minimum assist (75% patient effort)  -MS     Assistive Device (Bed Mobility) bed rails;draw sheet;head of bed elevated  -MS     Row Name 08/11/22 6724           Sit-Stand Transfer    Sit-Stand [place] (Transfers) minimum assist (75% patient effort)  -MS     Assistive Device (Sit-Stand Transfers) walker, front-wheeled  -MS     Row Name 08/11/22 1335          Gait/Stairs (Locomotion)    [place] Level (Gait) minimum assist (75% patient effort)  -MS     Assistive Device (Gait) walker, front-wheeled  -MS     Distance in Feet (Gait) 2  -MS     Deviations/Abnormal Patterns (Gait) base of support, narrow;anthony decreased  -MS     Bilateral Gait Deviations forward flexed posture  -MS           User Key  (r) = Recorded By, (t) = Taken By, (c) = Cosigned By    Initials Name Provider Type    Jorden Guevara, CARLITO Physical Therapist               Obj/Interventions     Row Name 08/11/22 1337          Range of Motion Comprehensive    General Range of Motion bilateral lower extremity ROM WNL  -MS     Row Name 08/11/22 1337          Strength Comprehensive (MMT)    General Manual Muscle Testing (MMT) Assessment lower extremity strength deficits identified  -MS     Comment, General Manual Muscle Testing (MMT) Assessment B LE 3+/5  -MS     Row Name 08/11/22 1337          Balance    Balance Assessment sit to stand dynamic balance;standing dynamic balance  -MS     Sit to Stand Dynamic Balance minimal assist  -MS     Dynamic Standing Balance minimal assist  -MS     Position/Device Used, Standing Balance walker, front-wheeled  -MS           User Key  (r) = Recorded By, (t) = Taken By, (c) = Cosigned By    Initials Name Provider Type    Jorden Guevara, PT Physical Therapist               Goals/Plan     Row Name 08/11/22 1341          Bed Mobility Goal 1 (PT)    Activity/Assistive Device (Bed Mobility Goal 1, PT) bed mobility activities, all  -MS     [place] Level/Cues Needed (Bed Mobility Goal 1, PT) standby assist  -MS     Time Frame (Bed Mobility Goal 1, PT) long term goal (LTG);10 days  -MS     Row Name 08/11/22 1341          Transfer Goal 1 (PT)    Activity/Assistive  "Device (Transfer Goal 1, PT) transfers, all;sit-to-stand/stand-to-sit  -MS     [place] Level/Cues Needed (Transfer Goal 1, PT) contact guard required  -MS     Time Frame (Transfer Goal 1, PT) long term goal (LTG);10 days  -MS     Row Name 08/11/22 1341          Gait Training Goal 1 (PT)    Activity/Assistive Device (Gait Training Goal 1, PT) gait (walking locomotion);assistive device use  -MS     [place] Level (Gait Training Goal 1, PT) contact guard required  -MS     Distance (Gait Training Goal 1, PT) 75 ft  -MS     Time Frame (Gait Training Goal 1, PT) long term goal (LTG);10 days  -MS     Row Name 08/11/22 1341          Patient Education Goal (PT)    Activity (Patient Education Goal, PT) ther exer x15 reps  -MS     [place]/Cues/Accuracy (Memory Goal 2, PT) demonstrates adequately  -MS     Time Frame (Patient Education Goal, PT) long term goal (LTG);10 days  -MS     Row Name 08/11/22 1341          Therapy Assessment/Plan (PT)    Planned Therapy Interventions (PT) bed mobility training;balance training;gait training;home exercise program;ROM (range of motion);stair training;strengthening;transfer training;patient/family education  -MS           User Key  (r) = Recorded By, (t) = Taken By, (c) = Cosigned By    Initials Name Provider Type    Jorden Guevara, PT Physical Therapist               Clinical Impression     Row Name 08/11/22 9383          Pain    Pretreatment Pain Rating 0/10 - no pain  -MS     Posttreatment Pain Rating 0/10 - no pain  -MS     Pre/Posttreatment Pain Comment Pt reported no pain but that her R LE will hurt when \"mashed\"  -MS     Row Name 08/11/22 2650          Plan of Care Review    Plan of Care Reviewed With patient  -MS     Progress no change  -MS     Outcome Evaluation Pt participated in PT eval this date. Pt required Davis to transfer to EOB. At EOB Pt required Davis to transfer to standing. Pt was able to take side steps with Davis but was very weak. Pt is expected to " benefit from skilled PTx during this inpatient stay.  -MS     Row Name 08/11/22 1338          Therapy Assessment/Plan (PT)    Rehab Potential (PT) good, to achieve stated therapy goals  -MS     Criteria for Skilled Interventions Met (PT) yes;meets criteria  -MS     Therapy Frequency (PT) 3 times/wk  -MS     Row Name 08/11/22 1338          Vital Signs    Pre SpO2 (%) 94  -MS     O2 Delivery Pre Treatment room air  -MS     Intra SpO2 (%) 88  -MS     O2 Delivery Intra Treatment room air  -MS     Post SpO2 (%) 92  -MS     O2 Delivery Post Treatment room air  -MS     Pre Patient Position Supine  -MS     Intra Patient Position Standing  -MS     Post Patient Position Supine  -MS     Row Name 08/11/22 1338          Positioning and Restraints    Pre-Treatment Position in bed  -MS     Post Treatment Position bed  -MS     In Bed fowlers;encouraged to call for assist;exit alarm on;call light within reach  -MS           User Key  (r) = Recorded By, (t) = Taken By, (c) = Cosigned By    Initials Name Provider Type    Jorden Guevara, PT Physical Therapist               Outcome Measures     Row Name 08/11/22 1342          How much help from another person do you currently need...    Turning from your back to your side while in flat bed without using bedrails? 3  -MS     Moving from lying on back to sitting on the side of a flat bed without bedrails? 3  -MS     Moving to and from a bed to a chair (including a wheelchair)? 3  -MS     Standing up from a chair using your arms (e.g., wheelchair, bedside chair)? 3  -MS     Climbing 3-5 steps with a railing? 2  -MS     To walk in hospital room? 2  -MS     AM-PAC 6 Clicks Score (PT) 16  -MS     Highest level of mobility 5 --> Static standing  -MS     Row Name 08/11/22 1342          Functional Assessment    Outcome Measure Options AM-PAC 6 Clicks Basic Mobility (PT)  -MS           User Key  (r) = Recorded By, (t) = Taken By, (c) = Cosigned By    Initials Name Provider Type    MS  Jorden Rausch, CARLITO Physical Therapist                             Physical Therapy Education                 Title: PT OT SLP Therapies (In Progress)     Topic: Physical Therapy (In Progress)     Point: Mobility training (Done)     Learning Progress Summary           Patient Acceptance, E, VU by MS at 8/11/2022 1343    Comment: importance of mobility                   Point: Home exercise program (Done)     Learning Progress Summary           Patient Acceptance, E, VU by MS at 8/11/2022 1343    Comment: importance of mobility                   Point: Body mechanics (Not Started)     Learner Progress:  Not documented in this visit.          Point: Precautions (Not Started)     Learner Progress:  Not documented in this visit.                      User Key     Initials Effective Dates Name Provider Type Discipline    MS 07/01/22 -  Jorden Rausch PT Physical Therapist PT              PT Recommendation and Plan  Planned Therapy Interventions (PT): bed mobility training, balance training, gait training, home exercise program, ROM (range of motion), stair training, strengthening, transfer training, patient/family education  Plan of Care Reviewed With: patient  Progress: no change  Outcome Evaluation: Pt participated in PT eval this date. Pt required Davis to transfer to EOB. At EOB Pt required Davis to transfer to standing. Pt was able to take side steps with Davis but was very weak. Pt is expected to benefit from skilled PTx during this inpatient stay.     Time Calculation:    PT Charges     Row Name 08/11/22 1343             Time Calculation    Start Time 1001  -MS      PT Received On 08/11/22  -MS      PT Goal Re-Cert Due Date 08/21/22  -MS              Untimed Charges    PT Eval/Re-eval Minutes 40  -MS              Total Minutes    Untimed Charges Total Minutes 40  -MS       Total Minutes 40  -MS            User Key  (r) = Recorded By, (t) = Taken By, (c) = Cosigned By    Initials Name Provider Type    MS Shalom  Jorden, PT Physical Therapist              Therapy Charges for Today     Code Description Service Date Service Provider Modifiers Qty    08394486085 HC PT EVAL LOW COMPLEXITY 3 8/11/2022 Jorden Rausch, PT GP 1          PT G-Codes  Outcome Measure Options: AM-PAC 6 Clicks Basic Mobility (PT)  AM-PAC 6 Clicks Score (PT): 16    Jorden Raucsh PT  8/11/2022      Electronically signed by Jorden Rausch, PT at 08/11/22 7452

## 2022-08-12 LAB
ANION GAP SERPL CALCULATED.3IONS-SCNC: 10 MMOL/L (ref 5–15)
ANION GAP SERPL CALCULATED.3IONS-SCNC: 7.7 MMOL/L (ref 5–15)
ANION GAP SERPL CALCULATED.3IONS-SCNC: 8.3 MMOL/L (ref 5–15)
ANION GAP SERPL CALCULATED.3IONS-SCNC: 8.7 MMOL/L (ref 5–15)
ANION GAP SERPL CALCULATED.3IONS-SCNC: 8.8 MMOL/L (ref 5–15)
ANION GAP SERPL CALCULATED.3IONS-SCNC: 9.8 MMOL/L (ref 5–15)
BUN SERPL-MCNC: 13 MG/DL (ref 8–23)
BUN/CREAT SERPL: 24.1 (ref 7–25)
BUN/CREAT SERPL: 24.1 (ref 7–25)
BUN/CREAT SERPL: 24.5 (ref 7–25)
BUN/CREAT SERPL: 25 (ref 7–25)
BUN/CREAT SERPL: 25 (ref 7–25)
BUN/CREAT SERPL: 25.5 (ref 7–25)
CALCIUM SPEC-SCNC: 8.2 MG/DL (ref 8.2–9.6)
CALCIUM SPEC-SCNC: 8.3 MG/DL (ref 8.2–9.6)
CALCIUM SPEC-SCNC: 8.3 MG/DL (ref 8.2–9.6)
CALCIUM SPEC-SCNC: 8.4 MG/DL (ref 8.2–9.6)
CALCIUM SPEC-SCNC: 8.5 MG/DL (ref 8.2–9.6)
CALCIUM SPEC-SCNC: 8.6 MG/DL (ref 8.2–9.6)
CHLORIDE SERPL-SCNC: 75 MMOL/L (ref 98–107)
CHLORIDE SERPL-SCNC: 76 MMOL/L (ref 98–107)
CO2 SERPL-SCNC: 31.2 MMOL/L (ref 22–29)
CO2 SERPL-SCNC: 31.7 MMOL/L (ref 22–29)
CO2 SERPL-SCNC: 32.3 MMOL/L (ref 22–29)
CO2 SERPL-SCNC: 33.2 MMOL/L (ref 22–29)
CO2 SERPL-SCNC: 35 MMOL/L (ref 22–29)
CO2 SERPL-SCNC: 35.3 MMOL/L (ref 22–29)
CREAT SERPL-MCNC: 0.51 MG/DL (ref 0.57–1)
CREAT SERPL-MCNC: 0.52 MG/DL (ref 0.57–1)
CREAT SERPL-MCNC: 0.52 MG/DL (ref 0.57–1)
CREAT SERPL-MCNC: 0.53 MG/DL (ref 0.57–1)
CREAT SERPL-MCNC: 0.54 MG/DL (ref 0.57–1)
CREAT SERPL-MCNC: 0.54 MG/DL (ref 0.57–1)
DEPRECATED RDW RBC AUTO: 40.1 FL (ref 37–54)
EGFRCR SERPLBLD CKD-EPI 2021: 84.9 ML/MIN/1.73
EGFRCR SERPLBLD CKD-EPI 2021: 84.9 ML/MIN/1.73
EGFRCR SERPLBLD CKD-EPI 2021: 85.3 ML/MIN/1.73
EGFRCR SERPLBLD CKD-EPI 2021: 85.7 ML/MIN/1.73
EGFRCR SERPLBLD CKD-EPI 2021: 85.7 ML/MIN/1.73
EGFRCR SERPLBLD CKD-EPI 2021: 86.1 ML/MIN/1.73
ERYTHROCYTE [DISTWIDTH] IN BLOOD BY AUTOMATED COUNT: 12.8 % (ref 12.3–15.4)
GLUCOSE SERPL-MCNC: 101 MG/DL (ref 65–99)
GLUCOSE SERPL-MCNC: 102 MG/DL (ref 65–99)
GLUCOSE SERPL-MCNC: 112 MG/DL (ref 65–99)
GLUCOSE SERPL-MCNC: 116 MG/DL (ref 65–99)
GLUCOSE SERPL-MCNC: 95 MG/DL (ref 65–99)
GLUCOSE SERPL-MCNC: 97 MG/DL (ref 65–99)
HCT VFR BLD AUTO: 34.8 % (ref 34–46.6)
HGB BLD-MCNC: 12.4 G/DL (ref 12–15.9)
MCH RBC QN AUTO: 30.7 PG (ref 26.6–33)
MCHC RBC AUTO-ENTMCNC: 35.6 G/DL (ref 31.5–35.7)
MCV RBC AUTO: 86.1 FL (ref 79–97)
PLATELET # BLD AUTO: 249 10*3/MM3 (ref 140–450)
PMV BLD AUTO: 9.5 FL (ref 6–12)
POTASSIUM SERPL-SCNC: 3.4 MMOL/L (ref 3.5–5.2)
POTASSIUM SERPL-SCNC: 3.5 MMOL/L (ref 3.5–5.2)
POTASSIUM SERPL-SCNC: 3.7 MMOL/L (ref 3.5–5.2)
POTASSIUM SERPL-SCNC: 3.7 MMOL/L (ref 3.5–5.2)
POTASSIUM SERPL-SCNC: 3.8 MMOL/L (ref 3.5–5.2)
POTASSIUM SERPL-SCNC: 4.1 MMOL/L (ref 3.5–5.2)
RBC # BLD AUTO: 4.04 10*6/MM3 (ref 3.77–5.28)
SODIUM SERPL-SCNC: 115 MMOL/L (ref 136–145)
SODIUM SERPL-SCNC: 115 MMOL/L (ref 136–145)
SODIUM SERPL-SCNC: 116 MMOL/L (ref 136–145)
SODIUM SERPL-SCNC: 118 MMOL/L (ref 136–145)
SODIUM SERPL-SCNC: 118 MMOL/L (ref 136–145)
SODIUM SERPL-SCNC: 121 MMOL/L (ref 136–145)
WBC NRBC COR # BLD: 10.55 10*3/MM3 (ref 3.4–10.8)

## 2022-08-12 PROCEDURE — 99232 SBSQ HOSP IP/OBS MODERATE 35: CPT | Performed by: NURSE PRACTITIONER

## 2022-08-12 PROCEDURE — 80048 BASIC METABOLIC PNL TOTAL CA: CPT | Performed by: INTERNAL MEDICINE

## 2022-08-12 PROCEDURE — 97535 SELF CARE MNGMENT TRAINING: CPT

## 2022-08-12 PROCEDURE — 85027 COMPLETE CBC AUTOMATED: CPT | Performed by: NURSE PRACTITIONER

## 2022-08-12 PROCEDURE — 97530 THERAPEUTIC ACTIVITIES: CPT

## 2022-08-12 PROCEDURE — 25010000002 FUROSEMIDE PER 20 MG: Performed by: INTERNAL MEDICINE

## 2022-08-12 RX ORDER — 3% SODIUM CHLORIDE 3 G/100ML
25 INJECTION, SOLUTION INTRAVENOUS CONTINUOUS
Status: DISPENSED | OUTPATIENT
Start: 2022-08-12 | End: 2022-08-13

## 2022-08-12 RX ORDER — SODIUM CHLORIDE 9 MG/ML
100 INJECTION, SOLUTION INTRAVENOUS CONTINUOUS
Status: DISCONTINUED | OUTPATIENT
Start: 2022-08-12 | End: 2022-08-12

## 2022-08-12 RX ORDER — FUROSEMIDE 10 MG/ML
80 INJECTION INTRAMUSCULAR; INTRAVENOUS EVERY 6 HOURS
Status: COMPLETED | OUTPATIENT
Start: 2022-08-12 | End: 2022-08-12

## 2022-08-12 RX ORDER — POTASSIUM CHLORIDE 750 MG/1
40 CAPSULE, EXTENDED RELEASE ORAL ONCE
Status: COMPLETED | OUTPATIENT
Start: 2022-08-12 | End: 2022-08-12

## 2022-08-12 RX ADMIN — SENNOSIDES AND DOCUSATE SODIUM 2 TABLET: 50; 8.6 TABLET ORAL at 08:23

## 2022-08-12 RX ADMIN — LISINOPRIL 2.5 MG: 5 TABLET ORAL at 08:23

## 2022-08-12 RX ADMIN — Medication 10 ML: at 08:23

## 2022-08-12 RX ADMIN — POTASSIUM CHLORIDE 40 MEQ: 10 CAPSULE, COATED, EXTENDED RELEASE ORAL at 21:10

## 2022-08-12 RX ADMIN — SODIUM CHLORIDE 100 ML/HR: 9 INJECTION, SOLUTION INTRAVENOUS at 14:06

## 2022-08-12 RX ADMIN — SODIUM CHLORIDE 30 ML/HR: 3 INJECTION, SOLUTION INTRAVENOUS at 18:28

## 2022-08-12 RX ADMIN — METOPROLOL SUCCINATE 100 MG: 25 TABLET, EXTENDED RELEASE ORAL at 08:23

## 2022-08-12 RX ADMIN — SODIUM CHLORIDE 30 ML/HR: 9 INJECTION, SOLUTION INTRAVENOUS at 16:54

## 2022-08-12 RX ADMIN — FUROSEMIDE 80 MG: 10 INJECTION, SOLUTION INTRAMUSCULAR; INTRAVENOUS at 16:54

## 2022-08-12 RX ADMIN — PANTOPRAZOLE SODIUM 40 MG: 40 TABLET, DELAYED RELEASE ORAL at 06:19

## 2022-08-12 RX ADMIN — LEVOTHYROXINE SODIUM 88 MCG: 88 TABLET ORAL at 08:23

## 2022-08-12 RX ADMIN — SENNOSIDES AND DOCUSATE SODIUM 2 TABLET: 50; 8.6 TABLET ORAL at 21:07

## 2022-08-12 RX ADMIN — FUROSEMIDE 80 MG: 10 INJECTION, SOLUTION INTRAMUSCULAR; INTRAVENOUS at 11:37

## 2022-08-12 NOTE — PLAN OF CARE
Goal Outcome Evaluation:  Plan of Care Reviewed With: patient        Progress: improving  Outcome Evaluation: OT tx completed. Patient supine in bed, completed supine to sit with min A; patient sat EOB and completed LBD with mod A, UBD and grooming tasks with SBA. Patient completed functional tf's, including onto BSC with CGA-Min A; walked 16' using RW CGA-Min A. Notified RN urine very potent smell and dark in color. Patient left sitting in chair with call bell within reach.

## 2022-08-12 NOTE — PROGRESS NOTES
[facility] Progress Note  [facility]. [place]        Patient Name: Noa Sutton  : 10/7/1926  MRN: [id number]   LOS: 2 days    Patient Care Team:  Matt Blake MD as PCP - General (Internal Medicine)  Carmen Neves PA-C as Referring Physician (Internal Medicine)  Kaden Grant MD as Consulting Physician (Ophthalmology)  Srinivasa Hendricks MD as Surgeon (Orthopedic Surgery)  Raymon Dela Cruz DPM as Consulting Physician (Podiatry)    Chief Complaint:    Chief Complaint   Patient presents with   • Altered Mental Status     Primary Care Physician:  Matt Blake MD  Date of admission: 8/10/2022    Subjective     Interval History:   Follow-up hyponatremia.  Events noted from last 24 hours.    Review of Systems:   As noted above.    Objective     Vitals:   Temp:  [97 °F (36.1 °C)-97.9 °F (36.6 °C)] 97 °F (36.1 °C)  Heart Rate:  [71-89] 77  Resp:  [16-18] 18  BP: (135-153)/() 148/91    Intake/Output Summary (Last 24 hours) at 2022 1127  Last data filed at 2022 0900  Gross per 24 hour   Intake 1330 ml   Output 600 ml   Net 730 ml       Physical Exam:    General Appearance: alert, no acute distress   Skin: warm and dry  HEENT: oral mucosa normal, nonicteric sclera  Neck: supple, no JVD  Lungs: Scattered crackles all over the chest.  Heart: RRR, normal S1 and S2  Abdomen: soft, nontender, nondistended, she has 2+ sacral edema  : no palpable bladder  Extremities: Trace to 1+ edema, no cyanosis or clubbing  Neuro: Demented but grossly nonfocal.    Scheduled Meds:     Current Facility-Administered Medications   Medication Dose Route Frequency Provider Last Rate Last Admin   • acetaminophen (TYLENOL) tablet 650 mg  650 mg Oral Q4H PRN Soraya Reyes APRN       • sennosides-docusate (PERICOLACE) 8.6-50 MG per tablet 2 tablet  2 tablet Oral BID Soraya Reyes APRN   2 tablet at 22 0823    And   • polyethylene glycol  (MIRALAX) packet 17 g  17 g Oral Daily PRN Soraya Reyes APRN        And   • bisacodyl (DULCOLAX) EC tablet 5 mg  5 mg Oral Daily PRN Soraya Reyes APRN        And   • bisacodyl (DULCOLAX) suppository 10 mg  10 mg Rectal Daily PRN Soraya Reyes APRN       • furosemide (LASIX) injection 80 mg  80 mg Intravenous Q6H Ed Antonio MD, FASN       • levothyroxine (SYNTHROID, LEVOTHROID) tablet 88 mcg  88 mcg Oral Daily Ed Antonio MD, FASN   88 mcg at 08/12/22 0823   • lisinopril (PRINIVIL,ZESTRIL) tablet 2.5 mg  2.5 mg Oral Q24H Ed Antonio MD, FASN   2.5 mg at 08/12/22 0823   • metoprolol succinate XL (TOPROL-XL) 24 hr tablet 100 mg  100 mg Oral Q24H Soraya Reyes APRN   100 mg at 08/12/22 0823   • ondansetron (ZOFRAN) tablet 4 mg  4 mg Oral Q6H PRN Soraya Reyes APRN        Or   • ondansetron (ZOFRAN) injection 4 mg  4 mg Intravenous Q6H PRN Soraya Reyes APRN       • pantoprazole (PROTONIX) EC tablet 40 mg  40 mg Oral QAM Soraya Reyes APRN   40 mg at 08/12/22 0619   • sodium chloride 0.9 % flush 10 mL  10 mL Intravenous PRN Ganesh Sims MD       • sodium chloride 0.9 % flush 10 mL  10 mL Intravenous Q12H Soraya Reyes APRN   10 mL at 08/12/22 0823   • sodium chloride 0.9 % infusion  30 mL/hr Intravenous Continuous Ed Antonio MD, FASN 30 mL/hr at 08/12/22 0904 30 mL/hr at 08/12/22 0904       furosemide, 80 mg, Intravenous, Q6H  levothyroxine, 88 mcg, Oral, Daily  lisinopril, 2.5 mg, Oral, Q24H  metoprolol succinate XL, 100 mg, Oral, Q24H  pantoprazole, 40 mg, Oral, QAM  senna-docusate sodium, 2 tablet, Oral, BID  sodium chloride, 10 mL, Intravenous, Q12H        IV Meds:   sodium chloride, 30 mL/hr, Last Rate: 30 mL/hr (08/12/22 0904)        Results Reviewed:   I have personally reviewed the results from the time of this admission to 8/12/2022 11:27 EDT     Results from last 7 days   Lab Units 08/12/22  0852 08/12/22  0405 08/12/22  0014 08/10/22  1749  08/10/22  1150   SODIUM mmol/L 118* 115* 115*   < > 114*   POTASSIUM mmol/L 3.5 3.8 3.7   < > 4.6   CHLORIDE mmol/L 75* 75* 75*   < > 74*   CO2 mmol/L 35.3* 31.2* 31.7*   < > 28.8   BUN mg/dL 13 13 13   < > 11   CREATININE mg/dL 0.52* 0.51* 0.54*   < > 0.50*   CALCIUM mg/dL 8.5 8.3 8.3   < > 9.0   BILIRUBIN mg/dL  --   --   --   --  1.7*   ALK PHOS U/L  --   --   --   --  108   ALT (SGPT) U/L  --   --   --   --  34*   AST (SGOT) U/L  --   --   --   --  34*   GLUCOSE mg/dL 101* 95 97   < > 120*    < > = values in this interval not displayed.       Estimated Creatinine Clearance: 60.7 mL/min (A) (by C-G formula based on SCr of 0.52 mg/dL (L)).          Results from last 7 days   Lab Units 08/11/22  0610   URIC ACID mg/dL 2.4       Results from last 7 days   Lab Units 08/12/22  0405 08/11/22  0610 08/10/22  1150   WBC 10*3/mm3 10.55 8.44 10.27   HEMOGLOBIN g/dL 12.4 11.4* 12.2   PLATELETS 10*3/mm3 249 281 382             Brief Urine Lab Results  (Last result in the past 365 days)      Color   Clarity   Blood   Leuk Est   Nitrite   Protein   CREAT   Urine HCG        08/10/22 1201 Yellow   Clear   Negative   Negative   Negative   Trace                 No results found for: UTPCR    Imaging Results (Last 24 Hours)     Procedure Component Value Units Date/Time    US Venous Doppler Lower Extremity Right (duplex) [[id number]] Collected: 08/11/22 1207     Updated: 08/11/22 1240    Narrative:      RIGHT LOWER EXTREMITY VENOUS DUPLEX DOPPLER EXAMINATION     HISTORY: Recent trauma/ swelling; E87.0-[id number] and  hyponatremia; E87.70-Fluid overload, unspecified; G93.40-Encephalopathy,  unspecified Right Lower extremity swelling.     COMPARISON:   None     PROCEDURE: Multiple transverse and longitudinal scans were performed of  the femoropopliteal deep venous system, with augmentation and  compression maneuvers.     FINDINGS: Proper phasic flow was noted in the visualized deep venous  system. No intraluminal increased  echogenicity is noted to suggest  thrombus. There is proper compression and augmentation of the venous  structures. No abnormal venous collaterals are seen.     Other: There is 9.4 x 1.3 x 1.9 cm well-defined minimally complex  anechoic fluid collection in the subcutaneous soft tissues of the  proximal lateral calf.       Impression:      No evidence of right lower extremity deep venous thrombosis.     There is  9.4 x 1.3 x 1.9 cm well-defined minimally complex anechoic  fluid collection in the subcutaneous soft tissues of the proximal  lateral calf, most likely hematoma in the given history of trauma.  Differential considerations include seroma or abscess. Correlate  clinically.           Images personally reviewed, interpreted and dictated by VIOLET Hobson.     This report was signed and finalized on 8/11/2022 12:38 PM by VIOLET Hobson.              Assessment / Plan     ASSESSMENT:  Hyponatremia: Rule out SIADH, does appear she was likely volume overloaded continue with 30 ml/hour of normal saline and will give low-dose diuretic and see if we can get her stabilized.  Work-up as ordered.  New onset atrial fibrillation: Heart rate is much more stable this morning.  Generalized weakness s/p fall: We will benefit from physical therapy and may even short-term rehab.  Hypothyroidism: Recently noted to have increased TSH, will recheck it, it was low and I increased her Synthroid from 75 to 88 mcg.  Hypertension: Blood pressure appears to be under fair control with current medications.    PLAN:  Clearly she is hypervolemic, from history as well she was taken off the diuretic and had worsening sodium.  She did not respond to low-dose diuretics we will go ahead and give her higher dose of diuretics today.  We will give 80 mg of Lasix every 6 hours x2 doses.  Leave her on 30 cc of normal saline.  Continue to follow every 4 hour BMPs for now.  If I do not see much improvement we will give her 6 hours of 30  cc an hour of hypertonic saline.  May have to consider salt tablets and low-dose diuretics in the morning.  Details were discussed with the patient no family in the room.    Details were also discussed with the hospitalist service.   Continue with rest of the current treatment plan, and monitor with surveillance labs.  Further recommendations will depend on clinical course of the patient during the current hospitalization.     Thank you for involving us in the care of Noa Sutton.  Please feel free to call with any questions.    Ed Antonio MD, FASN  08/12/22  11:27 EDT    [facility]  [phone]      Part of this note may be an electronic transcription/translation of spoken language to printed text using the Dragon Dictation System.

## 2022-08-12 NOTE — CASE MANAGEMENT/SOCIAL WORK
Continued Stay Note   Erik     Patient Name: Noa Sutton  MRN: [id number]  Today's Date: 8/12/2022    Admit Date: 8/10/2022     called client's son to discuss options for his mother as it is unclear what the plan actually is. CSW is wanting to discuss with him pt's insurance as there was a consult put in that pt may need LTC. Client's insurance will not cover long term care and we need to know if they are interested in paying privately or if they can possibly start medicaid pending process.     Pt's son (Isaias) did not answer so CSW left him a voicemail requesting a call back.   Rachel Cartwright

## 2022-08-12 NOTE — PLAN OF CARE
Problem: Adult Inpatient Plan of Care  Goal: Plan of Care Review  Outcome: Ongoing, Progressing  Goal: Patient-Specific Goal (Individualized)  Outcome: Ongoing, Progressing  Goal: Absence of Hospital-Acquired Illness or Injury  Outcome: Ongoing, Progressing  Intervention: Identify and Manage Fall Risk  Recent Flowsheet Documentation  Taken 8/12/2022 0000 by Anay Camacho RN  Safety Promotion/Fall Prevention:   activity supervised   safety round/check completed  Taken 8/11/2022 2200 by Anay Camacho RN  Safety Promotion/Fall Prevention:   activity supervised   safety round/check completed  Taken 8/11/2022 2000 by Anay Camacho RN  Safety Promotion/Fall Prevention:   activity supervised   safety round/check completed  Intervention: Prevent Skin Injury  Recent Flowsheet Documentation  Taken 8/12/2022 0000 by Anay Camacho RN  Body Position: weight shifting  Taken 8/11/2022 2200 by Anay Camacho RN  Body Position: weight shifting  Taken 8/11/2022 2000 by Anay Camacho RN  Body Position: weight shifting  Intervention: Prevent and Manage VTE (Venous Thromboembolism) Risk  Recent Flowsheet Documentation  Taken 8/12/2022 0000 by Anay Camacho RN  Activity Management: activity adjusted per tolerance  Taken 8/11/2022 2200 by Anay Camacho RN  Activity Management: activity adjusted per tolerance  Taken 8/11/2022 2000 by Anay Camacho RN  Activity Management: activity adjusted per tolerance  Goal: Optimal Comfort and Wellbeing  Outcome: Ongoing, Progressing  Goal: Readiness for Transition of Care  Outcome: Ongoing, Progressing     Problem: Fluid Volume Excess  Goal: Fluid Balance  Outcome: Ongoing, Progressing     Problem: Electrolyte Imbalance  Goal: Electrolyte Balance  Outcome: Ongoing, Progressing     Problem: Skin Injury Risk Increased  Goal: Skin Health and Integrity  Outcome: Ongoing, Progressing     Problem: Fall Injury Risk  Goal: Absence of Fall and Fall-Related Injury  Outcome: Ongoing,  Progressing  Intervention: Identify and Manage Contributors  Recent Flowsheet Documentation  Taken 8/11/2022 2000 by Anay Camacho, RN  Medication Review/Management: medications reviewed  Intervention: Promote Injury-Free Environment  Recent Flowsheet Documentation  Taken 8/12/2022 0000 by Anay Camacho, RN  Safety Promotion/Fall Prevention:   activity supervised   safety round/check completed  Taken 8/11/2022 2200 by Anay Camacho, RN  Safety Promotion/Fall Prevention:   activity supervised   safety round/check completed  Taken 8/11/2022 2000 by Anay Camacho, RN  Safety Promotion/Fall Prevention:   activity supervised   safety round/check completed   Goal Outcome Evaluation:

## 2022-08-12 NOTE — PROGRESS NOTES
[facility]    Name:  Noa Sutton   Age:  89 y.o.  Sex:  female  :  10/7/1926  MRN:  [id number]   Visit Number:  [id number]  Admission Date:  8/10/2022  Date Of Service:  22  Primary Care Physician:  Matt Blake MD     LOS: 2 days :    Chief Complaint:      Weakness and increased confusion    Subjective:    Patient seen and examined this morning.  Patient is found resting in bed on exam.  There is no family present at bedside.  She denies any pain or other problems on exam.  Patient is up to bedside commode and states she is doing well today.  Patient knows birthday and where she is at when asked.  She is pleasant in conversation.  Nephrology is following for hyponatremia with more diuresis ordered for today.    Hospital Course:    Patient is a 89 year old female who presents to the emergency department today with son who complains of altered mental status for the past 3 days.  Patient's son noted patient does have some baseline confusion and dementia which has progressively worsened in the past 3 days.  States that she has been mumbling, staring off, and had increased generalized weakness.  Son denies any recent sick contacts or sickness, no cough, no fevers, no nausea, vomiting, or diarrhea.  Patient states she just feels weak and she hasn't been eating.  Son states she has plenty of food and she just hasn't eaten as much.  Patient with recent admission (2022) for hyponatremia and A.Fib with RVR after a mechanical fall at home.   Nephrology and Cardiology were consulted with previous admission.  Patient was discharged home on Lasix 20mg every other day and transitioned to Metoprolol XL after gentle diuresis and sodium stabilizing.  Patient followed up with her PCP (Hayden) who increased her Metoprolol and stopped her Lasix.  Son wanted short term rehab during last hospitalization but patient did not qualify.  Patient went home with caregivers  coming to her home.  Son states that after last admission there were not around the clock caregivers present and that he had been staying with his mother off and on.  Advises he had been looking at private pay assisted living, although patient would not currently qualify for assisted living.  On exam, patient has received Lasix 40mg IV and has had 1L of UOP noted via purewick external catheter.     Work up in the emergency department noted proBNP-2821, sodium-114, chloride-74, creatinine-0.50, and CBC grossly unremarkable.  CXR was done and noted cardiomegaly, bilateral diffuse pulmonary opacities, most likely edema and atelectasis, secondary to congestive heart failure, superimposed airspace disease cannot be excluded.  Urinalysis was without any acute infection.  CT of head was obtained and without any acute process.    Patient was diuresed with Lasix and continued on frequent BMP checks with Nephrology following.  Patient with noted weakness, physical and occupational therapy consulted as well as case management for discharge planning.    Review of Systems:     All systems were reviewed and negative except as mentioned in subjective, assessment and plan.    Vital Signs:    Temp:  [97 °F (36.1 °C)-97.9 °F (36.6 °C)] 97 °F (36.1 °C)  Heart Rate:  [71-89] 77  Resp:  [16-18] 18  BP: (135-153)/() 148/91    Intake and output:    I/O last 3 completed shifts:  In: 1690 [P.O.:1080; I.V.:610]  Out: 2100 [Urine:2100]  I/O this shift:  In: 240 [P.O.:240]  Out: -     Physical Examination:  Examined again 8/12/2022    General Appearance:  Alert and cooperative, pleasant elderly female, no acute distress on exam, appears chronically ill   Head:  Atraumatic and normocephalic.   Eyes: Conjunctivae and sclerae normal, no icterus. No pallor.   Throat: No oral lesions, no thrush, oral mucosa moist.   Neck: Supple, trachea midline   Lungs:   Breath sounds heard bilaterally equally.  Crackles to bases   Heart:  Normal S1 and S2,  irregular, no murmur, no gallop, no rub. No JVD.   Abdomen:   Normal bowel sounds, no masses, no organomegaly. Soft, nontender, nondistended, no rebound tenderness.   Extremities: Supple, bilateral lower extremity edema R>L with weeping to right leg, no cyanosis, no clubbing.   Skin: No bleeding or rash, ecchymosis to right knee extending down leg from recent fall   Neurologic: Alert and disoriented at baseline due to advanced dementia. No facial asymmetry. Moves all four limbs. Generalized weakness is present.     Laboratory results:    Results from last 7 days   Lab Units 08/12/22  0852 08/12/22  0405 08/12/22  0014 08/10/22  1749 08/10/22  1150   SODIUM mmol/L 118* 115* 115*   < > 114*   POTASSIUM mmol/L 3.5 3.8 3.7   < > 4.6   CHLORIDE mmol/L 75* 75* 75*   < > 74*   CO2 mmol/L 35.3* 31.2* 31.7*   < > 28.8   BUN mg/dL 13 13 13   < > 11   CREATININE mg/dL 0.52* 0.51* 0.54*   < > 0.50*   CALCIUM mg/dL 8.5 8.3 8.3   < > 9.0   BILIRUBIN mg/dL  --   --   --   --  1.7*   ALK PHOS U/L  --   --   --   --  108   ALT (SGPT) U/L  --   --   --   --  34*   AST (SGOT) U/L  --   --   --   --  34*   GLUCOSE mg/dL 101* 95 97   < > 120*    < > = values in this interval not displayed.     Results from last 7 days   Lab Units 08/12/22  0405 08/11/22  0610 08/10/22  1150   WBC 10*3/mm3 10.55 8.44 10.27   HEMOGLOBIN g/dL 12.4 11.4* 12.2   HEMATOCRIT % 34.8 31.8* 34.6   PLATELETS 10*3/mm3 249 281 382         Results from last 7 days   Lab Units 08/10/22  1150   TROPONIN T ng/mL <0.010             I have reviewed the patient's laboratory results.    Radiology results:    CT Head Without Contrast    Result Date: 8/10/2022  HEAD CT     8/10/2022 1:08 PM  HISTORY: AMS X 3 days; E87.0-Mtdt-tgayrqyiil and hyponatremia; E87.70-Fluid overload, unspecified; G93.40-Encephalopathy, unspecified  TECHNIQUE: Multiple axial CT images were performed from the foramen magnum to the vertex. Coronal reformatted images were reconstructed from axial data  set. This study was performed with techniques to keep radiation doses as low as reasonably achievable (ALARA). Individualized dose reduction techniques using automated exposure control or adjustment of mA and/or kV according to the patient size were employed. 729.69 mGy.cm  COMPARISON: CT head 07/25/2022.  FINDINGS: No acute intracranial hemorrhage or large acute cortical infarct. Chronic small vessel ischemic white matter changes and generalized cerebral volume loss are present. Ventricles are prominent. No midline shift. The basal cisterns are patent. No significant interval change. Intracranial arterial atherosclerotic calcifications. Plate and screw fixation of the mandible.  No skull fracture. The visualized paranasal sinuses and mastoid air cells are clear.      Impression: No acute intracranial hemorrhage or large acute cortical infarct. Chronic microvascular ischemic white matter changes. Global cerebral volume loss. No significant interval change.   CRITICAL RESULT: No.  COMMUNICATION: Per this written report.  Images personally reviewed, interpreted, and dictated by VIOLET Hobson.          This report was signed and finalized on 8/10/2022 1:22 PM by VIOLET Hobson.    XR Chest 1 View    Result Date: 8/10/2022  CLINICAL INDICATION:  lower extremity edema, recently taken off Lasix  EXAMINATION TECHNIQUE: XR CHEST 1 VW-  COMPARISON: Radiograph 07/25/2022.  FINDINGS: Cardiomegaly. Bilateral perihilar vascular engorgement and crowding. Bilateral diffuse linear and reticular opacities, radiating from the vamsi and extending peripherally. Ill-defined bibasilar opacities. No pneumothorax. Probable trace effusions. Surgical clips in the lower neck. No acute osseous or soft tissue abnormality.      Impression: Cardiomegaly. Bilateral diffuse pulmonary opacities, most likely edema and atelectasis, secondary to congestive heart failure. Superimposed airspace disease cannot be excluded.    Images  personally reviewed, interpreted and dictated by VIOLET Hobson.       This report was signed and finalized on 8/10/2022 12:35 PM by VIOLET Hobson.    US Venous Doppler Lower Extremity Right (duplex)    Result Date: 8/11/2022  RIGHT LOWER EXTREMITY VENOUS DUPLEX DOPPLER EXAMINATION  HISTORY: Recent trauma/ swelling; E87.8-Ezax-wxnuihzyzq and hyponatremia; E87.70-Fluid overload, unspecified; G93.40-Encephalopathy, unspecified Right Lower extremity swelling.  COMPARISON:   None  PROCEDURE: Multiple transverse and longitudinal scans were performed of the femoropopliteal deep venous system, with augmentation and compression maneuvers.  FINDINGS: Proper phasic flow was noted in the visualized deep venous system. No intraluminal increased echogenicity is noted to suggest thrombus. There is proper compression and augmentation of the venous structures. No abnormal venous collaterals are seen.  Other: There is 9.4 x 1.3 x 1.9 cm well-defined minimally complex anechoic fluid collection in the subcutaneous soft tissues of the proximal lateral calf.      Impression: No evidence of right lower extremity deep venous thrombosis.  There is  9.4 x 1.3 x 1.9 cm well-defined minimally complex anechoic fluid collection in the subcutaneous soft tissues of the proximal lateral calf, most likely hematoma in the given history of trauma. Differential considerations include seroma or abscess. Correlate clinically.    Images personally reviewed, interpreted and dictated by VIOLET Hobson.  This report was signed and finalized on 8/11/2022 12:38 PM by VIOLET Hobson.    I have reviewed the patient's radiology reports.    Medication Review:     I have reviewed the patient's active and prn medications.     Problem List:      Hyponatremia      Assessment:    Hyponatremia, POA  Hypervolemia, POA  Right Leg Swelling s/p recent fall  Acute Encephalopathy, POA likely secondary to hyponatremia  Hypertension  Permanent  Atrial Fibrillation not on anticoagulation  Hypothyroidism    Plan:    Hyponatremia/ Hypervolemia/ Acute Encephalopathy worse than baseline  -Dr. Antonio (nephrology) consulted for hyponatremia; appreciate recommendations  -IV fluids with normal saline 30 ml/hr  -Given 40mg Lasix IV in the ED  -Further diuresis per nephrology--Marisela planning to continue with diuresis today with higher dosing of Lasix  -Strict I&Os  -Falls precautions  -Continue BMPs every 6 hours until stabilizing  -Monitor patient mentation, likely due to hyponatremia in the setting of baseline dementia or could just be progressing dementia  -Sodium at 118 this morning     Right leg swelling s/p recent fall  -Swelling and ecchymosis present   -Calf pain on palpation  -Doppler with no evidence of DVT, likely hematoma with recent trauma     Chronic--Afib/ Hypertension/ Hypothyroidism/ Debility  -Home medications as reconciled  -Consult PT/OT  -Case Management consulted for discharge planning, patient may need placement as she still lives alone.    DVT Prophylaxis: SCDs  Code Status: DNR/DNI  Diet: Regular/ Cardiac  Discharge Plan: 2-3 days    Soraya Reyes, APRN  08/12/22  11:46 EDT    Dictated utilizing Dragon dictation.

## 2022-08-12 NOTE — PLAN OF CARE
Goal Outcome Evaluation:  Plan of Care Reviewed With: patient, grandchild(marie), son        Progress: no change Patient continues to have sodium levels checked every 4 hours as ordered, pt being followed by nephrology and meds and IV fluids adjusted by them. Patient has been drowsy throughout shift but easily arousable.

## 2022-08-12 NOTE — THERAPY TREATMENT NOTE
Pt very lethargic, difficult to awaken with tactile stimuli, notified MARLO Michaud who came and assessed Pt. Pt sleeping soundly and had not had much rest but did awaken and tell her name. PT to follow up tomorrow.

## 2022-08-12 NOTE — THERAPY TREATMENT NOTE
Patient Name: Noa Sutton  : 10/7/1926    MRN: [id number]                              Today's Date: 2022       Admit Date: 8/10/2022    Visit Dx:     ICD-10-CM ICD-9-CM   1. Hyponatremia  E87.1 276.1   2. Hypervolemia, unspecified hypervolemia type  E87.70 276.69   3. 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)   • Salzmann's nodular degeneration   • Hyponatremia   • Atrial fibrillation (HCC)     Past Medical History:   Diagnosis Date   • Constipation    • Disease of thyroid gland    • Hypertension      Past Surgical History:   Procedure Laterality Date   • CHOLECYSTECTOMY     • THYROID SURGERY        General Information     Row Name 22 [facility]          OT Time and Intention    Document Type therapy note (daily note)  -SD     Mode of Treatment occupational therapy  -SD     Row Name 22 1212          General Information    Patient Profile Reviewed yes  -SD           User Key  (r) = Recorded By, (t) = Taken By, (c) = Cosigned By    Initials Name Provider Type    Savanah Echevarria OT Occupational Therapist                 Mobility/ADL's     Row Name 22 1212          Bed Mobility    Bed Mobility supine-sit  -SD     Supine-Sit [place] (Bed Mobility) minimum assist (75% patient effort)  -SD     Assistive Device (Bed Mobility) bed rails;head of bed elevated  -SD     Row Name 22 1212          Transfers    Transfers sit-stand transfer;toilet transfer  -SD     Sit-Stand [place] (Transfers) contact guard;minimum assist (75% patient effort)  -SD      [place] Level (Toilet Transfer) contact guard;minimum assist (75% patient effort)  -SD     Assistive Device (Toilet Transfer) commode, bedside without drop arms  -SD     Row Name 08/12/22 1212          Sit-Stand Transfer    Assistive Device (Sit-Stand Transfers) walker, front-wheeled  -SD     Row Name 08/12/22 1212          Toilet Transfer    Type (Toilet Transfer) stand pivot/stand step  -SD     Row Name 08/12/22 1212          Functional Mobility    Functional Mobility- Ind. Level contact guard assist;minimum assist (75% patient effort)  -SD     Functional Mobility- Device walker, front-wheeled  -SD     Functional Mobility-Distance (Feet) 16  -SD     Functional Mobility- Safety Issues balance decreased during turns  -SD     Row Name 08/12/22 1212          Upper Body Dressing Assessment/Training    [place] Level (Upper Body Dressing) don;pajama/robe;standby assist  -SD     Position (Upper Body Dressing) edge of bed sitting  -SD     Row Name 08/12/22 1212          Lower Body Dressing Assessment/Training    [place] Level (Lower Body Dressing) don;pants/bottoms;moderate assist (50% patient effort)  -SD     Position (Lower Body Dressing) edge of bed sitting  -SD     Row Name 08/12/22 1212          Grooming Assessment/Training    [place] Level (Grooming) hair care, combing/brushing;wash face, hands;set up  -SD     Position (Grooming) edge of bed sitting  -SD     Comment, (Grooming) therapist cleaned dentures at sink  -SD     Row Name 08/12/22 1212          Toileting Assessment/Training    [place] Level (Toileting) adjust/manage clothing;perform perineal hygiene;change pad/brief;minimum assist (75% patient effort);moderate assist (50% patient effort)  -SD     Assistive Devices (Toileting) commode, bedside without drop arms  -SD           User Key  (r) = Recorded By, (t) = Taken By, (c) = Cosigned By    Initials Name Provider Type    Savanah Echevarria OT Occupational Therapist                Obj/Interventions    No documentation.                Goals/Plan    No documentation.                Clinical Impression     Row Name 08/12/22 1213          Pain Assessment    Pretreatment Pain Rating 0/10 - no pain  -SD     Posttreatment Pain Rating 0/10 - no pain  -SD     Row Name 08/12/22 1213          Plan of Care Review    Plan of Care Reviewed With patient  -SD     Progress improving  -SD     Outcome Evaluation OT tx completed. Patient supine in bed, completed supine to sit with min A; patient sat EOB and completed LBD with mod A, UBD and grooming tasks with SBA. Patient completed functional tf's, including onto BSC with CGA-Min A; walked 16' using RW CGA-Min A. Notified RN urine very potent smell and dark in color. Patient left sitting in chair with call bell within reach.  -SD     Row Name 08/12/22 1213          Vital Signs    Pre SpO2 (%) 92  -SD     O2 Delivery Pre Treatment room air  -SD     O2 Delivery Intra Treatment room air  -SD     Post SpO2 (%) 95  -SD     O2 Delivery Post Treatment room air  -SD     Row Name 08/12/22 1213          Positioning and Restraints    Pre-Treatment Position in bed  -SD     Post Treatment Position chair  -SD     In Chair sitting;call light within reach;encouraged to call for assist;exit alarm on;notified nsg  -SD           User Key  (r) = Recorded By, (t) = Taken By, (c) = Cosigned By    Initials Name Provider Type    Savanah Echevarria OT Occupational Therapist               Outcome Measures     Row Name 08/12/22 1216          How much help from another is currently needed...    Putting on and taking off regular lower body clothing? 2  -SD     Bathing (including washing, rinsing, and drying) 2  -SD     Toileting (which includes using toilet bed pan or urinal) 2  -SD     Putting on and taking off regular upper body clothing 3  -SD     Taking care of personal grooming (such as brushing teeth) 3  -SD     Eating meals 3  -SD     AM-PAC 6 Clicks Score (OT) 15  -SD     Row  Name 08/12/22 1216          Functional Assessment    Outcome Measure Options AM-PAC 6 Clicks Daily Activity (OT)  -SD           User Key  (r) = Recorded By, (t) = Taken By, (c) = Cosigned By    Initials Name Provider Type    Savanah Echevarria OT Occupational Therapist                Occupational Therapy Education                 Title: PT OT SLP Therapies (In Progress)     Topic: Occupational Therapy (In Progress)     Point: ADL training (Done)     Description:   Instruct learner(s) on proper safety adaptation and remediation techniques during self care or transfers.   Instruct in proper use of assistive devices.              Learning Progress Summary           Patient Acceptance, E,TB, VU by SD at 8/12/2022 1216    Comment: Safety and sequencing during functional tf and mobility    Acceptance, E,TB, VU by  at 8/11/2022 1621    Comment: Role of OT/POC                   Point: Home exercise program (Not Started)     Description:   Instruct learner(s) on appropriate technique for monitoring, assisting and/or progressing therapeutic exercises/activities.              Learner Progress:  Not documented in this visit.          Point: Precautions (Not Started)     Description:   Instruct learner(s) on prescribed precautions during self-care and functional transfers.              Learner Progress:  Not documented in this visit.          Point: Body mechanics (Not Started)     Description:   Instruct learner(s) on proper positioning and spine alignment during self-care, functional mobility activities and/or exercises.              Learner Progress:  Not documented in this visit.                      User Key     Initials Effective Dates Name Provider Type Discipline     06/16/21 -  Brandie Laazro Occupational Therapist OT    SD 06/16/21 -  Savanah Jolley OT Occupational Therapist OT              OT Recommendation and Plan     Plan of Care Review  Plan of Care Reviewed With: patient  Progress: improving  Outcome  Evaluation: OT tx completed. Patient supine in bed, completed supine to sit with min A; patient sat EOB and completed LBD with mod A, UBD and grooming tasks with SBA. Patient completed functional tf's, including onto BSC with CGA-Min A; walked 16' using RW CGA-Min A. Notified RN urine very potent smell and dark in color. Patient left sitting in chair with call bell within reach.     Time Calculation:    Time Calculation- OT     Row Name 08/12/22 1217             Time Calculation- OT    OT Start Time 0930  -SD      OT Stop Time 0957  -SD      OT Time Calculation (min) 27 min  -SD      OT Received On 08/12/22  -SD      OT Goal Re-Cert Due Date 08/21/22  -SD              Timed Charges    87923 - OT Therapeutic Activity Minutes 10  -SD      60615 - OT Self Care/Mgmt Minutes 17  -SD              Total Minutes    Timed Charges Total Minutes 27  -SD       Total Minutes 27  -SD            User Key  (r) = Recorded By, (t) = Taken By, (c) = Cosigned By    Initials Name Provider Type    SD Savanah Jolley OT Occupational Therapist              Therapy Charges for Today     Code Description Service Date Service Provider Modifiers Qty    39006598851  OT THERAPEUTIC ACT EA 15 MIN 8/12/2022 Savanah Jolley OT GO 1    88029794201 HC OT SELF CARE/MGMT/TRAIN EA 15 MIN 8/12/2022 Savanah Jolley OT GO 1               Savanah Jolley OT  8/12/2022

## 2022-08-13 LAB
ALBUMIN SERPL-MCNC: 3.2 G/DL (ref 3.5–5.2)
ANION GAP SERPL CALCULATED.3IONS-SCNC: 5.6 MMOL/L (ref 5–15)
ANION GAP SERPL CALCULATED.3IONS-SCNC: 6.1 MMOL/L (ref 5–15)
ANION GAP SERPL CALCULATED.3IONS-SCNC: 6.4 MMOL/L (ref 5–15)
ANION GAP SERPL CALCULATED.3IONS-SCNC: 7.3 MMOL/L (ref 5–15)
ANION GAP SERPL CALCULATED.3IONS-SCNC: 9.8 MMOL/L (ref 5–15)
BUN SERPL-MCNC: 14 MG/DL (ref 8–23)
BUN SERPL-MCNC: 16 MG/DL (ref 8–23)
BUN SERPL-MCNC: 17 MG/DL (ref 8–23)
BUN/CREAT SERPL: 21.2 (ref 7–25)
BUN/CREAT SERPL: 24.6 (ref 7–25)
BUN/CREAT SERPL: 27.1 (ref 7–25)
BUN/CREAT SERPL: 28.6 (ref 7–25)
BUN/CREAT SERPL: 28.8 (ref 7–25)
CALCIUM SPEC-SCNC: 8.1 MG/DL (ref 8.2–9.6)
CALCIUM SPEC-SCNC: 8.1 MG/DL (ref 8.2–9.6)
CALCIUM SPEC-SCNC: 8.4 MG/DL (ref 8.2–9.6)
CALCIUM SPEC-SCNC: 8.5 MG/DL (ref 8.2–9.6)
CALCIUM SPEC-SCNC: 8.6 MG/DL (ref 8.2–9.6)
CHLORIDE SERPL-SCNC: 78 MMOL/L (ref 98–107)
CHLORIDE SERPL-SCNC: 81 MMOL/L (ref 98–107)
CHLORIDE SERPL-SCNC: 81 MMOL/L (ref 98–107)
CHLORIDE SERPL-SCNC: 82 MMOL/L (ref 98–107)
CHLORIDE SERPL-SCNC: 82 MMOL/L (ref 98–107)
CO2 SERPL-SCNC: 32.6 MMOL/L (ref 22–29)
CO2 SERPL-SCNC: 33.7 MMOL/L (ref 22–29)
CO2 SERPL-SCNC: 34.9 MMOL/L (ref 22–29)
CO2 SERPL-SCNC: 35.2 MMOL/L (ref 22–29)
CO2 SERPL-SCNC: 36.4 MMOL/L (ref 22–29)
CREAT SERPL-MCNC: 0.56 MG/DL (ref 0.57–1)
CREAT SERPL-MCNC: 0.59 MG/DL (ref 0.57–1)
CREAT SERPL-MCNC: 0.59 MG/DL (ref 0.57–1)
CREAT SERPL-MCNC: 0.65 MG/DL (ref 0.57–1)
CREAT SERPL-MCNC: 0.66 MG/DL (ref 0.57–1)
DEPRECATED RDW RBC AUTO: 41.6 FL (ref 37–54)
EGFRCR SERPLBLD CKD-EPI 2021: 80.9 ML/MIN/1.73
EGFRCR SERPLBLD CKD-EPI 2021: 81.2 ML/MIN/1.73
EGFRCR SERPLBLD CKD-EPI 2021: 83.1 ML/MIN/1.73
EGFRCR SERPLBLD CKD-EPI 2021: 83.1 ML/MIN/1.73
EGFRCR SERPLBLD CKD-EPI 2021: 84.2 ML/MIN/1.73
ERYTHROCYTE [DISTWIDTH] IN BLOOD BY AUTOMATED COUNT: 13.1 % (ref 12.3–15.4)
GLUCOSE SERPL-MCNC: 115 MG/DL (ref 65–99)
GLUCOSE SERPL-MCNC: 137 MG/DL (ref 65–99)
GLUCOSE SERPL-MCNC: 94 MG/DL (ref 65–99)
GLUCOSE SERPL-MCNC: 94 MG/DL (ref 65–99)
GLUCOSE SERPL-MCNC: 96 MG/DL (ref 65–99)
HCT VFR BLD AUTO: 37 % (ref 34–46.6)
HGB BLD-MCNC: 12.8 G/DL (ref 12–15.9)
MCH RBC QN AUTO: 30.4 PG (ref 26.6–33)
MCHC RBC AUTO-ENTMCNC: 34.6 G/DL (ref 31.5–35.7)
MCV RBC AUTO: 87.9 FL (ref 79–97)
PHOSPHATE SERPL-MCNC: 3.2 MG/DL (ref 2.5–4.5)
PLATELET # BLD AUTO: 268 10*3/MM3 (ref 140–450)
PMV BLD AUTO: 10 FL (ref 6–12)
POTASSIUM SERPL-SCNC: 3.8 MMOL/L (ref 3.5–5.2)
POTASSIUM SERPL-SCNC: 3.8 MMOL/L (ref 3.5–5.2)
POTASSIUM SERPL-SCNC: 3.9 MMOL/L (ref 3.5–5.2)
POTASSIUM SERPL-SCNC: 4.1 MMOL/L (ref 3.5–5.2)
POTASSIUM SERPL-SCNC: 4.1 MMOL/L (ref 3.5–5.2)
RBC # BLD AUTO: 4.21 10*6/MM3 (ref 3.77–5.28)
SODIUM SERPL-SCNC: 120 MMOL/L (ref 136–145)
SODIUM SERPL-SCNC: 122 MMOL/L (ref 136–145)
SODIUM SERPL-SCNC: 123 MMOL/L (ref 136–145)
SODIUM SERPL-SCNC: 123 MMOL/L (ref 136–145)
SODIUM SERPL-SCNC: 124 MMOL/L (ref 136–145)
WBC NRBC COR # BLD: 11.77 10*3/MM3 (ref 3.4–10.8)

## 2022-08-13 PROCEDURE — 80048 BASIC METABOLIC PNL TOTAL CA: CPT | Performed by: INTERNAL MEDICINE

## 2022-08-13 PROCEDURE — 85027 COMPLETE CBC AUTOMATED: CPT | Performed by: NURSE PRACTITIONER

## 2022-08-13 PROCEDURE — 97110 THERAPEUTIC EXERCISES: CPT

## 2022-08-13 PROCEDURE — 80069 RENAL FUNCTION PANEL: CPT | Performed by: INTERNAL MEDICINE

## 2022-08-13 PROCEDURE — 25010000002 FUROSEMIDE PER 20 MG: Performed by: INTERNAL MEDICINE

## 2022-08-13 PROCEDURE — 97530 THERAPEUTIC ACTIVITIES: CPT

## 2022-08-13 PROCEDURE — 99232 SBSQ HOSP IP/OBS MODERATE 35: CPT | Performed by: NURSE PRACTITIONER

## 2022-08-13 RX ORDER — FUROSEMIDE 10 MG/ML
40 INJECTION INTRAMUSCULAR; INTRAVENOUS 2 TIMES DAILY
Status: DISCONTINUED | OUTPATIENT
Start: 2022-08-13 | End: 2022-08-16

## 2022-08-13 RX ADMIN — Medication 10 ML: at 08:17

## 2022-08-13 RX ADMIN — LEVOTHYROXINE SODIUM 88 MCG: 88 TABLET ORAL at 08:17

## 2022-08-13 RX ADMIN — FUROSEMIDE 40 MG: 10 INJECTION, SOLUTION INTRAMUSCULAR; INTRAVENOUS at 21:25

## 2022-08-13 RX ADMIN — LISINOPRIL 2.5 MG: 5 TABLET ORAL at 08:17

## 2022-08-13 RX ADMIN — PANTOPRAZOLE SODIUM 40 MG: 40 TABLET, DELAYED RELEASE ORAL at 06:28

## 2022-08-13 RX ADMIN — FUROSEMIDE 40 MG: 10 INJECTION, SOLUTION INTRAMUSCULAR; INTRAVENOUS at 11:38

## 2022-08-13 RX ADMIN — SENNOSIDES AND DOCUSATE SODIUM 2 TABLET: 50; 8.6 TABLET ORAL at 21:31

## 2022-08-13 RX ADMIN — METOPROLOL SUCCINATE 100 MG: 25 TABLET, EXTENDED RELEASE ORAL at 08:17

## 2022-08-13 RX ADMIN — Medication 10 ML: at 21:25

## 2022-08-13 RX ADMIN — SENNOSIDES AND DOCUSATE SODIUM 2 TABLET: 50; 8.6 TABLET ORAL at 08:16

## 2022-08-13 NOTE — PLAN OF CARE
Goal Outcome Evaluation:  Plan of Care Reviewed With: patient           Outcome Evaluation: Pt declined OOB to chair however agreeable to sit EOB and performed B LE ex. Performed supine <->sit with min A and VC for sequencing. Performed  sitting EOB x10 mins and performed ex B LE in supine AP, SAQ, hip abd, heelslides, QS, SLR 1x12 reps with occ VC/TC for increased ROM. Con't with PT POC and progress as tolerated

## 2022-08-13 NOTE — PLAN OF CARE
Goal Outcome Evaluation:  Plan of Care Reviewed With: patient, son        Progress: improving  No new complaints voiced this shift, labs being monitored, nephrology following and adjusting medications as needed. Patient drowsy but arousable, continues to work with PT. Remains on room air at this time.CM following for possible STR at discharge

## 2022-08-13 NOTE — THERAPY TREATMENT NOTE
Patient Name: Noa Sutton  : 10/7/1926    MRN: [id number]                              Today's Date: 2022       Admit Date: 8/10/2022    Visit Dx:     ICD-10-CM ICD-9-CM   1. Hyponatremia  E87.1 276.1   2. Hypervolemia, unspecified hypervolemia type  E87.70 276.69   3. 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)   • Salzmann's nodular degeneration   • Hyponatremia   • Atrial fibrillation (HCC)     Past Medical History:   Diagnosis Date   • Constipation    • Disease of thyroid gland    • Hypertension      Past Surgical History:   Procedure Laterality Date   • CHOLECYSTECTOMY     • THYROID SURGERY        General Information     Row Name 22 162          Physical Therapy Time and Intention    Document Type therapy note (daily note)  -     Mode of Treatment physical therapy  -CC     Row Name 22 1621          General Information    Patient Profile Reviewed yes  -CC     Existing Precautions/Restrictions fall  -CC     Row Name 221          Safety Issues, Functional Mobility    Safety Issues Affecting Function (Mobility) awareness of need for assistance;insight into deficits/self-awareness;safety precautions follow-through/compliance  -CC     Impairments Affecting Function (Mobility) balance;strength;motor planning;endurance/activity tolerance  -CC           User Key  (r) = Recorded By, (t) = Taken By, (c) = Cosigned By    Initials Name Provider Type    CC Hilda Lewis PTA Physical Therapist Assistant               Mobility     Row Name 22  1622          Bed Mobility    Bed Mobility supine-sit;sit-supine  -CC     Supine-Sit Gregg (Bed Mobility) minimum assist (75% patient effort);verbal cues  -CC     Sit-Supine Gregg (Bed Mobility) minimum assist (75% patient effort);verbal cues  -CC     Assistive Device (Bed Mobility) bed rails;head of bed elevated  -CC     Comment, (Bed Mobility) sitting EOB x8 mins with SBA/CGA for balance  -CC           User Key  (r) = Recorded By, (t) = Taken By, (c) = Cosigned By    Initials Name Provider Type    CC Hilda Lewis, KENDRA Physical Therapist Assistant               Obj/Interventions    No documentation.                Goals/Plan    No documentation.                Clinical Impression     Row Name 08/13/22 1623          Pain    Pretreatment Pain Rating 0/10 - no pain  -CC     Posttreatment Pain Rating 0/10 - no pain  -CC     Additional Documentation Pain Scale: Numbers Pre/Post-Treatment (Group)  -CC     Row Name 08/13/22 1623          Plan of Care Review    Plan of Care Reviewed With patient  -CC     Outcome Evaluation Pt declined OOB to chair however agreeable to sit EOB and performed B LE ex. Performed supine <->sit with min A and VC for sequencing. Performed  sitting EOB x10 mins and performed ex B LE in supine AP, SAQ, hip abd, heelslides, QS, SLR 1x12 reps with occ VC/TC for increased ROM. Con't with PT POC and progress as tolerated  -     Row Name 08/13/22 1623          Therapy Assessment/Plan (PT)    Therapy Frequency (PT) 5 times/wk  -     Row Name 08/13/22 1623          Positioning and Restraints    Pre-Treatment Position in bed  -CC     Post Treatment Position bed  -CC     In Bed supine;call light within reach;encouraged to call for assist;exit alarm on  -CC           User Key  (r) = Recorded By, (t) = Taken By, (c) = Cosigned By    Initials Name Provider Type    Hilda Molina PTA Physical Therapist Assistant               Outcome Measures     Row Name 08/13/22 1626          How  much help from another person do you currently need...    Turning from your back to your side while in flat bed without using bedrails? 3  -CC     Moving from lying on back to sitting on the side of a flat bed without bedrails? 3  -CC     Moving to and from a bed to a chair (including a wheelchair)? 3  -CC     Standing up from a chair using your arms (e.g., wheelchair, bedside chair)? 3  -CC     Climbing 3-5 steps with a railing? 2  -CC     To walk in hospital room? 2  -CC     AM-PAC 6 Clicks Score (PT) 16  -CC     Highest level of mobility 5 --> Static standing  -CC     Row Name 08/13/22 1626          Functional Assessment    Outcome Measure Options AM-PAC 6 Clicks Basic Mobility (PT)  -CC           User Key  (r) = Recorded By, (t) = Taken By, (c) = Cosigned By    Initials Name Provider Type    CC Hilda Lewis PTA Physical Therapist Assistant                             Physical Therapy Education                 Title: PT OT SLP Therapies (In Progress)     Topic: Physical Therapy (In Progress)     Point: Mobility training (Done)     Learning Progress Summary           Patient Acceptance, E,TB, VU by  at 8/13/2022 1627    Comment: Importance of increasing mob    Acceptance, E, VU by MS at 8/11/2022 1343    Comment: importance of mobility                   Point: Home exercise program (Done)     Learning Progress Summary           Patient Acceptance, E, VU by MS at 8/11/2022 1343    Comment: importance of mobility                   Point: Body mechanics (Not Started)     Learner Progress:  Not documented in this visit.          Point: Precautions (Not Started)     Learner Progress:  Not documented in this visit.                      User Key     Initials Effective Dates Name Provider Type Discipline     06/16/21 -  Hilda Lewis PTA Physical Therapist Assistant PT    MS 07/01/22 -  Jorden Rausch PT Physical Therapist PT              PT Recommendation and Plan     Plan of Care Reviewed With:  patient  Outcome Evaluation: Pt declined OOB to chair however agreeable to sit EOB and performed B LE ex. Performed supine <->sit with min A and VC for sequencing. Performed  sitting EOB x10 mins and performed ex B LE in supine AP, SAQ, hip abd, heelslides, QS, SLR 1x12 reps with occ VC/TC for increased ROM. Con't with PT POC and progress as tolerated     Time Calculation:    PT Charges     Row Name 08/13/22 1627             Time Calculation    PT Received On 08/13/22  -      PT Goal Re-Cert Due Date 08/21/22  -CC              Time Calculation- PT    Total Timed Code Minutes- PT 30 minute(s)  -CC              Timed Charges    82881 - PT Therapeutic Exercise Minutes 15  -CC      61961 - PT Therapeutic Activity Minutes 15  -CC              Total Minutes    Timed Charges Total Minutes 30  -CC       Total Minutes 30  -CC            User Key  (r) = Recorded By, (t) = Taken By, (c) = Cosigned By    Initials Name Provider Type    CC Hilda Lewis PTA Physical Therapist Assistant              Therapy Charges for Today     Code Description Service Date Service Provider Modifiers Qty    25810655739 HC PT THERAPEUTIC ACT EA 15 MIN 8/13/2022 Hilda Lewis, KENDRA GP 1    05739900937 HC PT THER PROC EA 15 MIN 8/13/2022 Hilda Lewis, KENDRA GP 1          PT G-Codes  Outcome Measure Options: AM-PAC 6 Clicks Basic Mobility (PT)  AM-PAC 6 Clicks Score (PT): 16  AM-PAC 6 Clicks Score (OT): 15    Hilda Lewis PTA  8/13/2022

## 2022-08-13 NOTE — PROGRESS NOTES
[facility]    Name:  Noa Sutton   Age:  89 y.o.  Sex:  female  :  10/7/1926  MRN:  [id number]   Visit Number:  [id number]  Admission Date:  8/10/2022  Date Of Service:  22  Primary Care Physician:  Matt Blake MD     LOS: 3 days :    Chief Complaint:      Worsening confusion    Subjective:    Patient seen and examined with son at bedside. Son is very anxious stating that he is concerned about how he is going to take care of his Mother is she is not eligible for LTC. He states that her confusion seems to be improved. Is still not eating well. Otherwise, vitals and labs remain without significant change. Remains on room air.    Hospital Course:    Patient is a 89 year old female who presents to the emergency department today with son who complained of altered mental status worsening from baseline. Patient with recent admission (2022) for hyponatremia, Diastolic heart failure, and Atrial Fibrillation with RVR after a mechanical fall at home. Nephrology and Cardiology were consulted with previous admission.  Patient was discharged home on Lasix 20mg every other day and transitioned to Metoprolol XL after gentle diuresis and sodium stabilizing.  Patient followed up with her PCP (Hayden) who increased her Metoprolol and stopped her Lasix.  Son wanted short term rehab during last hospitalization but patient did not qualify. Patient went home with caregivers coming to her home.  Son states that after last admission there were not around the clock caregivers present and that he had been staying with his mother off and on.  Advises he had been looking at private pay assisted living, although patient would not currently qualify for assisted living.     Work up in the emergency department noted proBNP-2821, sodium-114, chloride-74, creatinine-0.50, and CBC grossly unremarkable. CXR was done and noted cardiomegaly, bilateral diffuse pulmonary opacities,  most likely edema and atelectasis, secondary to congestive heart failure, superimposed airspace disease cannot be excluded.  Urinalysis was without any acute infection.  CT of head was obtained and without any acute process.     Patient was diuresed with Lasix and continued on frequent BMP checks with Nephrology following.  Patient with noted weakness, physical and occupational therapy consulted as well as case management for discharge planning.    Review of Systems:     All systems were reviewed and negative except as mentioned in subjective, assessment and plan.    Vital Signs:    Temp:  [97 °F (36.1 °C)-98 °F (36.7 °C)] 98 °F (36.7 °C)  Heart Rate:  [] 88  Resp:  [18] 18  BP: (114-142)/() 142/105    Intake and output:    I/O last 3 completed shifts:  In: 1081 [P.O.:720; I.V.:361]  Out: 1000 [Urine:1000]  I/O this shift:  In: 240 [P.O.:240]  Out: -     Physical Examination:    General Appearance:  Alert and cooperative. Chronically ill and frail appearing.   Head:  Atraumatic and normocephalic.   Eyes: Conjunctivae and sclerae normal, no icterus. No pallor.   Throat: No oral lesions, no thrush, oral mucosa moist.   Neck: Supple, trachea midline, no thyromegaly.   Lungs:   Breath sounds heard bilaterally equally.  No wheezing or crackles. No Pleural rub or bronchial breathing.   Heart:  Normal S1 and S2, irregular, no murmur, no gallop, no rub. No JVD.   Abdomen:   Normal bowel sounds, no masses, no organomegaly. Soft, nontender, nondistended, no rebound tenderness.   Extremities: Supple, RLE pitting edema with extensive ecchymosis, no cyanosis, no clubbing. Left UE edema noted   Skin: No bleeding or rash.   Neurologic: Alert and oriented x 2. Confused conversation. No facial asymmetry. Moves all four limbs. No tremors. Generalized weakness noted.     Laboratory results:    Results from last 7 days   Lab Units 08/13/22  0838 08/13/22  0411 08/13/22  0109 08/10/22  1749 08/10/22  1150   SODIUM mmol/L  122* 120* 123*   < > 114*   POTASSIUM mmol/L 3.8 4.1 3.8   < > 4.6   CHLORIDE mmol/L 81* 81* 78*   < > 74*   CO2 mmol/L 33.7* 32.6* 35.2*   < > 28.8   BUN mg/dL 16 16 14   < > 11   CREATININE mg/dL 0.65 0.59 0.66   < > 0.50*   CALCIUM mg/dL 8.5 8.1* 8.4   < > 9.0   BILIRUBIN mg/dL  --   --   --   --  1.7*   ALK PHOS U/L  --   --   --   --  108   ALT (SGPT) U/L  --   --   --   --  34*   AST (SGOT) U/L  --   --   --   --  34*   GLUCOSE mg/dL 137* 94 96   < > 120*    < > = values in this interval not displayed.     Results from last 7 days   Lab Units 08/13/22  0411 08/12/22  0405 08/11/22  0610   WBC 10*3/mm3 11.77* 10.55 8.44   HEMOGLOBIN g/dL 12.8 12.4 11.4*   HEMATOCRIT % 37.0 34.8 31.8*   PLATELETS 10*3/mm3 268 249 281         Results from last 7 days   Lab Units 08/10/22  1150   TROPONIN T ng/mL <0.010             I have reviewed the patient's laboratory results.    Radiology results:    No radiology results from the last 24 hrs  I have reviewed the patient's radiology reports.    Medication Review:     I have reviewed the patient's active and prn medications.     Problem List:      Hyponatremia      Assessment:    Hyponatremia, POA  Diastolic Heart Failure Exacerbation  Right Leg Swelling s/p recent fall  Acute Encephalopathy, POA likely secondary to hyponatremia  Hypertension  Permanent Atrial Fibrillation not on anticoagulation  Hypothyroidism    Plan:    Hyponatremia/ Diastolic Heart Failure Exacerbation/Acute Encephalopathy worse than baseline  -Dr. Antonio (nephrology) consulted for hyponatremia; appreciate recommendations  -Strict I&Os  -Fall precautions  -Continue BMPs every 6 hours until stabilizing  -Monitor patient mentation, likely due to hyponatremia in the setting of baseline dementia or could just be progressing dementia  -Sodium at 122 this morning  - EF 55-60 % with grade 3 Diastolic Dysfunction July 2022     Right LE and LUE swelling s/p recent fall  -Swelling and ecchymosis present   -Doppler  with no evidence of DVT, likely hematoma with recent trauma  - Monitor swelling closely  - No c/o pain noted- LUE- will monitor closely.     Chronic--Afib/ Hypertension/ Hypothyroidism/ Debility  -Home medications as reconciled  -Consult PT/OT  -Case Management consulted for discharge planning, patient may need placement as she still lives alone.     DVT Prophylaxis: SCDs  Code Status: DNR/DNI  Diet: Regular/ Cardiac  Discharge Plan: 2-3 days    Marilyn Dominguez, APRN  08/13/22  11:47 EDT    Dictated utilizing Dragon dictation.

## 2022-08-13 NOTE — CASE MANAGEMENT/SOCIAL WORK
Case Management/Social Work    Patient Name:  Noa Sutton  YOB: 1926  MRN: [id number]  Admit Date:  8/10/2022      MACY spoke to pts son, Isaias, in room.  Asked if he was thinking pt may need LTC. Isaias stated that was not the plan. The plan is for pt to go to a STR and then to home. States he will stay with her and provide care if he has too. He does not want all of her money and house going to a nursing home or Medicaid.  MACY has sent referral to [facility], in [place]. MACY will continue to follow and will call [facility] on Monday to see if they have looked at the referral yet.       Electronically signed by:  PRANAV GUILLEN  08/13/22 12:50 EDT

## 2022-08-13 NOTE — PROGRESS NOTES
[facility] Progress Note      Patient Name: Noa Sutton  : 10/7/1926  MRN: [id number]  Primary Care Physician:  Matt Blake MD  Date of admission: 8/10/2022    Subjective     Interval History:   Follow-up on hyponatremia  Patient remains weak  Still with some lower extremity swelling  Had more than 1 L of urine output over last 24 hours    Review of Systems:   As noted above    Objective     Vitals:   Temp:  [97 °F (36.1 °C)-98 °F (36.7 °C)] 98 °F (36.7 °C)  Heart Rate:  [] 88  Resp:  [18] 18  BP: (114-142)/() 142/105    Intake/Output Summary (Last 24 hours) at 2022 1028  Last data filed at 2022 0900  Gross per 24 hour   Intake 1081 ml   Output 1000 ml   Net 81 ml       Physical Exam:    General Appearance: NAD  HEENT: oral mucosa normal, nonicteric sclera  Neck: supple, no JVD  Lungs: Bibasilar crackles  Heart: RRR, normal S1 and S2  Abdomen: soft, nondistended  Extremities: Mild pretibial edema bilaterally  Neuro: Awake alert and moving all extremities    Scheduled Meds:     furosemide, 40 mg, Intravenous, BID  levothyroxine, 88 mcg, Oral, Daily  metoprolol succinate XL, 100 mg, Oral, Q24H  pantoprazole, 40 mg, Oral, QAM  senna-docusate sodium, 2 tablet, Oral, BID  sodium chloride, 10 mL, Intravenous, Q12H      IV Meds:        Results Reviewed:   I have personally reviewed the results from the time of this admission to 2022 10:28 EDT     Results from last 7 days   Lab Units 22  0838 22  0411 22  0109 08/10/22  1749 08/10/22  1150   SODIUM mmol/L 122* 120* 123*   < > 114*   POTASSIUM mmol/L 3.8 4.1 3.8   < > 4.6   CHLORIDE mmol/L 81* 81* 78*   < > 74*   CO2 mmol/L 33.7* 32.6* 35.2*   < > 28.8   BUN mg/dL 16 16 14   < > 11   CREATININE mg/dL 0.65 0.59 0.66   < > 0.50*   CALCIUM mg/dL 8.5 8.1* 8.4   < > 9.0   BILIRUBIN mg/dL  --   --   --   --  1.7*   ALK PHOS U/L  --   --   --   --  108   ALT (SGPT) U/L  --   --   --   --   34*   AST (SGOT) U/L  --   --   --   --  34*   GLUCOSE mg/dL 137* 94 96   < > 120*    < > = values in this interval not displayed.     Estimated Creatinine Clearance: 48.5 mL/min (by C-G formula based on SCr of 0.65 mg/dL).      Results from last 7 days   Lab Units 08/11/22  0610   URIC ACID mg/dL 2.4     Results from last 7 days   Lab Units 08/13/22  0411 08/12/22  0405 08/11/22  0610 08/10/22  1150   WBC 10*3/mm3 11.77* 10.55 8.44 10.27   HEMOGLOBIN g/dL 12.8 12.4 11.4* 12.2   PLATELETS 10*3/mm3 268 249 281 382           Assessment / Plan     ASSESSMENT:  -Hypervolemic hyponatremia, likely in light of fluid overload state.  This appears to be compounded by poor solute intake ie ' tea and toast diet'  -Acute on chronic diastolic congestive heart failure with fluid overload state, underlying EF around 55 to 60%  -Hypothyroidism  -Hypertension    PLAN:  -Clinically appears hypervolemic still.  Discontinue IV fluids.  Start Lasix 40 mg IV twice daily with strict intake and output.  -Discussed increasing solute/protein intake and limiting free water to 1.2 L daily  -May need salt tabs with diuretics if does not keep up with solute intake  -Hold lisinopril as it can decrease hydrostatic pressure within the glomeruli and therefore exacerbating ADH release  -Renal panel later today and in a.m.  -Discussed with patient and family at bedside    Jesus Berg MD  08/13/22  10:28 EDT    [facility]  [phone]

## 2022-08-14 LAB
ALBUMIN SERPL-MCNC: 2.9 G/DL (ref 3.5–5.2)
ALBUMIN SERPL-MCNC: 3.4 G/DL (ref 3.5–5.2)
ANION GAP SERPL CALCULATED.3IONS-SCNC: 6.4 MMOL/L (ref 5–15)
ANION GAP SERPL CALCULATED.3IONS-SCNC: 8.6 MMOL/L (ref 5–15)
BUN SERPL-MCNC: 15 MG/DL (ref 8–23)
BUN SERPL-MCNC: 16 MG/DL (ref 8–23)
BUN/CREAT SERPL: 25 (ref 7–25)
BUN/CREAT SERPL: 28.3 (ref 7–25)
CALCIUM SPEC-SCNC: 7.9 MG/DL (ref 8.2–9.6)
CALCIUM SPEC-SCNC: 8.6 MG/DL (ref 8.2–9.6)
CHLORIDE SERPL-SCNC: 81 MMOL/L (ref 98–107)
CHLORIDE SERPL-SCNC: 81 MMOL/L (ref 98–107)
CO2 SERPL-SCNC: 34.4 MMOL/L (ref 22–29)
CO2 SERPL-SCNC: 39.6 MMOL/L (ref 22–29)
CREAT SERPL-MCNC: 0.53 MG/DL (ref 0.57–1)
CREAT SERPL-MCNC: 0.64 MG/DL (ref 0.57–1)
EGFRCR SERPLBLD CKD-EPI 2021: 81.5 ML/MIN/1.73
EGFRCR SERPLBLD CKD-EPI 2021: 85.3 ML/MIN/1.73
GLUCOSE SERPL-MCNC: 84 MG/DL (ref 65–99)
GLUCOSE SERPL-MCNC: 98 MG/DL (ref 65–99)
MAGNESIUM SERPL-MCNC: 1.5 MG/DL (ref 1.7–2.3)
PHOSPHATE SERPL-MCNC: 3.3 MG/DL (ref 2.5–4.5)
PHOSPHATE SERPL-MCNC: 3.5 MG/DL (ref 2.5–4.5)
POTASSIUM SERPL-SCNC: 4.1 MMOL/L (ref 3.5–5.2)
POTASSIUM SERPL-SCNC: 4.4 MMOL/L (ref 3.5–5.2)
SODIUM SERPL-SCNC: 124 MMOL/L (ref 136–145)
SODIUM SERPL-SCNC: 127 MMOL/L (ref 136–145)

## 2022-08-14 PROCEDURE — 97116 GAIT TRAINING THERAPY: CPT

## 2022-08-14 PROCEDURE — 97110 THERAPEUTIC EXERCISES: CPT

## 2022-08-14 PROCEDURE — 80069 RENAL FUNCTION PANEL: CPT | Performed by: INTERNAL MEDICINE

## 2022-08-14 PROCEDURE — 25010000002 FUROSEMIDE PER 20 MG: Performed by: INTERNAL MEDICINE

## 2022-08-14 PROCEDURE — 25010000002 MAGNESIUM SULFATE 2 GM/50ML SOLUTION: Performed by: INTERNAL MEDICINE

## 2022-08-14 PROCEDURE — 97530 THERAPEUTIC ACTIVITIES: CPT

## 2022-08-14 PROCEDURE — 99232 SBSQ HOSP IP/OBS MODERATE 35: CPT | Performed by: NURSE PRACTITIONER

## 2022-08-14 PROCEDURE — 83735 ASSAY OF MAGNESIUM: CPT | Performed by: INTERNAL MEDICINE

## 2022-08-14 RX ORDER — MAGNESIUM SULFATE HEPTAHYDRATE 40 MG/ML
2 INJECTION, SOLUTION INTRAVENOUS ONCE
Status: COMPLETED | OUTPATIENT
Start: 2022-08-14 | End: 2022-08-14

## 2022-08-14 RX ADMIN — Medication 10 ML: at 21:55

## 2022-08-14 RX ADMIN — FUROSEMIDE 40 MG: 10 INJECTION, SOLUTION INTRAMUSCULAR; INTRAVENOUS at 08:02

## 2022-08-14 RX ADMIN — PANTOPRAZOLE SODIUM 40 MG: 40 TABLET, DELAYED RELEASE ORAL at 06:30

## 2022-08-14 RX ADMIN — MAGNESIUM SULFATE HEPTAHYDRATE 2 G: 40 INJECTION, SOLUTION INTRAVENOUS at 08:02

## 2022-08-14 RX ADMIN — Medication 10 ML: at 08:03

## 2022-08-14 RX ADMIN — SENNOSIDES AND DOCUSATE SODIUM 2 TABLET: 50; 8.6 TABLET ORAL at 08:02

## 2022-08-14 RX ADMIN — FUROSEMIDE 40 MG: 10 INJECTION, SOLUTION INTRAMUSCULAR; INTRAVENOUS at 21:55

## 2022-08-14 RX ADMIN — LEVOTHYROXINE SODIUM 88 MCG: 88 TABLET ORAL at 08:02

## 2022-08-14 RX ADMIN — METOPROLOL SUCCINATE 100 MG: 25 TABLET, EXTENDED RELEASE ORAL at 08:02

## 2022-08-14 NOTE — PLAN OF CARE
Goal Outcome Evaluation:  Plan of Care Reviewed With: patient        Progress: no change  Outcome Evaluation: VSS,  NO ACUTE CHANGES OVERNIGHT

## 2022-08-14 NOTE — PROGRESS NOTES
[facility] Progress Note      Patient Name: Noa Sutton  : 10/7/1926  MRN: [id number]  Primary Care Physician:  Matt Blake MD  Date of admission: 8/10/2022    Subjective     Interval History:   Follow-up on hyponatremia  Patient remains weak  Actually starting to feel better overall  Lower extremity swelling is improving  Sodium is slowly improving    Review of Systems:   As noted above    Objective     Vitals:   Temp:  [97.3 °F (36.3 °C)-98.2 °F (36.8 °C)] 97.3 °F (36.3 °C)  Heart Rate:  [77-96] 96  Resp:  [18] 18  BP: (107-140)/() 138/81  Flow (L/min):  [2-3] 2    Intake/Output Summary (Last 24 hours) at 2022 1037  Last data filed at 2022 0845  Gross per 24 hour   Intake 720 ml   Output 1850 ml   Net -1130 ml       Physical Exam:    General Appearance: NAD  HEENT: oral mucosa normal, nonicteric sclera  Neck: supple, no JVD  Lungs: Bibasilar crackles  Heart: RRR, normal S1 and S2  Abdomen: soft, nondistended  Extremities: Mild pretibial edema bilaterally  Neuro: Awake alert and moving all extremities    Scheduled Meds:     furosemide, 40 mg, Intravenous, BID  levothyroxine, 88 mcg, Oral, Daily  metoprolol succinate XL, 100 mg, Oral, Q24H  pantoprazole, 40 mg, Oral, QAM  senna-docusate sodium, 2 tablet, Oral, BID  sodium chloride, 10 mL, Intravenous, Q12H      IV Meds:        Results Reviewed:   I have personally reviewed the results from the time of this admission to 2022 10:37 EDT     Results from last 7 days   Lab Units 22  0457 22  1645 22  1143 08/10/22  1749 08/10/22  1150   SODIUM mmol/L 124* 124* 123*   < > 114*   POTASSIUM mmol/L 4.4 4.1 3.9   < > 4.6   CHLORIDE mmol/L 81* 82* 82*   < > 74*   CO2 mmol/L 34.4* 36.4* 34.9*   < > 28.8   BUN mg/dL 15 16 17   < > 11   CREATININE mg/dL 0.53* 0.56* 0.59   < > 0.50*   CALCIUM mg/dL 7.9* 8.1* 8.6   < > 9.0   BILIRUBIN mg/dL  --   --   --   --  1.7*   ALK PHOS U/L  --   --   --    --  108   ALT (SGPT) U/L  --   --   --   --  34*   AST (SGOT) U/L  --   --   --   --  34*   GLUCOSE mg/dL 84 115* 94   < > 120*    < > = values in this interval not displayed.     Estimated Creatinine Clearance: 59.5 mL/min (A) (by C-G formula based on SCr of 0.53 mg/dL (L)).  Results from last 7 days   Lab Units 08/14/22  0457 08/13/22  1645   MAGNESIUM mg/dL 1.5*  --    PHOSPHORUS mg/dL 3.3 3.2     Results from last 7 days   Lab Units 08/11/22  0610   URIC ACID mg/dL 2.4     Results from last 7 days   Lab Units 08/13/22  0411 08/12/22  0405 08/11/22  0610 08/10/22  1150   WBC 10*3/mm3 11.77* 10.55 8.44 10.27   HEMOGLOBIN g/dL 12.8 12.4 11.4* 12.2   PLATELETS 10*3/mm3 268 249 281 382           Assessment / Plan     ASSESSMENT:  -Hypervolemic hyponatremia, likely in light of fluid overload state.  This appears to be compounded by poor solute intake ie ' tea and toast diet'  -Acute on chronic diastolic congestive heart failure with fluid overload state, underlying EF around 55 to 60%  -Hypothyroidism  -Hypertension    PLAN:  -Continue Lasix 40 mg IV twice daily as patient still looks clinically fluid overloaded with presence of edema  -Encouraged increasing solute/protein intake and limiting free water to 1.2 L daily  -Continue to hold lisinopril as it can decrease hydrostatic pressure within the glomeruli and therefore stimulating ADH release  -Renal panel later today and in a.mColton Berg MD  08/14/22  10:37 EDT    [facility]  [phone]

## 2022-08-14 NOTE — PROGRESS NOTES
[facility]    Name:  Noa Sutton   Age:  89 y.o.  Sex:  female  :  10/7/1926  MRN:  [id number]   Visit Number:  [id number]  Admission Date:  8/10/2022  Date Of Service:  22  Primary Care Physician:  Matt Blake MD     LOS: 4 days :    Chief Complaint:      Worsening confusion    Subjective:    Patient seen and examined with no family at bedside.  No acute distress noted.  No overnight events noted.  Patient alert and oriented x3 currently.  Patient was denied short-term rehab with prior hospitalization.  However, at this time patient's son Isaias Per  note does not agree with LTC.  Given this, short-term rehab referrals sent.  Patient noted to have improvement with p.o. intake.    Hospital Course:    Patient is a 89 year old female who presents to the emergency department today with son who complained of altered mental status worsening from baseline. Patient with recent admission (2022) for hyponatremia, Diastolic heart failure, and Atrial Fibrillation with RVR after a mechanical fall at home. Nephrology and Cardiology were consulted with previous admission.  Patient was discharged home on Lasix 20mg every other day and transitioned to Metoprolol XL after gentle diuresis and sodium stabilizing.  Patient followed up with her PCP (Hayden) who increased her Metoprolol and stopped her Lasix.  Son wanted short term rehab during last hospitalization but patient did not qualify. Patient went home with caregivers coming to her home.  Son states that after last admission there were not around the clock caregivers present and that he had been staying with his mother off and on.  Advises he had been looking at private pay assisted living, although patient would not currently qualify for assisted living.     Work up in the emergency department noted proBNP-2821, sodium-114, chloride-74, creatinine-0.50, and CBC grossly unremarkable. CXR was  done and noted cardiomegaly, bilateral diffuse pulmonary opacities, most likely edema and atelectasis, secondary to congestive heart failure, superimposed airspace disease cannot be excluded.  Urinalysis was without any acute infection.  CT of head was obtained and without any acute process.     Patient was diuresed with Lasix and continued on frequent BMP checks with Nephrology following.  Patient with noted weakness, physical and occupational therapy consulted as well as case management for discharge planning.    Review of Systems:     All systems were reviewed and negative except as mentioned in subjective, assessment and plan.    Vital Signs:    Temp:  [97.3 °F (36.3 °C)-98.2 °F (36.8 °C)] 97.3 °F (36.3 °C)  Heart Rate:  [77-96] 96  Resp:  [18] 18  BP: (107-140)/(77-89) 138/81    Intake and output:    I/O last 3 completed shifts:  In: 720 [P.O.:720]  Out: 2350 [Urine:2350]  I/O this shift:  In: 240 [P.O.:240]  Out: -     Physical Examination:    General Appearance:  Alert and cooperative. Chronically ill and frail appearing.   Head:  Atraumatic and normocephalic.   Eyes: Conjunctivae and sclerae normal, no icterus. No pallor.   Throat: No oral lesions, no thrush, oral mucosa moist.   Neck: Supple, trachea midline, no thyromegaly.   Lungs:   Breath sounds heard bilaterally equally.  No wheezing or crackles. No Pleural rub or bronchial breathing.   Heart:  Normal S1 and S2, irregular, no murmur, no gallop, no rub. No JVD.   Abdomen:   Normal bowel sounds, no masses, no organomegaly. Soft, nontender, nondistended, no rebound tenderness.   Extremities: Supple, RLE edema improvement noted with extensive ecchymosis, no cyanosis, no clubbing. Left UE edema noted with improvement as well. RLE edema mostly localized to right lateral shin 2/2 recent fall.   Skin: No bleeding or rash.   Neurologic: Alert and oriented x 3. No facial asymmetry. Moves all four limbs. No tremors. Severe generalized weakness noted.      Laboratory results:    Results from last 7 days   Lab Units 08/14/22  0457 08/13/22  1645 08/13/22  1143 08/10/22  1749 08/10/22  1150   SODIUM mmol/L 124* 124* 123*   < > 114*   POTASSIUM mmol/L 4.4 4.1 3.9   < > 4.6   CHLORIDE mmol/L 81* 82* 82*   < > 74*   CO2 mmol/L 34.4* 36.4* 34.9*   < > 28.8   BUN mg/dL 15 16 17   < > 11   CREATININE mg/dL 0.53* 0.56* 0.59   < > 0.50*   CALCIUM mg/dL 7.9* 8.1* 8.6   < > 9.0   BILIRUBIN mg/dL  --   --   --   --  1.7*   ALK PHOS U/L  --   --   --   --  108   ALT (SGPT) U/L  --   --   --   --  34*   AST (SGOT) U/L  --   --   --   --  34*   GLUCOSE mg/dL 84 115* 94   < > 120*    < > = values in this interval not displayed.     Results from last 7 days   Lab Units 08/13/22  0411 08/12/22  0405 08/11/22  0610   WBC 10*3/mm3 11.77* 10.55 8.44   HEMOGLOBIN g/dL 12.8 12.4 11.4*   HEMATOCRIT % 37.0 34.8 31.8*   PLATELETS 10*3/mm3 268 249 281         Results from last 7 days   Lab Units 08/10/22  1150   TROPONIN T ng/mL <0.010             I have reviewed the patient's laboratory results.    Radiology results:    No radiology results from the last 24 hrs  I have reviewed the patient's radiology reports.    Medication Review:     I have reviewed the patient's active and prn medications.     Problem List:      Hyponatremia      Assessment:    Hyponatremia, POA  Diastolic Heart Failure Exacerbation  Right Leg Swelling s/p recent fall  Acute Encephalopathy, POA likely secondary to hyponatremia  Hypertension  Permanent Atrial Fibrillation not on anticoagulation  Hypothyroidism    Plan:    Hyponatremia/ Diastolic Heart Failure Exacerbation/Acute Encephalopathy worse than baseline  -Dr. Antonio (nephrology) consulted for hyponatremia; appreciate recommendations  -Strict I&Os  -Fall precautions  -Frequent monitoring of NA- currently 124  -Monitor patient mentation, likely due to hyponatremia in the setting of baseline dementia or could just be progressing dementia  - EF 55-60 % with grade 3  Diastolic Dysfunction July 2022     Right LE and LUE swelling s/p recent fall  -Swelling and ecchymosis present   -Doppler with no evidence of DVT, likely hematoma with recent trauma  - Monitor swelling closely  - No c/o pain noted- LUE- will monitor closely.     Chronic--Afib/ Hypertension/ Hypothyroidism/ Debility  -Home medications as reconciled  -Consult PT/OT  -Case Management consulted for discharge planning, patient may need placement as she still lives alone. Severe generalized weakness noted.     DVT Prophylaxis: SCDs  Code Status: DNR/DNI  Diet: Regular/ Cardiac  Discharge Plan: 2-3 days    Marilyn Dominguez, APRN  08/14/22  12:24 EDT    Dictated utilizing Dragon dictation.

## 2022-08-14 NOTE — PLAN OF CARE
Goal Outcome Evaluation:  Plan of Care Reviewed With: patient        Progress: improving  Outcome Evaluation: Pt performed sit <->stand chair to RW x5 reps for brief change and to change linens on chair. Standing static balance observed with B UE support with SBA and no LOB. Performed B LE ex in sitting AP, LAQ, hip abd, marching 1x10 reps.  Pt amb 50 feet with RW with 2 turns with CGA/occ min A with VC for closer proximity to AD especially during turns and for upright posture as able. Con't with PT POC and progress as tolerated

## 2022-08-14 NOTE — THERAPY TREATMENT NOTE
Patient Name: Noa Sutton  : 10/7/1926    MRN: [id number]                              Today's Date: 2022       Admit Date: 8/10/2022    Visit Dx:     ICD-10-CM ICD-9-CM   1. Hyponatremia  E87.1 276.1   2. Hypervolemia, unspecified hypervolemia type  E87.70 276.69   3. 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)   • Salzmann's nodular degeneration   • Hyponatremia   • Atrial fibrillation (HCC)     Past Medical History:   Diagnosis Date   • Constipation    • Disease of thyroid gland    • Hypertension      Past Surgical History:   Procedure Laterality Date   • CHOLECYSTECTOMY     • THYROID SURGERY        General Information     Row Name 22 1039          Physical Therapy Time and Intention    Document Type therapy note (daily note)  -CC     Mode of Treatment physical therapy  -CC     Row Name 22 1039          General Information    Patient Profile Reviewed yes  -CC     Existing Precautions/Restrictions fall  -CC     Row Name 22 1039          Safety Issues, Functional Mobility    Safety Issues Affecting Function (Mobility) awareness of need for assistance;insight into deficits/self-awareness;safety precautions follow-through/compliance  -CC     Impairments Affecting Function (Mobility) balance;strength;motor planning;endurance/activity tolerance  -CC           User Key  (r) = Recorded By, (t) = Taken By, (c) = Cosigned By    Initials Name Provider Type    CC Hilda Lewis PTA Physical Therapist Assistant               Mobility     Row Name 22  1039          Sit-Stand Transfer    Sit-Stand [place] (Transfers) contact guard;minimum assist (75% patient effort);verbal cues  -     Assistive Device (Sit-Stand Transfers) walker, front-wheeled  -     Row Name 08/14/22 1039          Gait/Stairs (Locomotion)    [place] Level (Gait) contact guard;verbal cues  -     Assistive Device (Gait) walker, front-wheeled  -     Distance in Feet (Gait) 50  -     Deviations/Abnormal Patterns (Gait) base of support, narrow;anthony decreased;stride length decreased  -     Bilateral Gait Deviations forward flexed posture  -           User Key  (r) = Recorded By, (t) = Taken By, (c) = Cosigned By    Initials Name Provider Type    Hilda Molina PTA Physical Therapist Assistant               Obj/Interventions     [facility] Name 08/14/22 1039          Strength Comprehensive (MMT)    General Manual Muscle Testing (MMT) Assessment lower extremity strength deficits identified  -     Comment, General Manual Muscle Testing (MMT) Assessment B LE ex in sitting AP, LAQ, hip abd, marching 1x10 reps.  -           User Key  (r) = Recorded By, (t) = Taken By, (c) = Cosigned By    Initials Name Provider Type    Hilda Molina, KENDRA Physical Therapist Assistant               Goals/Plan    No documentation.                Clinical Impression     [facility] Name 08/14/22 1039          Pain    Pretreatment Pain Rating 0/10 - no pain  -     Posttreatment Pain Rating 0/10 - no pain  -     Pain Intervention(s) Repositioned;Ambulation/increased activity  -     Additional Documentation Pain Scale: Numbers Pre/Post-Treatment (Group)  -     Row Name 08/14/22 1039          Plan of Care Review    Plan of Care Reviewed With patient  -     Progress improving  -     Outcome Evaluation Pt performed sit <->stand chair to RW x5 reps for brief change and to change linens on chair. Standing static balance observed with B UE support with SBA and no LOB. Performed B LE ex in  sitting AP, LAQ, hip abd, marching 1x10 reps.  Pt amb 50 feet with RW with 2 turns with CGA/occ min A with VC for closer proximity to AD especially during turns and for upright posture as able. Con't with PT POC and progress as tolerated  -     Row Name 08/14/22 1039          Therapy Assessment/Plan (PT)    Therapy Frequency (PT) 5 times/wk  -     Row Name 08/14/22 1039          Positioning and Restraints    Pre-Treatment Position sitting in chair/recliner  -CC     Post Treatment Position chair  -CC     In Chair reclined;call light within reach;encouraged to call for assist;with family/caregiver  -           User Key  (r) = Recorded By, (t) = Taken By, (c) = Cosigned By    Initials Name Provider Type    Hilda Molina PTA Physical Therapist Assistant               Outcome Measures     Row Name 08/14/22 1039          How much help from another person do you currently need...    Turning from your back to your side while in flat bed without using bedrails? 3  -CC     Moving from lying on back to sitting on the side of a flat bed without bedrails? 3  -CC     Moving to and from a bed to a chair (including a wheelchair)? 3  -CC     Standing up from a chair using your arms (e.g., wheelchair, bedside chair)? 3  -CC     Climbing 3-5 steps with a railing? 2  -CC     To walk in hospital room? 3  -CC     AM-PAC 6 Clicks Score (PT) 17  -CC     Highest level of mobility 5 --> Static standing  -     Row Name 08/14/22 1039          Functional Assessment    Outcome Measure Options AM-PAC 6 Clicks Basic Mobility (PT)  -CC           User Key  (r) = Recorded By, (t) = Taken By, (c) = Cosigned By    Initials Name Provider Type    Hilda Molina PTA Physical Therapist Assistant                             Physical Therapy Education                 Title: PT OT SLP Therapies (In Progress)     Topic: Physical Therapy (In Progress)     Point: Mobility training (Done)     Learning Progress Summary           Patient  Acceptance, E,TB, VU by  at 8/13/2022 1627    Comment: Importance of increasing mob    Acceptance, E, VU by MS at 8/11/2022 1343    Comment: importance of mobility                   Point: Home exercise program (Done)     Learning Progress Summary           Patient Acceptance, E, VU by MS at 8/11/2022 1343    Comment: importance of mobility                   Point: Body mechanics (Done)     Learning Progress Summary           Patient Acceptance, E,TB, VU by  at 8/14/2022 1328    Comment: increase upright posture as able during amb                   Point: Precautions (Not Started)     Learner Progress:  Not documented in this visit.                      User Key     Initials Effective Dates Name Provider Type Discipline     06/16/21 -  Hilda Lewis PTA Physical Therapist Assistant PT    MS 07/01/22 -  Jorden Rausch, CARLITO Physical Therapist PT              PT Recommendation and Plan     Plan of Care Reviewed With: patient  Progress: improving  Outcome Evaluation: Pt performed sit <->stand chair to RW x5 reps for brief change and to change linens on chair. Standing static balance observed with B UE support with SBA and no LOB. Performed B LE ex in sitting AP, LAQ, hip abd, marching 1x10 reps.  Pt amb 50 feet with RW with 2 turns with CGA/occ min A with VC for closer proximity to AD especially during turns and for upright posture as able. Con't with PT POC and progress as tolerated     Time Calculation:    PT Charges     Row Name 08/14/22 1039             Time Calculation    Start Time 1039  -CC      Stop Time 1131  -CC      Time Calculation (min) 52 min  -CC      PT Received On 08/14/22  -CC      PT Goal Re-Cert Due Date 08/21/22  -CC              Timed Charges    81917 - PT Therapeutic Exercise Minutes 15  -CC      37916 - Gait Training Minutes  15  -CC      12632 - PT Therapeutic Activity Minutes 22  -CC              Total Minutes    Timed Charges Total Minutes 52  -CC       Total Minutes 52  -CC             User Key  (r) = Recorded By, (t) = Taken By, (c) = Cosigned By    Initials Name Provider Type    CC Hilda Lewis, KENDRA Physical Therapist Assistant              Therapy Charges for Today     Code Description Service Date Service Provider Modifiers Qty    14564797783 HC PT THERAPEUTIC ACT EA 15 MIN 8/13/2022 Hilda Lewis, KENDRA GP 1    06224882573 HC PT THER PROC EA 15 MIN 8/13/2022 Hilda Lewis, KENDRA GP 1    65310255355 HC PT THER PROC EA 15 MIN 8/14/2022 Hilda Lewis, PTA GP 1    88575700469 HC GAIT TRAINING EA 15 MIN 8/14/2022 Hilda Lewis, KENDRA GP 1    10476429968 HC PT THERAPEUTIC ACT EA 15 MIN 8/14/2022 Hilda Lewis, KENDRA GP 1          PT G-Codes  Outcome Measure Options: AM-PAC 6 Clicks Basic Mobility (PT)  AM-PAC 6 Clicks Score (PT): 17  AM-PAC 6 Clicks Score (OT): 15    Hilda Lewis PTA  8/14/2022

## 2022-08-14 NOTE — PLAN OF CARE
Goal Outcome Evaluation:  Plan of Care Reviewed With: patient, son        Progress: improving  No new complaints voiced this shift, patient followed by nephrology for her hyponatremia, labs being monitored and medications adjusted accordingly, see MAR. Patient continues to work with physical therapy.CM following for discharge planning.

## 2022-08-15 LAB
ALBUMIN SERPL-MCNC: 3.2 G/DL (ref 3.5–5.2)
ANION GAP SERPL CALCULATED.3IONS-SCNC: 2.7 MMOL/L (ref 5–15)
BUN SERPL-MCNC: 16 MG/DL (ref 8–23)
BUN/CREAT SERPL: 26.7 (ref 7–25)
CALCIUM SPEC-SCNC: 8.3 MG/DL (ref 8.2–9.6)
CHLORIDE SERPL-SCNC: 78 MMOL/L (ref 98–107)
CO2 SERPL-SCNC: 44.3 MMOL/L (ref 22–29)
CREAT SERPL-MCNC: 0.6 MG/DL (ref 0.57–1)
EGFRCR SERPLBLD CKD-EPI 2021: 82.8 ML/MIN/1.73
GLUCOSE SERPL-MCNC: 92 MG/DL (ref 65–99)
MAGNESIUM SERPL-MCNC: 1.7 MG/DL (ref 1.7–2.3)
OSMOLALITY UR: 271 MOSM/KG
PHOSPHATE SERPL-MCNC: 3.1 MG/DL (ref 2.5–4.5)
POTASSIUM SERPL-SCNC: 4 MMOL/L (ref 3.5–5.2)
SODIUM SERPL-SCNC: 125 MMOL/L (ref 136–145)
SODIUM UR-SCNC: 103 MMOL/L

## 2022-08-15 PROCEDURE — 83735 ASSAY OF MAGNESIUM: CPT | Performed by: INTERNAL MEDICINE

## 2022-08-15 PROCEDURE — 97110 THERAPEUTIC EXERCISES: CPT

## 2022-08-15 PROCEDURE — 97116 GAIT TRAINING THERAPY: CPT

## 2022-08-15 PROCEDURE — 97535 SELF CARE MNGMENT TRAINING: CPT

## 2022-08-15 PROCEDURE — 97530 THERAPEUTIC ACTIVITIES: CPT

## 2022-08-15 PROCEDURE — 99232 SBSQ HOSP IP/OBS MODERATE 35: CPT | Performed by: NURSE PRACTITIONER

## 2022-08-15 PROCEDURE — 25010000002 FUROSEMIDE PER 20 MG: Performed by: INTERNAL MEDICINE

## 2022-08-15 PROCEDURE — 84300 ASSAY OF URINE SODIUM: CPT | Performed by: INTERNAL MEDICINE

## 2022-08-15 PROCEDURE — 83935 ASSAY OF URINE OSMOLALITY: CPT | Performed by: INTERNAL MEDICINE

## 2022-08-15 PROCEDURE — 80069 RENAL FUNCTION PANEL: CPT | Performed by: INTERNAL MEDICINE

## 2022-08-15 RX ORDER — PANTOPRAZOLE SODIUM 40 MG/1
40 TABLET, DELAYED RELEASE ORAL EVERY MORNING
Refills: 3 | Status: DISCONTINUED | OUTPATIENT
Start: 2022-08-15 | End: 2022-08-15

## 2022-08-15 RX ADMIN — PANTOPRAZOLE SODIUM 40 MG: 40 TABLET, DELAYED RELEASE ORAL at 06:30

## 2022-08-15 RX ADMIN — Medication 10 ML: at 08:33

## 2022-08-15 RX ADMIN — FUROSEMIDE 40 MG: 10 INJECTION, SOLUTION INTRAMUSCULAR; INTRAVENOUS at 21:22

## 2022-08-15 RX ADMIN — LEVOTHYROXINE SODIUM 88 MCG: 88 TABLET ORAL at 08:29

## 2022-08-15 RX ADMIN — METOPROLOL SUCCINATE 100 MG: 25 TABLET, EXTENDED RELEASE ORAL at 08:29

## 2022-08-15 RX ADMIN — SENNOSIDES AND DOCUSATE SODIUM 2 TABLET: 50; 8.6 TABLET ORAL at 21:22

## 2022-08-15 RX ADMIN — FUROSEMIDE 40 MG: 10 INJECTION, SOLUTION INTRAMUSCULAR; INTRAVENOUS at 08:29

## 2022-08-15 RX ADMIN — SENNOSIDES AND DOCUSATE SODIUM 2 TABLET: 50; 8.6 TABLET ORAL at 08:29

## 2022-08-15 RX ADMIN — Medication 10 ML: at 21:22

## 2022-08-15 NOTE — PROGRESS NOTES
[facility]    Name:  Noa Sutton   Age:  89 y.o.  Sex:  female  :  10/7/1926  MRN:  [id number]   Visit Number:  [id number]  Admission Date:  8/10/2022  Date Of Service:  08/15/22  Primary Care Physician:  Matt Blake MD     LOS: 5 days :    Chief Complaint:      Worsening confusion    Subjective:    Patient seen and examined with no family at bedside.  No acute distress noted.  No overnight events noted.  Patient alert and oriented x3 currently.  Patient was denied short-term rehab with prior hospitalization.  However, at this time patient's son Isaias Per  note does not agree with LTC.  Given this, short-term rehab referrals sent.  Patient noted to have improvement with p.o. intake. NA remains low at 125 this morning. Right leg hematoma seems to be mildly improved. She denies any pain.     Hospital Course:    Patient is a 89 year old female who presents to the emergency department today with son who complained of altered mental status worsening from baseline. Patient with recent admission (2022) for hyponatremia, Diastolic heart failure, and Atrial Fibrillation with RVR after a mechanical fall at home. Nephrology and Cardiology were consulted with previous admission.  Patient was discharged home on Lasix 20mg every other day and transitioned to Metoprolol XL after gentle diuresis and sodium stabilizing.  Patient followed up with her PCP (Hayden) who increased her Metoprolol and stopped her Lasix.  Son wanted short term rehab during last hospitalization but patient did not qualify. Patient went home with caregivers coming to her home.  Son states that after last admission there were not around the clock caregivers present and that he had been staying with his mother off and on.  Advises he had been looking at private pay assisted living, although patient would not currently qualify for assisted living.     Work up in the emergency  department noted proBNP-2821, sodium-114, chloride-74, creatinine-0.50, and CBC grossly unremarkable. CXR was done and noted cardiomegaly, bilateral diffuse pulmonary opacities, most likely edema and atelectasis, secondary to congestive heart failure, superimposed airspace disease cannot be excluded.  Urinalysis was without any acute infection.  CT of head was obtained and without any acute process.     Patient was diuresed with Lasix and continued on frequent BMP checks with Nephrology following.  Patient with noted weakness, physical and occupational therapy consulted as well as case management for discharge planning. RLE hematoma most likely related to recent fall and trauma.    Review of Systems:     All systems were reviewed and negative except as mentioned in subjective, assessment and plan.    Vital Signs:    Temp:  [97.5 °F (36.4 °C)-98.3 °F (36.8 °C)] 97.5 °F (36.4 °C)  Heart Rate:  [84-90] 90  Resp:  [18] 18  BP: (115-136)/(66-94) 126/94    Intake and output:    I/O last 3 completed shifts:  In: 720 [P.O.:720]  Out: 3250 [Urine:3250]  I/O this shift:  In: 240 [P.O.:240]  Out: -     Physical Examination:    General Appearance:  Alert and cooperative. Chronically ill and frail appearing.   Head:  Atraumatic and normocephalic.   Eyes: Conjunctivae and sclerae normal, no icterus. No pallor.   Throat: No oral lesions, no thrush, oral mucosa moist.   Neck: Supple, trachea midline, no thyromegaly.   Lungs:   Breath sounds heard bilaterally equally.  No wheezing or crackles. No Pleural rub or bronchial breathing.   Heart:  Normal S1 and S2, irregular, no murmur, no gallop, no rub. No JVD.   Abdomen:   Normal bowel sounds, no masses, no organomegaly. Soft, nontender, nondistended, no rebound tenderness.   Extremities: Supple, RLE edema improvement noted with extensive ecchymosis, no cyanosis, no clubbing.  RLE hematoma mostly localized to right lateral shin 2/2 recent fall.   Skin: No bleeding or rash.    Neurologic: Alert and oriented x 3. No facial asymmetry. Moves all four limbs. No tremors. Severe generalized weakness noted.     Laboratory results:    Results from last 7 days   Lab Units 08/15/22  0551 08/14/22  1649 08/14/22  0457 08/10/22  1749 08/10/22  1150   SODIUM mmol/L 125* 127* 124*   < > 114*   POTASSIUM mmol/L 4.0 4.1 4.4   < > 4.6   CHLORIDE mmol/L 78* 81* 81*   < > 74*   CO2 mmol/L 44.3* 39.6* 34.4*   < > 28.8   BUN mg/dL 16 16 15   < > 11   CREATININE mg/dL 0.60 0.64 0.53*   < > 0.50*   CALCIUM mg/dL 8.3 8.6 7.9*   < > 9.0   BILIRUBIN mg/dL  --   --   --   --  1.7*   ALK PHOS U/L  --   --   --   --  108   ALT (SGPT) U/L  --   --   --   --  34*   AST (SGOT) U/L  --   --   --   --  34*   GLUCOSE mg/dL 92 98 84   < > 120*    < > = values in this interval not displayed.     Results from last 7 days   Lab Units 08/13/22  0411 08/12/22  0405 08/11/22  0610   WBC 10*3/mm3 11.77* 10.55 8.44   HEMOGLOBIN g/dL 12.8 12.4 11.4*   HEMATOCRIT % 37.0 34.8 31.8*   PLATELETS 10*3/mm3 268 249 281         Results from last 7 days   Lab Units 08/10/22  1150   TROPONIN T ng/mL <0.010             I have reviewed the patient's laboratory results.    Radiology results:    No radiology results from the last 24 hrs  I have reviewed the patient's radiology reports.    Medication Review:     I have reviewed the patient's active and prn medications.     Problem List:      Hyponatremia      Assessment:    Hyponatremia, POA  Diastolic Heart Failure Exacerbation, POA  Right Leg Hematoma s/p recent fall, POA  Acute Encephalopathy, POA likely secondary to hyponatremia, POA  Hypertension  Permanent Atrial Fibrillation not on anticoagulation  Hypothyroidism    Plan:    Hyponatremia/ Diastolic Heart Failure Exacerbation/Acute Encephalopathy worse than baseline  - Nephrology consulted for hyponatremia; appreciate recommendations  -Strict I&Os  -Fall precautions  -Frequent monitoring of NA- currently 125  -Monitor patient mentation,  likely due to hyponatremia in the setting of baseline dementia or could just be progressing dementia  - EF 55-60 % with grade 3 Diastolic Dysfunction July 2022     Right LE Hematoma  -Swelling and ecchymosis present   -Doppler with no evidence of DVT, likely hematoma with recent trauma  - Monitor swelling closely- discussed with RN to outline induration so that improvement can be monitored.  - No c/o pain noted  - Ice to hematoma  - Hgb stable     Chronic--Afib/ Hypertension/ Hypothyroidism/ Debility  -Home medications as reconciled  -Consult PT/OT  -Case Management consulted for discharge planning, patient may need placement as she still lives alone. Severe generalized weakness noted.     DVT Prophylaxis: SCDs  Code Status: DNR/DNI  Diet: Regular/ Cardiac  Discharge Plan: 2-3 days    Marilyn Dominguez, APRN  08/15/22  10:28 EDT    Dictated utilizing Dragon dictation.

## 2022-08-15 NOTE — PLAN OF CARE
Goal Outcome Evaluation:      VSS. No acute events. Pt sat up in chair today. Ice applied to right leg. Medications administered per orders.

## 2022-08-15 NOTE — THERAPY TREATMENT NOTE
Patient Name: Noa Sutton  : 10/7/1926    MRN: [id number]                              Today's Date: 8/15/2022       Admit Date: 8/10/2022    Visit Dx:     ICD-10-CM ICD-9-CM   1. Hyponatremia  E87.1 276.1   2. Hypervolemia, unspecified hypervolemia type  E87.70 276.69   3. 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)   • Salzmann's nodular degeneration   • Hyponatremia   • Atrial fibrillation (HCC)     Past Medical History:   Diagnosis Date   • Constipation    • Disease of thyroid gland    • Hypertension      Past Surgical History:   Procedure Laterality Date   • CHOLECYSTECTOMY     • THYROID SURGERY        General Information     Row Name 08/15/22 1550          OT Time and Intention    Document Type therapy note (daily note)  -SD     Mode of Treatment occupational therapy  -SD     Row Name 08/15/22 1552          General Information    Patient Profile Reviewed yes  -SD           User Key  (r) = Recorded By, (t) = Taken By, (c) = Cosigned By    Initials Name Provider Type    Savanah Echevarria OT Occupational Therapist                 Mobility/ADL's     Row Name 08/15/22 4475          Bed Mobility    Comment, (Bed Mobility) in chair  -SD     Row Name 08/15/22 1558          Transfers    Transfers sit-stand transfer;toilet transfer  -SD     Sit-Stand [place] (Transfers) minimum assist (75% patient effort);contact guard  -SD     [place] Level (Toilet Transfer) minimum assist (75% patient effort);contact guard  -SD     Assistive Device (Toilet Transfer) commode;ronald  bars/safety frame  -[facility] Name 08/15/22 1550          Sit-Stand Transfer    Assistive Device (Sit-Stand Transfers) walker, front-wheeled  -[facility] Name 08/15/22 1550          Toilet Transfer    Type (Toilet Transfer) sit-stand;stand-sit  -SD     Row Name 08/15/22 1550          Functional Mobility    Functional Mobility- Ind. Level contact guard assist  -SD     Functional Mobility- Device walker, front-wheeled  -SD     Functional Mobility-Distance (Feet) 46  23' x 2  -SD     [facility] Name 08/15/22 1550          Upper Body Dressing Assessment/Training    [place] Level (Upper Body Dressing) don;pajama/robe;set up  -[facility] Name 08/15/22 1550          Grooming Assessment/Training    [place] Level (Grooming) hair care, combing/brushing;oral care regimen;wash face, hands;set up  -SD     Position (Grooming) supported sitting  -SD     Row Name 08/15/22 1550          Toileting Assessment/Training    [place] Level (Toileting) minimum assist (75% patient effort)  -SD     Assistive Devices (Toileting) commode;grab bar/safety frame  -SD           User Key  (r) = Recorded By, (t) = Taken By, (c) = Cosigned By    Initials Name Provider Type    SD Savanah Jolley OT Occupational Therapist               Obj/Interventions     Row Name 08/15/22 1557          Shoulder (Therapeutic Exercise)    Shoulder (Therapeutic Exercise) AROM (active range of motion)  -SD     Shoulder AROM (Therapeutic Exercise) bilateral;flexion;extension;10 repetitions  -SD     Row Name 08/15/22 1557          Elbow/Forearm (Therapeutic Exercise)    Elbow/Forearm (Therapeutic Exercise) AROM (active range of motion)  -SD     Elbow/Forearm AROM (Therapeutic Exercise) bilateral;flexion;extension;10 repetitions  -SD     Row Name 08/15/22 1557          Wrist (Therapeutic Exercise)    Wrist (Therapeutic Exercise) AROM (active range of motion)  -SD     Wrist AROM (Therapeutic Exercise) bilateral;flexion;extension;10 repetitions  -SD     Row Name  08/15/22 1557          Hand (Therapeutic Exercise)    Hand (Therapeutic Exercise) AROM (active range of motion)  -SD     Hand AROM/AAROM (Therapeutic Exercise) bilateral;AROM (active range of motion);10 repetitions  -SD     Row Name 08/15/22 1557          Motor Skills    Therapeutic Exercise shoulder;elbow/forearm;wrist;hand  -SD           User Key  (r) = Recorded By, (t) = Taken By, (c) = Cosigned By    Initials Name Provider Type    Savanah Echevarria OT Occupational Therapist               Goals/Plan    No documentation.                Clinical Impression     Row Name 08/15/22 1557          Pain Assessment    Pretreatment Pain Rating 0/10 - no pain  -SD     Posttreatment Pain Rating 0/10 - no pain  -SD     Row Name 08/15/22 1557          Plan of Care Review    Plan of Care Reviewed With patient  -SD     Progress improving  -SD     Outcome Evaluation OT tx completed. Patient sitting in chair, completed sit to stand and functional mobility 23' x 2 using RW with CGA-Min A; completed toileting task with min A. Sitting in chair completed gown change, grooming tasks with S/U-SBA followed by UB AROM. Continue OT POC  -SD     Row Name 08/15/22 1557          Vital Signs    O2 Delivery Pre Treatment room air  -SD     O2 Delivery Intra Treatment room air  -SD     O2 Delivery Post Treatment room air  -SD     Row Name 08/15/22 1557          Positioning and Restraints    Pre-Treatment Position sitting in chair/recliner  -SD     Post Treatment Position chair  -SD     In Chair reclined;call light within reach;encouraged to call for assist;exit alarm on;with family/caregiver  -SD           User Key  (r) = Recorded By, (t) = Taken By, (c) = Cosigned By    Initials Name Provider Type    Savanah Echevarria OT Occupational Therapist               Outcome Measures     Row Name 08/15/22 1550          How much help from another is currently needed...    Putting on and taking off regular lower body clothing? 2  -SD     Bathing  (including washing, rinsing, and drying) 2  -SD     Toileting (which includes using toilet bed pan or urinal) 2  -SD     Putting on and taking off regular upper body clothing 3  -SD     Taking care of personal grooming (such as brushing teeth) 3  -SD     Eating meals 3  -SD     AM-PAC 6 Clicks Score (OT) 15  -SD     Row Name 08/15/22 1321          How much help from another person do you currently need...    Turning from your back to your side while in flat bed without using bedrails? 3  -CC     Moving from lying on back to sitting on the side of a flat bed without bedrails? 3  -CC     Moving to and from a bed to a chair (including a wheelchair)? 3  -CC     Standing up from a chair using your arms (e.g., wheelchair, bedside chair)? 3  -CC     Climbing 3-5 steps with a railing? 2  -CC     To walk in hospital room? 3  -CC     AM-PAC 6 Clicks Score (PT) 17  -CC     Highest level of mobility 5 --> Static standing  -CC     Row Name 08/15/22 1559 08/15/22 1321       Functional Assessment    Outcome Measure Options AM-PAC 6 Clicks Daily Activity (OT)  -SD AM-PAC 6 Clicks Basic Mobility (PT)  -CC          User Key  (r) = Recorded By, (t) = Taken By, (c) = Cosigned By    Initials Name Provider Type    CC Hilda Lewis, PTA Physical Therapist Assistant    Savanah Echevarria, OT Occupational Therapist                Occupational Therapy Education                 Title: PT OT SLP Therapies (In Progress)     Topic: Occupational Therapy (In Progress)     Point: ADL training (Done)     Description:   Instruct learner(s) on proper safety adaptation and remediation techniques during self care or transfers.   Instruct in proper use of assistive devices.              Learning Progress Summary           Patient Acceptance, E,TB, VU by SD at 8/12/2022 1216    Comment: Safety and sequencing during functional tf and mobility    Acceptance, E,TB, VU by  at 8/11/2022 1621    Comment: Role of OT/POC                   Point: Home  exercise program (Done)     Description:   Instruct learner(s) on appropriate technique for monitoring, assisting and/or progressing therapeutic exercises/activities.              Learning Progress Summary           Patient Acceptance, E,TB, VU by SD at 8/15/2022 0083    Comment: Benefit of UB ROM                   Point: Precautions (Not Started)     Description:   Instruct learner(s) on prescribed precautions during self-care and functional transfers.              Learner Progress:  Not documented in this visit.          Point: Body mechanics (Not Started)     Description:   Instruct learner(s) on proper positioning and spine alignment during self-care, functional mobility activities and/or exercises.              Learner Progress:  Not documented in this visit.                      User Key     Initials Effective Dates Name Provider Type Discipline     06/16/21 -  Brandie Lazaro Occupational Therapist OT    SD 06/16/21 -  Savanah Jolley OT Occupational Therapist OT              OT Recommendation and Plan     Plan of Care Review  Plan of Care Reviewed With: patient  Progress: improving  Outcome Evaluation: OT tx completed. Patient sitting in chair, completed sit to stand and functional mobility 23' x 2 using RW with CGA-Min A; completed toileting task with min A. Sitting in chair completed gown change, grooming tasks with S/U-SBA followed by UB AROM. Continue OT POC     Time Calculation:    Time Calculation- OT     Row Name 08/15/22 1600 08/15/22 1331          Time Calculation- OT    OT Start Time 1340  -SD --     OT Stop Time 1407  -SD --     OT Time Calculation (min) 27 min  -SD --     OT Received On 08/15/22  -SD --     OT Goal Re-Cert Due Date 08/21/22  -SD --            Timed Charges    67932 - OT Therapeutic Exercise Minutes 10  -SD --     24899 - Gait Training Minutes  -- 14  -CC     44256 - OT Therapeutic Activity Minutes 5  -SD --     24019 - OT Self Care/Mgmt Minutes 12  -SD --            Total  Minutes    Timed Charges Total Minutes 27  -SD 14  -CC      Total Minutes 27  -SD 14  -CC           User Key  (r) = Recorded By, (t) = Taken By, (c) = Cosigned By    Initials Name Provider Type    CC Hilda Lewis, PTA Physical Therapist Assistant    Savanah Echevarria OT Occupational Therapist              Therapy Charges for Today     Code Description Service Date Service Provider Modifiers Qty    26746478993  OT THER PROC EA 15 MIN 8/15/2022 Savanah Jolley OT GO 1    74648925869  OT SELF CARE/MGMT/TRAIN EA 15 MIN 8/15/2022 Savanah Jolley OT GO 1               Savanah Jolley OT  8/15/2022

## 2022-08-15 NOTE — THERAPY TREATMENT NOTE
Patient Name: Noa Sutton  : 10/7/1926    MRN: [id number]                              Today's Date: 8/15/2022       Admit Date: 8/10/2022    Visit Dx:     ICD-10-CM ICD-9-CM   1. Hyponatremia  E87.1 276.1   2. Hypervolemia, unspecified hypervolemia type  E87.70 276.69   3. 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)   • Salzmann's nodular degeneration   • Hyponatremia   • Atrial fibrillation (HCC)     Past Medical History:   Diagnosis Date   • Constipation    • Disease of thyroid gland    • Hypertension      Past Surgical History:   Procedure Laterality Date   • CHOLECYSTECTOMY     • THYROID SURGERY        General Information     Row Name 08/15/22 1246          Physical Therapy Time and Intention    Document Type therapy note (daily note)  -CC     Mode of Treatment physical therapy  -CC     Row Name 08/15/22 1246          General Information    Patient Profile Reviewed yes  -CC     Existing Precautions/Restrictions fall  -CC     Row Name 08/15/22 1246          Safety Issues, Functional Mobility    Safety Issues Affecting Function (Mobility) awareness of need for assistance;insight into deficits/self-awareness;safety precautions follow-through/compliance  -CC     Impairments Affecting Function (Mobility) balance;strength;motor planning;endurance/activity tolerance  -CC           User Key  (r) = Recorded By, (t) = Taken By, (c) = Cosigned By    Initials Name Provider Type    CC Hilda Lewis PTA Physical Therapist Assistant               Mobility     Row Name 08/15/22  1247          Bed Mobility    Bed Mobility supine-sit;sit-supine;rolling right;rolling left  -CC     Rolling Left [place] (Bed Mobility) standby assist;contact guard  -CC     Rolling Right [place] (Bed Mobility) standby assist;contact guard  -CC     Supine-Sit [place] (Bed Mobility) minimum assist (75% patient effort);verbal cues;contact guard  -CC     Sit-Supine [place] (Bed Mobility) minimum assist (75% patient effort);verbal cues;contact guard  -CC     Assistive Device (Bed Mobility) bed rails  -CC     Row Name 08/15/22 1247          Sit-Stand Transfer    Sit-Stand [place] (Transfers) contact guard;minimum assist (75% patient effort);verbal cues  -CC     Assistive Device (Sit-Stand Transfers) walker, front-wheeled  -CC     Row Name 08/15/22 1247          Gait/Stairs (Locomotion)    [place] Level (Gait) contact guard;verbal cues  -     Assistive Device (Gait) walker, front-wheeled  -CC     Distance in Feet (Gait) 25  -CC     Deviations/Abnormal Patterns (Gait) base of support, narrow;anthony decreased;stride length decreased  -CC     Bilateral Gait Deviations forward flexed posture  -CC           User Key  (r) = Recorded By, (t) = Taken By, (c) = Cosigned By    Initials Name Provider Type    Hilda Molina PTA Physical Therapist Assistant               Obj/Interventions     Row Name 08/15/22 1251          Strength Comprehensive (MMT)    General Manual Muscle Testing (MMT) Assessment lower extremity strength deficits identified  -CC     Comment, General Manual Muscle Testing (MMT) Assessment B LE ex in sitting AP, LAQ, hip abd, marching 1x15 reps.  -CC           User Key  (r) = Recorded By, (t) = Taken By, (c) = Cosigned By    Initials Name Provider Type    Hilda Molina PTA Physical Therapist Assistant               Goals/Plan    No documentation.                Clinical Impression     Row Name 08/15/22 1252          Pain    Pretreatment Pain Rating 0/10 - no pain   -CC     Posttreatment Pain Rating 0/10 - no pain  -CC     Additional Documentation Pain Scale: Numbers Pre/Post-Treatment (Group)  -CC     Row Name 08/15/22 1252          Plan of Care Review    Plan of Care Reviewed With patient  -CC     Progress improving  -CC     Row Name 08/15/22 1252          Therapy Assessment/Plan (PT)    Patient/Family Therapy Goals Statement (PT) Pt performed bed mobility rolling side to side with CGA/SBA and VC, supine to sit with CGA and bed rail with VC and performed sit <->stand with CGA/min A. Pt amb with RW with CGA and VC for equal step length and upright posture during amb. B LE ex in sitting AP, LAQ, hip abd, marching 1x15 reps. Pt requires increased time to perform tasks. Con't with PT POC and progress as tolerated  -CC     Row Name 08/15/22 1252          Positioning and Restraints    Pre-Treatment Position in bed  -CC     Post Treatment Position chair  -CC     In Chair reclined;call light within reach;encouraged to call for assist  -CC           User Key  (r) = Recorded By, (t) = Taken By, (c) = Cosigned By    Initials Name Provider Type    CC Hilda Lewis, PTA Physical Therapist Assistant               Outcome Measures     Row Name 08/15/22 1321          How much help from another person do you currently need...    Turning from your back to your side while in flat bed without using bedrails? 3  -CC     Moving from lying on back to sitting on the side of a flat bed without bedrails? 3  -CC     Moving to and from a bed to a chair (including a wheelchair)? 3  -CC     Standing up from a chair using your arms (e.g., wheelchair, bedside chair)? 3  -CC     Climbing 3-5 steps with a railing? 2  -CC     To walk in hospital room? 3  -CC     AM-PAC 6 Clicks Score (PT) 17  -CC     Highest level of mobility 5 --> Static standing  -CC     Row Name 08/15/22 1321          Functional Assessment    Outcome Measure Options AM-PAC 6 Clicks Basic Mobility (PT)  -CC           User Key  (r) =  Recorded By, (t) = Taken By, (c) = Cosigned By    Initials Name Provider Type     Hilda Lewis PTA Physical Therapist Assistant                             Physical Therapy Education                 Title: PT OT SLP Therapies (In Progress)     Topic: Physical Therapy (In Progress)     Point: Mobility training (Done)     Learning Progress Summary           Patient Acceptance, E,TB, VU by  at 8/15/2022 1329    Comment: Importance of increasing mobility daily    Acceptance, E,TB, VU by CC at 8/13/2022 1627    Comment: Importance of increasing mob    Acceptance, E, VU by MS at 8/11/2022 1343    Comment: importance of mobility                   Point: Home exercise program (Done)     Learning Progress Summary           Patient Acceptance, E, VU by MS at 8/11/2022 1343    Comment: importance of mobility                   Point: Body mechanics (Done)     Learning Progress Summary           Patient Acceptance, E,TB, VU by  at 8/14/2022 1328    Comment: increase upright posture as able during amb                   Point: Precautions (Not Started)     Learner Progress:  Not documented in this visit.                      User Key     Initials Effective Dates Name Provider Type Discipline     06/16/21 -  Hilda Lewis PTA Physical Therapist Assistant PT    MS 07/01/22 -  Jorden Rausch, CARLITO Physical Therapist PT              PT Recommendation and Plan     Plan of Care Reviewed With: patient  Progress: improving  Outcome Evaluation: Pt performed sit <->stand chair to RW x5 reps for brief change and to change linens on chair. Standing static balance observed with B UE support with SBA and no LOB. Performed B LE ex in sitting AP, LAQ, hip abd, marching 1x10 reps.  Pt amb 50 feet with RW with 2 turns with CGA/occ min A with VC for closer proximity to AD especially during turns and for upright posture as able. Con't with PT POC and progress as tolerated     Time Calculation:    PT Charges     Row Name 08/15/22  1331             Time Calculation    PT Received On 08/15/22  -      PT Goal Re-Cert Due Date 08/21/22  -              Time Calculation- PT    Total Timed Code Minutes- PT 44 minute(s)  -CC              Timed Charges    18880 - PT Therapeutic Exercise Minutes 15  -CC      71561 - Gait Training Minutes  14  -CC      39984 - PT Therapeutic Activity Minutes 15  -CC              Total Minutes    Timed Charges Total Minutes 44  -CC       Total Minutes 44  -CC            User Key  (r) = Recorded By, (t) = Taken By, (c) = Cosigned By    Initials Name Provider Type    CC Hilda Lewis PTA Physical Therapist Assistant              Therapy Charges for Today     Code Description Service Date Service Provider Modifiers Qty    04079920700 HC PT THER PROC EA 15 MIN 8/14/2022 Hilda Lewis, KENDRA GP 1    42965654078 HC GAIT TRAINING EA 15 MIN 8/14/2022 Hilda Lewis, PTA GP 1    29913865083 HC PT THERAPEUTIC ACT EA 15 MIN 8/14/2022 Hilda Lewis, PTA GP 1    06129861765 HC PT THER PROC EA 15 MIN 8/15/2022 Hilda Lewis, PTA GP 1    47002436361 HC GAIT TRAINING EA 15 MIN 8/15/2022 Hilda Lewis, PTA GP 1    14890214704 HC PT THERAPEUTIC ACT EA 15 MIN 8/15/2022 Hilda Lewis, KENDRA GP 1          PT G-Codes  Outcome Measure Options: AM-PAC 6 Clicks Basic Mobility (PT)  AM-PAC 6 Clicks Score (PT): 17  AM-PAC 6 Clicks Score (OT): 15    Hilda Lewis PTA  8/15/2022

## 2022-08-15 NOTE — PROGRESS NOTES
[facility] Progress Note      Patient Name: Noa Sutton  : 10/7/1926  MRN: [id number]  Primary Care Physician:  Matt Blake MD  Date of admission: 8/10/2022    Subjective     Interval History:   Follow-up on hyponatremia  Patient remains weak  Still with some lower extremity swelling  Had 1.2 L urine output charted yesterday and 1.4 L of urine output charted since midnight with the aid of Lasix 40 mg IV twice daily    Review of Systems:   As noted above    Objective     Vitals:   Temp:  [97.5 °F (36.4 °C)-98.3 °F (36.8 °C)] 97.5 °F (36.4 °C)  Heart Rate:  [84-90] 90  Resp:  [18] 18  BP: (115-136)/(66-94) 126/94  Flow (L/min):  [2] 2    Intake/Output Summary (Last 24 hours) at 8/15/2022 0931  Last data filed at 8/15/2022 0551  Gross per 24 hour   Intake 480 ml   Output 1400 ml   Net -920 ml       Physical Exam:    General Appearance: NAD  HEENT: oral mucosa normal, nonicteric sclera  Neck: supple, no JVD  Lungs: Bibasilar crackles  Heart: RRR, normal S1 and S2  Abdomen: soft, nondistended  Extremities: Mild pretibial edema bilaterally  Neuro: Awake alert and moving all extremities    Scheduled Meds:     furosemide, 40 mg, Intravenous, BID  levothyroxine, 88 mcg, Oral, Daily  metoprolol succinate XL, 100 mg, Oral, Q24H  pantoprazole, 40 mg, Oral, QAM  senna-docusate sodium, 2 tablet, Oral, BID  sodium chloride, 10 mL, Intravenous, Q12H      IV Meds:        Results Reviewed:   I have personally reviewed the results from the time of this admission to 8/15/2022 09:31 EDT     Results from last 7 days   Lab Units 08/15/22  0551 22  1649 22  0457 08/10/22  1749 08/10/22  1150   SODIUM mmol/L 125* 127* 124*   < > 114*   POTASSIUM mmol/L 4.0 4.1 4.4   < > 4.6   CHLORIDE mmol/L 78* 81* 81*   < > 74*   CO2 mmol/L 44.3* 39.6* 34.4*   < > 28.8   BUN mg/dL 16 16 15   < > 11   CREATININE mg/dL 0.60 0.64 0.53*   < > 0.50*   CALCIUM mg/dL 8.3 8.6 7.9*   < > 9.0   BILIRUBIN  mg/dL  --   --   --   --  1.7*   ALK PHOS U/L  --   --   --   --  108   ALT (SGPT) U/L  --   --   --   --  34*   AST (SGOT) U/L  --   --   --   --  34*   GLUCOSE mg/dL 92 98 84   < > 120*    < > = values in this interval not displayed.     Estimated Creatinine Clearance: 52.6 mL/min (by C-G formula based on SCr of 0.6 mg/dL).  Results from last 7 days   Lab Units 08/15/22  0551 08/14/22  1649 08/14/22  0457   MAGNESIUM mg/dL 1.7  --  1.5*   PHOSPHORUS mg/dL 3.1 3.5 3.3     Results from last 7 days   Lab Units 08/11/22  0610   URIC ACID mg/dL 2.4     Results from last 7 days   Lab Units 08/13/22  0411 08/12/22  0405 08/11/22  0610 08/10/22  1150   WBC 10*3/mm3 11.77* 10.55 8.44 10.27   HEMOGLOBIN g/dL 12.8 12.4 11.4* 12.2   PLATELETS 10*3/mm3 268 249 281 382           Assessment / Plan     ASSESSMENT:  -Hyponatremia, Hypervolemic  likely in light of fluid overload state.  This appears to be compounded by poor solute intake ie ' tea and toast diet'  -Acute on chronic diastolic congestive heart failure with fluid overload state, underlying EF around 55 to 60%  -Hypothyroidism  -Hypertension    PLAN:  -Check urine osmolality and urine sodium  -Continue Lasix 40 mg IV twice daily  -Maintain protein intake above 0.8 g/kg/day  -Fluid restriction to less than 1.2 L/day  -Continue to hold lisinopril  -Renal panel in a.m.  -Discussed with patient and family at bedside    Carlos Perez MD  08/15/22  09:31 EDT    [facility]  [phone]

## 2022-08-15 NOTE — PLAN OF CARE
Goal Outcome Evaluation:  Plan of Care Reviewed With: patient        Progress: improving  Outcome Evaluation: OT tx completed. Patient sitting in chair, completed sit to stand and functional mobility 23' x 2 using RW with CGA-Min A; completed toileting task with min A. Sitting in chair completed gown change, grooming tasks with S/U-SBA followed by UB AROM. Continue OT POC

## 2022-08-15 NOTE — CASE MANAGEMENT/SOCIAL WORK
Case Management/Social Work    Patient Name:  Noa Sutton  YOB: 1926  MRN: [id number]  Admit Date:  8/10/2022    MACY called, Anais, Admission Coordinator at [facility]. No answer, left VM. Will call back later.     Electronically signed by:  PRANAV GUILLEN  08/15/22 11:13 EDT

## 2022-08-15 NOTE — PLAN OF CARE
Goal Outcome Evaluation:  Plan of Care Reviewed With: patient        Progress: improving  Outcome Evaluation: VSS,  CON'T TO RECEIVE LASIX AS SCHEDULED,  UOP MONITORED,  DENIES ANY PAIN OR DISCOMFORT,   REQUIRES MODERATE ASSISTANCE WITH ADL'S.

## 2022-08-15 NOTE — PROGRESS NOTES
"Adult Nutrition  Assessment/PES    Patient Name:  Noa Sutton  YOB: 1926  MRN: [id number]  Admit Date:  8/10/2022    Assessment Date:  8/15/2022    Comments:    Pt w/ improved PO intake and eating an average of 61% over the past 3 days. Pt w/ MST score of 3 and need for MSA to determine malnutrition.     1. Continue current diet order as medically appropriate.   2. Encourage PO intake to meet 50% of estimated needs.   3. Encourage intake of nutritional supplement ordered.   4. Consider a MVI w/ minerals daily.   5. Monitor and replace electrolytes PRN.     RD to follow-up and available PRN.      Reason for Assessment     Row Name 08/15/22 1333          Reason for Assessment    Reason For Assessment per organizational policy;nurse/nurse practitioner consult;identified at risk by screening criteria;follow-up protocol     Diagnosis cardiac disease;nutrition related history;other (see comments);neurologic conditions  dementia, HTN, vitamin D deficiency     Identified At Risk by Screening Criteria MST SCORE 2+;reduced oral intake over the last month                  Anthropometrics     Row Name 08/15/22 1334          Anthropometrics    Height 160 cm (63\")                Labs/Tests/Procedures/Meds     Row Name 08/15/22 1333          Labs/Procedures/Meds    Lab Results Reviewed reviewed, pertinent     Lab Results Comments Low: Na High: ALT            Medications    Pertinent Medications Reviewed reviewed, pertinent     Pertinent Medications Comments lasix, levothyroxine, protonix, docusate                  Estimated/Assessed Needs - Anthropometrics     Row Name 08/15/22 1334          Anthropometrics    Height 160 cm (63\")                Nutrition Prescription Ordered     Row Name 08/15/22 1334          Nutrition Prescription PO    Current PO Diet Regular     Common Modifiers Cardiac;Fluid Restriction     Fluid Restriction mL per Day Other (comment)  1200                Evaluation of Received " "Nutrient/Fluid Intake     Row Name 08/15/22 1336          Intake Assessment    Energy/Calorie Requirement Assessment meeting needs     Protein Requirement Assessment meeting needs            PO Evaluation    Number of Days PO Intake Evaluated 3 days     Number of Meals 9     % PO Intake 61            Vitals    Height 160 cm (63\")                     Problem/Interventions:   Problem 1     Row Name 08/15/22 1334          Nutrition Diagnoses Problem 1    Problem 1 Predicted Suboptimal Intake     Etiology (related to) Medical Diagnosis     Neurological AMS;Dementia     Signs/Symptoms (evidenced by) Report of Mnimal PO Intake                      Intervention Goal     Row Name 08/15/22 1331          Intervention Goal    General Maintain nutrition;Improved nutrition related lab(s);Meet nutritional needs for age/condition;Reduce/improve symptoms;Disease management/therapy     PO Increase intake;Maintain intake;Meet estimated needs     Weight Maintain weight                Nutrition Intervention     Row Name 08/15/22 0886          Nutrition Intervention    RD/Tech Action Follow Tx progress;Care plan reviewd;Encourage intake;Recommend/ordered     Recommended/Ordered Supplement                Nutrition Prescription     Row Name 08/15/22 6663          Nutrition Prescription PO    New PO Prescription Ordered? No, recommended            Other Orders    Obtain Weight Daily     Obtain Weight Ordered? No, recommended     Supplement Vitamin mineral supplement     Supplement Ordered? No, recommended     Labs Mg++;Na+;K+     Labs Ordered? No, recommended     Other Monitor and replace electrolytes PRN.                Education/Evaluation     Row Name 08/15/22 7460          Education    Education Education not appropriate at this time     Please explain Patient confusion            Monitor/Evaluation    Monitor Per protocol;PO intake;Supplement intake;Pertinent labs;Weight;Skin status;Symptoms                 Electronically signed " by:  YOHANNES GOMEZ RD  08/15/22 13:37 EDT

## 2022-08-16 PROBLEM — G93.41 METABOLIC ENCEPHALOPATHY: Status: ACTIVE | Noted: 2022-01-01

## 2022-08-16 PROBLEM — E44.0 MODERATE MALNUTRITION (HCC): Status: ACTIVE | Noted: 2022-08-16

## 2022-08-16 LAB
ALBUMIN SERPL-MCNC: 3.1 G/DL (ref 3.5–5.2)
ANION GAP SERPL CALCULATED.3IONS-SCNC: 6.9 MMOL/L (ref 5–15)
BUN SERPL-MCNC: 17 MG/DL (ref 8–23)
BUN/CREAT SERPL: 29.8 (ref 7–25)
CALCIUM SPEC-SCNC: 8.3 MG/DL (ref 8.2–9.6)
CHLORIDE SERPL-SCNC: 79 MMOL/L (ref 98–107)
CO2 SERPL-SCNC: 41.1 MMOL/L (ref 22–29)
CREAT SERPL-MCNC: 0.57 MG/DL (ref 0.57–1)
EGFRCR SERPLBLD CKD-EPI 2021: 83.8 ML/MIN/1.73
GLUCOSE SERPL-MCNC: 87 MG/DL (ref 65–99)
PHOSPHATE SERPL-MCNC: 3.7 MG/DL (ref 2.5–4.5)
POTASSIUM SERPL-SCNC: 4.1 MMOL/L (ref 3.5–5.2)
SODIUM SERPL-SCNC: 127 MMOL/L (ref 136–145)

## 2022-08-16 PROCEDURE — 80069 RENAL FUNCTION PANEL: CPT | Performed by: INTERNAL MEDICINE

## 2022-08-16 PROCEDURE — 25010000002 FUROSEMIDE PER 20 MG: Performed by: STUDENT IN AN ORGANIZED HEALTH CARE EDUCATION/TRAINING PROGRAM

## 2022-08-16 PROCEDURE — 97530 THERAPEUTIC ACTIVITIES: CPT

## 2022-08-16 PROCEDURE — 25010000002 FUROSEMIDE PER 20 MG: Performed by: INTERNAL MEDICINE

## 2022-08-16 PROCEDURE — 99232 SBSQ HOSP IP/OBS MODERATE 35: CPT | Performed by: STUDENT IN AN ORGANIZED HEALTH CARE EDUCATION/TRAINING PROGRAM

## 2022-08-16 RX ORDER — FUROSEMIDE 10 MG/ML
40 INJECTION INTRAMUSCULAR; INTRAVENOUS 3 TIMES DAILY
Status: DISCONTINUED | OUTPATIENT
Start: 2022-08-16 | End: 2022-08-16

## 2022-08-16 RX ORDER — FUROSEMIDE 10 MG/ML
40 INJECTION INTRAMUSCULAR; INTRAVENOUS
Status: DISCONTINUED | OUTPATIENT
Start: 2022-08-16 | End: 2022-08-18

## 2022-08-16 RX ADMIN — Medication 10 ML: at 21:30

## 2022-08-16 RX ADMIN — PANTOPRAZOLE SODIUM 40 MG: 40 TABLET, DELAYED RELEASE ORAL at 06:20

## 2022-08-16 RX ADMIN — FUROSEMIDE 40 MG: 10 INJECTION, SOLUTION INTRAMUSCULAR; INTRAVENOUS at 14:31

## 2022-08-16 RX ADMIN — METOPROLOL SUCCINATE 100 MG: 25 TABLET, EXTENDED RELEASE ORAL at 08:16

## 2022-08-16 RX ADMIN — Medication 10 ML: at 08:16

## 2022-08-16 RX ADMIN — FUROSEMIDE 40 MG: 10 INJECTION, SOLUTION INTRAMUSCULAR; INTRAVENOUS at 08:17

## 2022-08-16 RX ADMIN — LEVOTHYROXINE SODIUM 88 MCG: 88 TABLET ORAL at 08:16

## 2022-08-16 RX ADMIN — SENNOSIDES AND DOCUSATE SODIUM 2 TABLET: 50; 8.6 TABLET ORAL at 08:17

## 2022-08-16 RX ADMIN — SENNOSIDES AND DOCUSATE SODIUM 2 TABLET: 50; 8.6 TABLET ORAL at 21:29

## 2022-08-16 RX ADMIN — FUROSEMIDE 40 MG: 10 INJECTION, SOLUTION INTRAMUSCULAR; INTRAVENOUS at 17:46

## 2022-08-16 NOTE — DISCHARGE PLACEMENT REQUEST
"Noa Sutton (89 y.o. Female)             Date of Birth   10/07/1926    Social Security Number       Address   [address] [place] [address]    Home Phone   [phone]    MRN   [id number]       Synagogue   None    Marital Status                               Admission Date   8/10/22    Admission Type   Emergency    Admitting Provider   Kerley, Brian Joseph, DO    Attending Provider   Eleonora Helton DO    Department, Room/Bed   [facility] MED SURG  3, /       Discharge Date       Discharge Disposition       Discharge Destination                               Attending Provider: Eleonora Helton DO    Allergies: No Known Allergies    Isolation: None   Infection: None   Code Status: No CPR   Advance Care Planning Activity    Ht: 160 cm (63\")   Wt: 69.8 kg (153 lb 14.1 oz)    Admission Cmt: None   Principal Problem: Hyponatremia [E87.1]                 Active Insurance as of 8/10/2022     Primary Coverage     Payor Plan Insurance Group Employer/Plan Group    [facility] MEDICARE REPLACEMENT [facility] MEDICARE REPLACEMENT [id number]     Payor Plan Address Payor Plan Phone Number Payor Plan Fax Number Effective Dates    [address] [address]   2016 - None Entered    [facility] [id number]       Subscriber Name Subscriber Birth Date Member ID       NOA SUTTON 10/7/1926 [id number]                 Emergency Contacts      (Rel.) Home Phone Work Phone Mobile Phone    Isaias Sutton (Son) [phone] -- --               History & Physical      Soraya Reyes APRN at 08/10/22 1402     Attestation signed by Kerley, Brian Joseph, DO at 22 1905    I have reviewed this documentation and agree.                    [facility] HOSPITALIST   HISTORY AND PHYSICAL      Name:  Noa Sutton   Age:  89 y.o.  Sex:  female  :  10/7/1926  MRN:  [id number]   Visit Number:  [id number]  Admission Date:  8/10/2022  Date Of Service:  " 08/10/22  Primary Care Physician:  Matt Blake MD    Chief Complaint:     Weakness, Swelling, Confusion    History Of Presenting Illness:      Patient is a 89 year old female who presents to the emergency department today with son who complains of altered mental status for the past 3 days.  Patient's son noted patient does have some baseline confusion and dementia which has progressively worsened in the past 3 days.  States that she has been mumbling, staring off, and had increased generalized weakness.  Son denies any recent sick contacts or sickness, no cough, no fevers, no nausea, vomiting, or diarrhea.  Patient states she just feels weak and she hasn't been eating.  Son states she has plenty of food and she just hasn't eaten as much.  Patient with recent admission (7/25/2022) for hyponatremia and A.Fib with RVR after a mechanical fall at home.   Nephrology and Cardiology were consulted with previous admission.  Patient was discharged home on Lasix 20mg every other day and transitioned to Metoprolol XL after gentle diuresis and sodium stabilizing.  Patient followed up with her PCP (Hayden) who increased her Metoprolol and stopped her Lasix.  Son wanted short term rehab during last hospitalization but patient did not qualify.  Patient went home with caregivers coming to her home.  Son states that after last admission there were not around the clock caregivers present and that he had been staying with his mother off and on.  Advises he had been looking at private pay assisted living, although patient would not currently qualify for assisted living.  On exam, patient has received Lasix 40mg IV and has had 1L of UOP noted via purewick external catheter.    Work up in the emergency department noted proBNP-2821, sodium-114, chloride-74, creatinine-0.50, and CBC grossly unremarkable.  CXR was done and noted cardiomegaly, bilateral diffuse pulmonary opacities, most likely edema and atelectasis, secondary to  congestive heart failure, superimposed airspace disease cannot be excluded.  Urinalysis was without any acute infection.  CT of head was obtained and without any acute process.  Hospitalist was consulted for admission for hyponatremia.    Review Of Systems:    All systems were reviewed and negative except as mentioned in history of presenting illness, assessment and plan.    Past Medical History: Patient  has a past medical history of Constipation, Disease of thyroid gland, and Hypertension.    Past Surgical History: Patient  has a past surgical history that includes Cholecystectomy and Thyroid surgery.    Social History: Patient  reports that she has never smoked. She has never used smokeless tobacco. She reports that she does not drink alcohol.    Family History: Patient's family history includes Arthritis in her mother; Cancer in an other family member; Heart attack in an other family member; Stroke in her mother.    Allergies:      Patient has no known allergies.    Home Medications:    Prior to Admission Medications     Prescriptions Last Dose Informant Patient Reported? Taking?    alendronate (FOSAMAX) 70 MG tablet   No No    TAKE 1 TABLET EVERY 7 DAYS    Calcium Carbonate-Vitamin D (CALTRATE 600+D PO)   Yes No    Take 1 tablet by mouth Daily.    Cholecalciferol (VITAMIN D3 PO)   Yes No    Take 50 mcg by mouth Daily.    furosemide (LASIX) 20 MG tablet   No No    Take 1 tablet by mouth Every Other Day for 30 days.    levothyroxine (SYNTHROID, LEVOTHROID) 75 MCG tablet   No No    Take 1 tablet by mouth Daily.    lisinopril (PRINIVIL,ZESTRIL) 10 MG tablet   No No    Take 1 tablet by mouth Daily for 30 days.    metoprolol succinate XL (TOPROL-XL) 100 MG 24 hr tablet   No No    Take 1 tablet by mouth Daily for 30 days.    omeprazole (priLOSEC) 20 MG capsule   No No    TAKE 1 CAPSULE DAILY    Polyethyl Glycol-Propyl Glycol (SYSTANE OP)   Yes No    Apply  to eye(s) as directed by provider.        ED  "Medications:    Medications   sodium chloride 0.9 % flush 10 mL (has no administration in time range)   furosemide (LASIX) injection 40 mg (40 mg Intravenous Given 8/10/22 1315)     Vital Signs:  Temp:  [97.9 °F (36.6 °C)] 97.9 °F (36.6 °C)  Heart Rate:  [] 94  Resp:  [22] 22  BP: (132-173)/() 166/108        08/10/22  1144   Weight: 66.7 kg (147 lb)     Body mass index is 26.04 kg/m².    Physical Exam:     Most recent vital Signs: BP (!) 166/108   Pulse 94   Temp 97.9 °F (36.6 °C) (Oral)   Resp 22   Ht 160 cm (63\")   Wt 66.7 kg (147 lb)   SpO2 92%   BMI 26.04 kg/m²     Physical Exam  Vitals and nursing note reviewed.   Constitutional:       General: She is not in acute distress.     Appearance: She is normal weight. She is ill-appearing. She is not toxic-appearing.   HENT:      Head: Normocephalic and atraumatic.      Mouth/Throat:      Mouth: Mucous membranes are moist.   Eyes:      Pupils: Pupils are equal, round, and reactive to light.   Cardiovascular:      Rate and Rhythm: Normal rate. Rhythm irregular.      Pulses: Normal pulses.      Heart sounds: Normal heart sounds.   Pulmonary:      Effort: Pulmonary effort is normal.      Breath sounds: Normal breath sounds.      Comments: Crackles to bases  Abdominal:      General: Bowel sounds are normal.      Palpations: Abdomen is soft.   Musculoskeletal:         General: Swelling (right lower extremity) and tenderness (right lower extremity s/p fall a couple weeks ago) present. Normal range of motion.      Right lower leg: Edema (with weeping) present.      Left lower leg: Edema present.      Comments: Ecchymosis to right lower extremity after recent fall   Skin:     General: Skin is warm and dry.   Neurological:      General: No focal deficit present.      Mental Status: She is alert. She is disoriented.      Motor: Weakness (generalized) present.      Comments: Baseline dementia--oriented to self, does not know year, month, and tells me that she " is at [facility].   Psychiatric:         Mood and Affect: Mood normal.         Behavior: Behavior normal.         Laboratory data:    I have reviewed the labs done in the emergency room.    Results from last 7 days   Lab Units 08/10/22  1150 08/05/22  1212   SODIUM mmol/L 114* 140   POTASSIUM mmol/L 4.6 4.6   CHLORIDE mmol/L 74* 100   TOTAL CO2 mmol/L  --  27.7   CO2 mmol/L 28.8  --    BUN mg/dL 11 18   CREATININE mg/dL 0.50* 1.30*   CALCIUM mg/dL 9.0 10.2*   BILIRUBIN mg/dL 1.7*  --    ALK PHOS U/L 108  --    ALT (SGPT) U/L 34*  --    AST (SGOT) U/L 34*  --    GLUCOSE mg/dL 120* 152*     Results from last 7 days   Lab Units 08/10/22  1150   WBC 10*3/mm3 10.27   HEMOGLOBIN g/dL 12.2   HEMATOCRIT % 34.6   PLATELETS 10*3/mm3 382         Results from last 7 days   Lab Units 08/10/22  1150   TROPONIN T ng/mL <0.010     Results from last 7 days   Lab Units 08/10/22  1150   PROBNP pg/mL 2,821.0*                 Results from last 7 days   Lab Units 08/10/22  1201   COLOR UA  Yellow   GLUCOSE UA  Negative   KETONES UA  Negative   LEUKOCYTES UA  Negative   PH, URINE  7.0   BILIRUBIN UA  Negative   UROBILINOGEN UA  1.0 E.U./dL       Pain Management Panel    There is no flowsheet data to display.       Radiology:    CT Head Without Contrast    Result Date: 8/10/2022  HEAD CT     8/10/2022 1:08 PM  HISTORY: AMS X 3 days; E87.9-Jkvy-tvwpudcucu and hyponatremia; E87.70-Fluid overload, unspecified; G93.40-Encephalopathy, unspecified  TECHNIQUE: Multiple axial CT images were performed from the foramen magnum to the vertex. Coronal reformatted images were reconstructed from axial data set. This study was performed with techniques to keep radiation doses as low as reasonably achievable (ALARA). Individualized dose reduction techniques using automated exposure control or adjustment of mA and/or kV according to the patient size were employed. 729.69 mGy.cm  COMPARISON: CT head 07/25/2022.  FINDINGS: No acute intracranial hemorrhage  or large acute cortical infarct. Chronic small vessel ischemic white matter changes and generalized cerebral volume loss are present. Ventricles are prominent. No midline shift. The basal cisterns are patent. No significant interval change. Intracranial arterial atherosclerotic calcifications. Plate and screw fixation of the mandible.  No skull fracture. The visualized paranasal sinuses and mastoid air cells are clear.      No acute intracranial hemorrhage or large acute cortical infarct. Chronic microvascular ischemic white matter changes. Global cerebral volume loss. No significant interval change.   CRITICAL RESULT: No.  COMMUNICATION: Per this written report.  Images personally reviewed, interpreted, and dictated by VIOLET Hobson.          This report was signed and finalized on 8/10/2022 1:22 PM by VIOLET Hobson.    XR Chest 1 View    Result Date: 8/10/2022  CLINICAL INDICATION:  lower extremity edema, recently taken off Lasix  EXAMINATION TECHNIQUE: XR CHEST 1 VW-  COMPARISON: Radiograph 07/25/2022.  FINDINGS: Cardiomegaly. Bilateral perihilar vascular engorgement and crowding. Bilateral diffuse linear and reticular opacities, radiating from the vamsi and extending peripherally. Ill-defined bibasilar opacities. No pneumothorax. Probable trace effusions. Surgical clips in the lower neck. No acute osseous or soft tissue abnormality.      Cardiomegaly. Bilateral diffuse pulmonary opacities, most likely edema and atelectasis, secondary to congestive heart failure. Superimposed airspace disease cannot be excluded.    Images personally reviewed, interpreted and dictated by VIOLET Hobson.       This report was signed and finalized on 8/10/2022 12:35 PM by VIOLET Hobson.      Assessment:    Hyponatremia, POA  Hypervolemia, POA  Right Leg Swelling s/p recent fall  Acute Encephalopathy, POA likely secondary to hyponatremia  Hypertension  Permanent Atrial Fibrillation not on  anticoagulation  Hypothyroidism      Plan:    Hyponatremia/ Hypervolemia/ Acute Encephalopathy worse than baselin  -Admit patient to Med/Surg with telemetry monitoring  -Consult Dr. Antonio for hyponatremia; appreciate recommendations  -IV fluids with normal saline 30ml/hr  -Given 40mg Lasix IV in the ED  -Further diuresis after nephrology has consulted  -Strict I&Os  -Falls precautions  -BMPs every 6 hours  -Monitor patient mentation, likely due to hyponatremia in the setting of baseline dementia or could just be progressing dementia    Right leg swelling s/p recent fall  -Swelling and ecchymosis present   -Calf pain on palpation  -Will order doppler of right lower extremity to rule out DVT with recent trauma    Chronic--Afib/ Hypertension/ Hypothyroidism/ Debility  -Home medications as reconciled  -Consult PT/OT  -Consult Case Management for discharge planning, patient may need placement as she still lives alone.    Patient is a DNR/DNI on admission    Risk Assessment: Moderate due to age and co-morbidities  DVT Prophylaxis: SCDs  Code Status: DNR/DNI  Diet: Regular; Cardiac    Advance Care Planning   ACP discussion was held with the patient during this visit. Patient has an advance directive (not in EMR), copy requested.         FARTUN Corona  08/10/22  14:02 EDT    Dictated utilizing Dragon dictation.    Electronically signed by Kerley, Brian Joseph, DO at 08/11/22 1905         Current Facility-Administered Medications   Medication Dose Route Frequency Provider Last Rate Last Admin   • acetaminophen (TYLENOL) tablet 650 mg  650 mg Oral Q4H PRN Soraya Reyes APRN       • sennosides-docusate (PERICOLACE) 8.6-50 MG per tablet 2 tablet  2 tablet Oral BID Soraya Reyes APRN   2 tablet at 08/16/22 0817    And   • polyethylene glycol (MIRALAX) packet 17 g  17 g Oral Daily PRN Soraya Reyes APRN        And   • bisacodyl (DULCOLAX) EC tablet 5 mg  5 mg Oral Daily PRN Soraya Reyes APRN         "And   • bisacodyl (DULCOLAX) suppository 10 mg  10 mg Rectal Daily PRN Soraya Reyes APRN       • furosemide (LASIX) injection 40 mg  40 mg Intravenous BID Jesus James MD   40 mg at 22 0817   • levothyroxine (SYNTHROID, LEVOTHROID) tablet 88 mcg  88 mcg Oral Daily Ed Antonio MD, FASN   88 mcg at 22 0816   • metoprolol succinate XL (TOPROL-XL) 24 hr tablet 100 mg  100 mg Oral Q24H Soraya Reyes APRN   100 mg at 22 0816   • ondansetron (ZOFRAN) tablet 4 mg  4 mg Oral Q6H PRN Soraya Reyes APRN        Or   • ondansetron (ZOFRAN) injection 4 mg  4 mg Intravenous Q6H PRN Soraya Reyes APRSHERRY       • pantoprazole (PROTONIX) EC tablet 40 mg  40 mg Oral QAM Soraya Reyes APRN   40 mg at 22 0620   • sodium chloride 0.9 % flush 10 mL  10 mL Intravenous PRN Ganesh Sims MD       • sodium chloride 0.9 % flush 10 mL  10 mL Intravenous Q12H Soraya Reyes APRN   10 mL at 22 0816        Physician Progress Notes (last 24 hours)      Eleonora Helton DO at 22 0915              [facility]    Name:  Noa Sutton   Age:  89 y.o.  Sex:  female  :  10/7/1926  MRN:  [id number]   Visit Number:  [id number]  Admission Date:  8/10/2022  Date Of Service:  22  Primary Care Physician:  Matt Blake MD     LOS: 6 days :    Chief Complaint:      Altered Mental Status    Subjective:    Patient seen and examined at bedside this morning. Chart reviewed and previous provided documentation noted. Patient states that she is \"doing pretty good.\" She denies any chest pain or palpitations. She ate the entirety of her breakfast and had no complaints. No family at bedside during examination.    Hospital Course:    \"Patient is a 89 year old female who presents to the emergency department today with son who complained of altered mental status worsening from baseline. Patient with recent admission (2022) for " "hyponatremia, Diastolic heart failure, and Atrial Fibrillation with RVR after a mechanical fall at home. Nephrology and Cardiology were consulted with previous admission.  Patient was discharged home on Lasix 20mg every other day and transitioned to Metoprolol XL after gentle diuresis and sodium stabilizing.  Patient followed up with her PCP (Hayden) who increased her Metoprolol and stopped her Lasix.  Son wanted short term rehab during last hospitalization but patient did not qualify. Patient went home with caregivers coming to her home.  Son states that after last admission there were not around the clock caregivers present and that he had been staying with his mother off and on.  Advises he had been looking at private pay assisted living, although patient would not currently qualify for assisted living.     Work up in the emergency department noted proBNP-2821, sodium-114, chloride-74, creatinine-0.50, and CBC grossly unremarkable. CXR was done and noted cardiomegaly, bilateral diffuse pulmonary opacities, most likely edema and atelectasis, secondary to congestive heart failure, superimposed airspace disease cannot be excluded.  Urinalysis was without any acute infection.  CT of head was obtained and without any acute process.     Patient was diuresed with Lasix and continued on frequent BMP checks with Nephrology following.  Patient with noted weakness, physical and occupational therapy consulted as well as case management for discharge planning. RLE hematoma most likely related to recent fall and trauma.\"    Review of Systems:     All systems were reviewed and negative except as mentioned in subjective, assessment and plan.    Vital Signs:    Temp:  [96.6 °F (35.9 °C)-98.1 °F (36.7 °C)] 98 °F (36.7 °C)  Heart Rate:  [81-90] 84  Resp:  [17-18] 18  BP: (108-139)/() 121/78    Intake and output:    I/O last 3 completed shifts:  In: 720 [P.O.:720]  Out: 3500 [Urine:3500]  I/O this shift:  In: 240 " [P.O.:240]  Out: -     Physical Examination:    General Appearance:  Alert and cooperative. Sitting in bed, in no acute distress. Frail.   Head:  Atraumatic and normocephalic.   Eyes: Conjunctivae and sclerae normal, no icterus.            Lungs:   Breath sounds heard bilaterally equally.  No wheezing or crackles.    Heart:  Normal S1 and S2, no murmur, no gallop, no rub.   Abdomen:   Normal bowel sounds, no masses, no organomegaly. Soft, nontender, nondistended, no rebound tenderness.   Extremities: Supple, Right lower extremity with extensive ecchymosis, improved. no edema, no cyanosis, no clubbing.   Skin: No bleeding or rash.   Neurologic: Alert and oriented x 3. No facial asymmetry. Moves all four limbs. Generalized weakness.      Laboratory results:    Results from last 7 days   Lab Units 08/16/22  0627 08/15/22  0551 08/14/22  1649 08/10/22  1749 08/10/22  1150   SODIUM mmol/L 127* 125* 127*   < > 114*   POTASSIUM mmol/L 4.1 4.0 4.1   < > 4.6   CHLORIDE mmol/L 79* 78* 81*   < > 74*   CO2 mmol/L 41.1* 44.3* 39.6*   < > 28.8   BUN mg/dL 17 16 16   < > 11   CREATININE mg/dL 0.57 0.60 0.64   < > 0.50*   CALCIUM mg/dL 8.3 8.3 8.6   < > 9.0   BILIRUBIN mg/dL  --   --   --   --  1.7*   ALK PHOS U/L  --   --   --   --  108   ALT (SGPT) U/L  --   --   --   --  34*   AST (SGOT) U/L  --   --   --   --  34*   GLUCOSE mg/dL 87 92 98   < > 120*    < > = values in this interval not displayed.     Results from last 7 days   Lab Units 08/13/22  0411 08/12/22  0405 08/11/22  0610   WBC 10*3/mm3 11.77* 10.55 8.44   HEMOGLOBIN g/dL 12.8 12.4 11.4*   HEMATOCRIT % 37.0 34.8 31.8*   PLATELETS 10*3/mm3 268 249 281         Results from last 7 days   Lab Units 08/10/22  1150   TROPONIN T ng/mL <0.010             I have reviewed the patient's laboratory results.    Radiology results:    No radiology results from the last 24 hrs  I have reviewed the patient's radiology reports.    Medication Review:     I have reviewed the patient's  active and prn medications.     Problem List:      Hyponatremia      Assessment:    1. Acute Metabolic Encephalopathy secondary to Hyponatremia, POA  2. Acute on Chronic Hyponatremia, POA  3. Acute Exacerbation of Chronic Diastolic Heart Failure, POA  4. Right Lower Extremity Hematoma s/p Fall, POA  5. Essential Hypertension  6. Permanent Atrial Fibrillation, not on anticoagulation  7. Hypothyroidism  8. Chronic Dementia  9. Impaired Mobility and ADLs    Plan:    Hyponatremia  Diastolic Heart Failure Exacerbation  Acute Encephalopathy worse than baseline  -Patient's encephalopathy secondary to severe hyponatremia upon arrival. With improvement in sodium level, mentation has returned to baseline.  -Nephrology consulted for hyponatremia; appreciate recommendations  -Strict I&Os  -Fall precautions  -Frequent monitoring of NA- currently 127  -EF 55-60 % with grade 3 Diastolic Dysfunction July 2022     Right LE Hematoma  -Doppler with no evidence of DVT, likely hematoma with recent trauma  -Monitor swelling closely- discussed with RN to outline induration so that improvement can be monitored.  -No c/o pain noted  -Ice to hematoma  -Hgb stable     Chronic--Afib/ Hypertension/ Hypothyroidism/ Debility  -Home medications as reconciled  -PT/OT consulted  -Case Management consulted for discharge planning, patient may need placement as she still lives alone. Severe generalized weakness noted.     DVT Prophylaxis: SCDs  Code Status: DNR/DNI  Diet: Regular/ Cardiac  Discharge Plan: 2-3 days    Eleonora Helton DO  08/16/22  10:13 EDT    Dictated utilizing Dragon dictation.      Electronically signed by Eleonora Helton DO at 08/16/22 1025          Physical Therapy Notes (last 24 hours)      Hilda Lewis, PTA at 08/15/22 1331  Version 1 of 1       Goal Outcome Evaluation:  Plan of Care Reviewed With: patient        Progress: improving  Outcome Evaluation: Pt performed sit <->stand chair to RW x5 reps for brief change and  to change linens on chair. Standing static balance observed with B UE support with SBA and no LOB. Performed B LE ex in sitting AP, LAQ, hip abd, marching 1x10 reps.  Pt amb 50 feet with RW with 2 turns with CGA/occ min A with VC for closer proximity to AD especially during turns and for upright posture as able. Con't with PT POC and progress as tolerated    Electronically signed by Hilda Lewis, PTA at 08/15/22 1331     Hilda Lewis, PTA at 08/15/22 1334  Version 1 of 1         Patient Name: Noa Sutton  : 10/7/1926    MRN: [id number]                              Today's Date: 8/15/2022       Admit Date: 8/10/2022    Visit Dx:     ICD-10-CM ICD-9-CM   1. Hyponatremia  E87.1 276.1   2. Hypervolemia, unspecified hypervolemia type  E87.70 276.69   3. 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)   • Salzmann's nodular degeneration   • Hyponatremia   • Atrial fibrillation (HCC)     Past Medical History:   Diagnosis Date   • Constipation    • Disease of thyroid gland    • Hypertension      Past Surgical History:   Procedure Laterality Date   • CHOLECYSTECTOMY     • THYROID SURGERY        General Information     Row Name 08/15/22 1246          Physical Therapy Time and Intention    Document Type therapy note (daily note)  -CC     Mode of Treatment physical therapy  -CC     Row Name 08/15/22 1246          General Information    Patient Profile Reviewed yes  -CC     Existing Precautions/Restrictions fall  -CC     Row Name 08/15/22 1241          Safety Issues, Functional  Mobility    Safety Issues Affecting Function (Mobility) awareness of need for assistance;insight into deficits/self-awareness;safety precautions follow-through/compliance  -     Impairments Affecting Function (Mobility) balance;strength;motor planning;endurance/activity tolerance  -           User Key  (r) = Recorded By, (t) = Taken By, (c) = Cosigned By    Initials Name Provider Type     Hilda Lewis PTA Physical Therapist Assistant               Mobility     Row Name 08/15/22 1247          Bed Mobility    Bed Mobility supine-sit;sit-supine;rolling right;rolling left  -CC     Rolling Left [place] (Bed Mobility) standby assist;contact guard  -CC     Rolling Right [place] (Bed Mobility) standby assist;contact guard  -CC     Supine-Sit [place] (Bed Mobility) minimum assist (75% patient effort);verbal cues;contact guard  -CC     Sit-Supine [place] (Bed Mobility) minimum assist (75% patient effort);verbal cues;contact guard  -     Assistive Device (Bed Mobility) bed rails  -     Row Name 08/15/22 1247          Sit-Stand Transfer    Sit-Stand [place] (Transfers) contact guard;minimum assist (75% patient effort);verbal cues  -     Assistive Device (Sit-Stand Transfers) walker, front-wheeled  -CC     Row Name 08/15/22 1247          Gait/Stairs (Locomotion)    [place] Level (Gait) contact guard;verbal cues  -     Assistive Device (Gait) walker, front-wheeled  -     Distance in Feet (Gait) 25  -CC     Deviations/Abnormal Patterns (Gait) base of support, narrow;anthony decreased;stride length decreased  -     Bilateral Gait Deviations forward flexed posture  -           User Key  (r) = Recorded By, (t) = Taken By, (c) = Cosigned By    Initials Name Provider Type    Hilda Molina PTA Physical Therapist Assistant               Obj/Interventions     Row Name 08/15/22 1251          Strength Comprehensive (MMT)    General Manual Muscle Testing (MMT) Assessment lower  extremity strength deficits identified  -CC     Comment, General Manual Muscle Testing (MMT) Assessment B LE ex in sitting AP, LAQ, hip abd, marching 1x15 reps.  -CC           User Key  (r) = Recorded By, (t) = Taken By, (c) = Cosigned By    Initials Name Provider Type    CC Hilda Lewis, KENDRA Physical Therapist Assistant               Goals/Plan    No documentation.                Clinical Impression     Row Name 08/15/22 1252          Pain    Pretreatment Pain Rating 0/10 - no pain  -CC     Posttreatment Pain Rating 0/10 - no pain  -CC     Additional Documentation Pain Scale: Numbers Pre/Post-Treatment (Group)  -CC     Row Name 08/15/22 1252          Plan of Care Review    Plan of Care Reviewed With patient  -CC     Progress improving  -     Row Name 08/15/22 1252          Therapy Assessment/Plan (PT)    Patient/Family Therapy Goals Statement (PT) Pt performed bed mobility rolling side to side with CGA/SBA and VC, supine to sit with CGA and bed rail with VC and performed sit <->stand with CGA/min A. Pt amb with RW with CGA and VC for equal step length and upright posture during amb. B LE ex in sitting AP, LAQ, hip abd, marching 1x15 reps. Pt requires increased time to perform tasks. Con't with PT POC and progress as tolerated  -     Row Name 08/15/22 1252          Positioning and Restraints    Pre-Treatment Position in bed  -CC     Post Treatment Position chair  -CC     In Chair reclined;call light within reach;encouraged to call for assist  -CC           User Key  (r) = Recorded By, (t) = Taken By, (c) = Cosigned By    Initials Name Provider Type    Hilda Molina PTA Physical Therapist Assistant               Outcome Measures     Row Name 08/15/22 1321          How much help from another person do you currently need...    Turning from your back to your side while in flat bed without using bedrails? 3  -CC     Moving from lying on back to sitting on the side of a flat bed without bedrails? 3  -CC      Moving to and from a bed to a chair (including a wheelchair)? 3  -CC     Standing up from a chair using your arms (e.g., wheelchair, bedside chair)? 3  -CC     Climbing 3-5 steps with a railing? 2  -CC     To walk in hospital room? 3  -CC     AM-PAC 6 Clicks Score (PT) 17  -CC     Highest level of mobility 5 --> Static standing  -CC     Row Name 08/15/22 1321          Functional Assessment    Outcome Measure Options AM-PAC 6 Clicks Basic Mobility (PT)  -CC           User Key  (r) = Recorded By, (t) = Taken By, (c) = Cosigned By    Initials Name Provider Type    CC Hilda Lewis PTA Physical Therapist Assistant                             Physical Therapy Education                 Title: PT OT SLP Therapies (In Progress)     Topic: Physical Therapy (In Progress)     Point: Mobility training (Done)     Learning Progress Summary           Patient Acceptance, E,TB, VU by  at 8/15/2022 1329    Comment: Importance of increasing mobility daily    Acceptance, E,TB, VU by  at 8/13/2022 1627    Comment: Importance of increasing mob    Acceptance, E, VU by MS at 8/11/2022 1343    Comment: importance of mobility                   Point: Home exercise program (Done)     Learning Progress Summary           Patient Acceptance, E, VU by MS at 8/11/2022 1343    Comment: importance of mobility                   Point: Body mechanics (Done)     Learning Progress Summary           Patient Acceptance, E,TB, VU by  at 8/14/2022 1328    Comment: increase upright posture as able during amb                   Point: Precautions (Not Started)     Learner Progress:  Not documented in this visit.                      User Key     Initials Effective Dates Name Provider Type Discipline     06/16/21 -  Hilda Lewis PTA Physical Therapist Assistant PT    MS 07/01/22 -  Jorden Rausch PT Physical Therapist PT              PT Recommendation and Plan     Plan of Care Reviewed With: patient  Progress: improving  Outcome  Evaluation: Pt performed sit <->stand chair to RW x5 reps for brief change and to change linens on chair. Standing static balance observed with B UE support with SBA and no LOB. Performed B LE ex in sitting AP, LAQ, hip abd, marching 1x10 reps.  Pt amb 50 feet with RW with 2 turns with CGA/occ min A with VC for closer proximity to AD especially during turns and for upright posture as able. Con't with PT POC and progress as tolerated     Time Calculation:    PT Charges     Row Name 08/15/22 1331             Time Calculation    PT Received On 08/15/22  -      PT Goal Re-Cert Due Date 08/21/22  -CC              Time Calculation- PT    Total Timed Code Minutes- PT 44 minute(s)  -CC              Timed Charges    85815 - PT Therapeutic Exercise Minutes 15  -CC      64442 - Gait Training Minutes  14  -CC      59387 - PT Therapeutic Activity Minutes 15  -CC              Total Minutes    Timed Charges Total Minutes 44  -CC       Total Minutes 44  -CC            User Key  (r) = Recorded By, (t) = Taken By, (c) = Cosigned By    Initials Name Provider Type    CC Hilda Lewis PTA Physical Therapist Assistant              Therapy Charges for Today     Code Description Service Date Service Provider Modifiers Qty    92744613484 HC PT THER PROC EA 15 MIN 8/14/2022 Hilda Lewis PTA GP 1    15242485055 HC GAIT TRAINING EA 15 MIN 8/14/2022 Hilda Lewis, KENDRA GP 1    71839591042 HC PT THERAPEUTIC ACT EA 15 MIN 8/14/2022 Hilda Lewis, KNEDRA GP 1    27068740688 HC PT THER PROC EA 15 MIN 8/15/2022 Hilda Lewis, KENDRA GP 1    05935395081 HC GAIT TRAINING EA 15 MIN 8/15/2022 Hilda Lewis, KENDRA GP 1    41526059835 HC PT THERAPEUTIC ACT EA 15 MIN 8/15/2022 Hilda Lewis, KENDRA GP 1          PT G-Codes  Outcome Measure Options: AM-PAC 6 Clicks Basic Mobility (PT)  AM-PAC 6 Clicks Score (PT): 17  AM-PAC 6 Clicks Score (OT): 15    Hilda Lewis PTA  8/15/2022      Electronically signed by Hilda Lewis PTA  at 08/15/22 1334          Occupational Therapy Notes (last 24 hours)      Savanah Jolley OT at 08/15/22 1601          Patient Name: Noa Sutton  : 10/7/1926    MRN: [id number]                              Today's Date: 8/15/2022       Admit Date: 8/10/2022    Visit Dx:     ICD-10-CM ICD-9-CM   1. Hyponatremia  E87.1 276.1   2. Hypervolemia, unspecified hypervolemia type  E87.70 276.69   3. 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)   • Salzmann's nodular degeneration   • Hyponatremia   • Atrial fibrillation (HCC)     Past Medical History:   Diagnosis Date   • Constipation    • Disease of thyroid gland    • Hypertension      Past Surgical History:   Procedure Laterality Date   • CHOLECYSTECTOMY     • THYROID SURGERY        General Information     Row Name 08/15/22 1550          OT Time and Intention    Document Type therapy note (daily note)  -SD     Mode of Treatment occupational therapy  -SD     Row Name 08/15/22 4948          General Information    Patient Profile Reviewed yes  -SD           User Key  (r) = Recorded By, (t) = Taken By, (c) = Cosigned By    Initials Name Provider Type    SD Savanah Jolley OT Occupational Therapist                 Mobility/ADL's     Row Name 08/15/22 1597          Bed Mobility    Comment, (Bed Mobility) in chair  -SD     Row Name 08/15/22 3123          Transfers    Transfers sit-stand transfer;toilet transfer  -SD     Sit-Stand [place] (Transfers) minimum assist (75% patient effort);contact guard  -SD     [place] Level  (Toilet Transfer) minimum assist (75% patient effort);contact guard  -SD     Assistive Device (Toilet Transfer) commode;grab bars/safety frame  -SD     Row Name 08/15/22 1550          Sit-Stand Transfer    Assistive Device (Sit-Stand Transfers) walker, front-wheeled  -SD     Row Name 08/15/22 1550          Toilet Transfer    Type (Toilet Transfer) sit-stand;stand-sit  -SD     Row Name 08/15/22 1550          Functional Mobility    Functional Mobility- Ind. Level contact guard assist  -SD     Functional Mobility- Device walker, front-wheeled  -SD     Functional Mobility-Distance (Feet) 46  23' x 2  -SD     Row Name 08/15/22 1550          Upper Body Dressing Assessment/Training    [place] Level (Upper Body Dressing) don;pajama/robe;set up  -SD     Row Name 08/15/22 1550          Grooming Assessment/Training    [place] Level (Grooming) hair care, combing/brushing;oral care regimen;wash face, hands;set up  -SD     Position (Grooming) supported sitting  -SD     Row Name 08/15/22 1550          Toileting Assessment/Training    [place] Level (Toileting) minimum assist (75% patient effort)  -SD     Assistive Devices (Toileting) commode;grab bar/safety frame  -SD           User Key  (r) = Recorded By, (t) = Taken By, (c) = Cosigned By    Initials Name Provider Type    Savanah Echevarria OT Occupational Therapist               Obj/Interventions     Row Name 08/15/22 1557          Shoulder (Therapeutic Exercise)    Shoulder (Therapeutic Exercise) AROM (active range of motion)  -SD     Shoulder AROM (Therapeutic Exercise) bilateral;flexion;extension;10 repetitions  -SD     Row Name 08/15/22 1557          Elbow/Forearm (Therapeutic Exercise)    Elbow/Forearm (Therapeutic Exercise) AROM (active range of motion)  -SD     Elbow/Forearm AROM (Therapeutic Exercise) bilateral;flexion;extension;10 repetitions  -SD     Row Name 08/15/22 1557          Wrist (Therapeutic Exercise)    Wrist (Therapeutic Exercise) AROM  (active range of motion)  -SD     Wrist AROM (Therapeutic Exercise) bilateral;flexion;extension;10 repetitions  -SD     Row Name 08/15/22 1557          Hand (Therapeutic Exercise)    Hand (Therapeutic Exercise) AROM (active range of motion)  -SD     Hand AROM/AAROM (Therapeutic Exercise) bilateral;AROM (active range of motion);10 repetitions  -SD     Row Name 08/15/22 1557          Motor Skills    Therapeutic Exercise shoulder;elbow/forearm;wrist;hand  -SD           User Key  (r) = Recorded By, (t) = Taken By, (c) = Cosigned By    Initials Name Provider Type    Savanah Echevarria OT Occupational Therapist               Goals/Plan    No documentation.                Clinical Impression     Row Name 08/15/22 1557          Pain Assessment    Pretreatment Pain Rating 0/10 - no pain  -SD     Posttreatment Pain Rating 0/10 - no pain  -SD     Row Name 08/15/22 1557          Plan of Care Review    Plan of Care Reviewed With patient  -SD     Progress improving  -SD     Outcome Evaluation OT tx completed. Patient sitting in chair, completed sit to stand and functional mobility 23' x 2 using RW with CGA-Min A; completed toileting task with min A. Sitting in chair completed gown change, grooming tasks with S/U-SBA followed by UB AROM. Continue OT POC  -SD     Row Name 08/15/22 1215          Vital Signs    O2 Delivery Pre Treatment room air  -SD     O2 Delivery Intra Treatment room air  -SD     O2 Delivery Post Treatment room air  -SD     Row Name 08/15/22 0219          Positioning and Restraints    Pre-Treatment Position sitting in chair/recliner  -SD     Post Treatment Position chair  -SD     In Chair reclined;call light within reach;encouraged to call for assist;exit alarm on;with family/caregiver  -SD           User Key  (r) = Recorded By, (t) = Taken By, (c) = Cosigned By    Initials Name Provider Type    Savanah Echevarria OT Occupational Therapist               Outcome Measures     Row Name 08/15/22 4502          How  much help from another is currently needed...    Putting on and taking off regular lower body clothing? 2  -SD     Bathing (including washing, rinsing, and drying) 2  -SD     Toileting (which includes using toilet bed pan or urinal) 2  -SD     Putting on and taking off regular upper body clothing 3  -SD     Taking care of personal grooming (such as brushing teeth) 3  -SD     Eating meals 3  -SD     AM-PAC 6 Clicks Score (OT) 15  -SD     Row Name 08/15/22 1321          How much help from another person do you currently need...    Turning from your back to your side while in flat bed without using bedrails? 3  -CC     Moving from lying on back to sitting on the side of a flat bed without bedrails? 3  -CC     Moving to and from a bed to a chair (including a wheelchair)? 3  -CC     Standing up from a chair using your arms (e.g., wheelchair, bedside chair)? 3  -CC     Climbing 3-5 steps with a railing? 2  -CC     To walk in hospital room? 3  -CC     AM-PAC 6 Clicks Score (PT) 17  -CC     Highest level of mobility 5 --> Static standing  -CC     Row Name 08/15/22 1559 08/15/22 1321       Functional Assessment    Outcome Measure Options AM-PAC 6 Clicks Daily Activity (OT)  -SD AM-PAC 6 Clicks Basic Mobility (PT)  -CC          User Key  (r) = Recorded By, (t) = Taken By, (c) = Cosigned By    Initials Name Provider Type    CC Hilda Lewis PTA Physical Therapist Assistant    Savanah Echevarria, OT Occupational Therapist                Occupational Therapy Education                 Title: PT OT SLP Therapies (In Progress)     Topic: Occupational Therapy (In Progress)     Point: ADL training (Done)     Description:   Instruct learner(s) on proper safety adaptation and remediation techniques during self care or transfers.   Instruct in proper use of assistive devices.              Learning Progress Summary           Patient Acceptance, E,TB, VU by SD at 8/12/2022 1216    Comment: Safety and sequencing during functional tf  and mobility    Acceptance, E,TB, VU by  at 8/11/2022 1621    Comment: Role of OT/POC                   Point: Home exercise program (Done)     Description:   Instruct learner(s) on appropriate technique for monitoring, assisting and/or progressing therapeutic exercises/activities.              Learning Progress Summary           Patient Acceptance, E,TB, VU by SD at 8/15/2022 5909    Comment: Benefit of UB ROM                   Point: Precautions (Not Started)     Description:   Instruct learner(s) on prescribed precautions during self-care and functional transfers.              Learner Progress:  Not documented in this visit.          Point: Body mechanics (Not Started)     Description:   Instruct learner(s) on proper positioning and spine alignment during self-care, functional mobility activities and/or exercises.              Learner Progress:  Not documented in this visit.                      User Key     Initials Effective Dates Name Provider Type Discipline     06/16/21 -  Brandie Lazaro Occupational Therapist OT    SD 06/16/21 -  Savanah Jolley OT Occupational Therapist OT              OT Recommendation and Plan     Plan of Care Review  Plan of Care Reviewed With: patient  Progress: improving  Outcome Evaluation: OT tx completed. Patient sitting in chair, completed sit to stand and functional mobility 23' x 2 using RW with CGA-Min A; completed toileting task with min A. Sitting in chair completed gown change, grooming tasks with S/U-SBA followed by UB AROM. Continue OT POC     Time Calculation:    Time Calculation- OT     Row Name 08/15/22 1600 08/15/22 1331          Time Calculation- OT    OT Start Time 1340  -SD --     OT Stop Time 1407  -SD --     OT Time Calculation (min) 27 min  -SD --     OT Received On 08/15/22  -SD --     OT Goal Re-Cert Due Date 08/21/22  -SD --            Timed Charges    36610 - OT Therapeutic Exercise Minutes 10  -SD --     44989 - Gait Training Minutes  -- 14  -CC      97455 - OT Therapeutic Activity Minutes 5  -SD --     06172 - OT Self Care/Mgmt Minutes 12  -SD --            Total Minutes    Timed Charges Total Minutes 27  -SD 14  -CC      Total Minutes 27  -SD 14  -CC           User Key  (r) = Recorded By, (t) = Taken By, (c) = Cosigned By    Initials Name Provider Type    CC Hilda Lewis, PTA Physical Therapist Assistant    SD Savanah Jolley OT Occupational Therapist              Therapy Charges for Today     Code Description Service Date Service Provider Modifiers Qty    23671429117  OT THER PROC EA 15 MIN 8/15/2022 Savanah Jolley OT GO 1    53569027104  OT SELF CARE/MGMT/TRAIN EA 15 MIN 8/15/2022 Savanah Jolley OT GO 1               Savanah Jolley OT  8/15/2022    Electronically signed by Savanah Jolley OT at 08/15/22 2911     Savanah Jolley OT at 08/15/22 1915        Goal Outcome Evaluation:  Plan of Care Reviewed With: patient        Progress: improving  Outcome Evaluation: OT tx completed. Patient sitting in chair, completed sit to stand and functional mobility 23' x 2 using RW with CGA-Min A; completed toileting task with min A. Sitting in chair completed gown change, grooming tasks with S/U-SBA followed by UB AROM. Continue OT POC    Electronically signed by Savanah Jolley OT at 08/15/22 0358

## 2022-08-16 NOTE — THERAPY TREATMENT NOTE
Attempted OT tx at 1140 and again at 1408, patient is more lethargic this date and requested OT tx be held. Will follow up tomorrow as appropriate.

## 2022-08-16 NOTE — PLAN OF CARE
Goal Outcome Evaluation:  Plan of Care Reviewed With: son   No reports of pain.  Up to chair several times today.  Vitals unremarkable.  No acute events this shift.

## 2022-08-16 NOTE — PROGRESS NOTES
[facility] Progress Note      Patient Name: Noa Sutton  : 10/7/1926  MRN: [id number]  Primary Care Physician:  Matt Blake MD  Date of admission: 8/10/2022    Subjective     Interval History:   Follow-up on hyponatremia  Patient remains weak  Still with some lower extremity swelling  Had 2.8 L urine output charted yesterday and 0.7 L of urine output charted since midnight with the aid of Lasix 40 mg IV twice daily    Review of Systems:   As noted above    Objective     Vitals:   Temp:  [96.6 °F (35.9 °C)-98.1 °F (36.7 °C)] 98 °F (36.7 °C)  Heart Rate:  [81-90] 84  Resp:  [17-18] 18  BP: (108-139)/() 121/78  Flow (L/min):  [1] 1    Intake/Output Summary (Last 24 hours) at 2022 1119  Last data filed at 2022 0900  Gross per 24 hour   Intake 780 ml   Output 2100 ml   Net -1320 ml       Physical Exam:    General Appearance: NAD  HEENT: oral mucosa normal, nonicteric sclera  Neck: supple, no JVD  Lungs: Bibasilar crackles  Heart: RRR, normal S1 and S2  Abdomen: soft, nondistended  Extremities: Mild pretibial edema bilaterally  Neuro: Awake alert and moving all extremities    Scheduled Meds:     furosemide, 40 mg, Intravenous, BID  levothyroxine, 88 mcg, Oral, Daily  metoprolol succinate XL, 100 mg, Oral, Q24H  pantoprazole, 40 mg, Oral, QAM  senna-docusate sodium, 2 tablet, Oral, BID  sodium chloride, 10 mL, Intravenous, Q12H      IV Meds:        Results Reviewed:   I have personally reviewed the results from the time of this admission to 2022 11:19 EDT     Results from last 7 days   Lab Units 22  0627 08/15/22  0551 22  1649 08/10/22  1749 08/10/22  1150   SODIUM mmol/L 127* 125* 127*   < > 114*   POTASSIUM mmol/L 4.1 4.0 4.1   < > 4.6   CHLORIDE mmol/L 79* 78* 81*   < > 74*   CO2 mmol/L 41.1* 44.3* 39.6*   < > 28.8   BUN mg/dL 17 16 16   < > 11   CREATININE mg/dL 0.57 0.60 0.64   < > 0.50*   CALCIUM mg/dL 8.3 8.3 8.6   < > 9.0   BILIRUBIN  mg/dL  --   --   --   --  1.7*   ALK PHOS U/L  --   --   --   --  108   ALT (SGPT) U/L  --   --   --   --  34*   AST (SGOT) U/L  --   --   --   --  34*   GLUCOSE mg/dL 87 92 98   < > 120*    < > = values in this interval not displayed.     Estimated Creatinine Clearance: 55.4 mL/min (by C-G formula based on SCr of 0.57 mg/dL).  Results from last 7 days   Lab Units 08/16/22  0627 08/15/22  0551 08/14/22  1649 08/14/22  0457   MAGNESIUM mg/dL  --  1.7  --  1.5*   PHOSPHORUS mg/dL 3.7 3.1 3.5 3.3     Results from last 7 days   Lab Units 08/11/22  0610   URIC ACID mg/dL 2.4     Results from last 7 days   Lab Units 08/13/22  0411 08/12/22  0405 08/11/22  0610 08/10/22  1150   WBC 10*3/mm3 11.77* 10.55 8.44 10.27   HEMOGLOBIN g/dL 12.8 12.4 11.4* 12.2   PLATELETS 10*3/mm3 268 249 281 382           Assessment / Plan     ASSESSMENT:  -Hyponatremia, Hypervolemic  likely in light of fluid overload state.  This appears to be compounded by poor solute intake ie ' tea and toast diet'  -Acute on chronic diastolic congestive heart failure with fluid overload state, underlying EF around 55 to 60%  -Hypothyroidism  -Hypertension    PLAN:  -Increase Lasix to 40 mg IV 3 times daily from twice daily  -Maintain protein intake above 0.8 g/kg/day  -Fluid restriction to less than 1.2 L/day  -Continue to hold lisinopril  -Renal panel in serena Perez MD  08/16/22  11:19 EDT    [facility]  [phone]

## 2022-08-16 NOTE — PLAN OF CARE
Goal Outcome Evaluation:  Plan of Care Reviewed With: patient        Progress: no change  Outcome Evaluation: Pt recieved supine in bed and extremly sleepy. Pt had to be  awakened several times prior to sitting on EOB.  Pt required min a of all mobility including 3' bed to chair with rw.  Pt also sat EOB for approx 10 minutes. See flowsheet for details.

## 2022-08-16 NOTE — THERAPY TREATMENT NOTE
Patient Name: Noa Sutton  : 10/7/1926    MRN: [id number]                              Today's Date: 2022       Admit Date: 8/10/2022    Visit Dx:     ICD-10-CM ICD-9-CM   1. Hyponatremia  E87.1 276.1   2. Hypervolemia, unspecified hypervolemia type  E87.70 276.69   3. 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)   • Salzmann's nodular degeneration   • Hyponatremia   • Atrial fibrillation (HCC)   • Metabolic encephalopathy   • Moderate malnutrition (HCC)     Past Medical History:   Diagnosis Date   • Constipation    • Disease of thyroid gland    • Hypertension      Past Surgical History:   Procedure Laterality Date   • CHOLECYSTECTOMY     • THYROID SURGERY        General Information     Row Name 22 1145          Physical Therapy Time and Intention    Document Type therapy note (daily note)  -RM     Mode of Treatment physical therapy  -RM     Row Name 22 1145          General Information    Patient Profile Reviewed yes  -RM     Existing Precautions/Restrictions fall  -RM     Row Name 22 1145          Cognition    Orientation Status (Cognition) oriented x 4;verbal cues/prompts needed for orientation  -RM     Row Name 22 1145          Safety Issues, Functional Mobility    Safety Issues Affecting Function (Mobility) insight into deficits/self-awareness;positioning of assistive device;safety precautions follow-through/compliance;sequencing abilities  -RM     Impairments Affecting Function (Mobility) balance;strength;motor planning;endurance/activity  tolerance  -RM           User Key  (r) = Recorded By, (t) = Taken By, (c) = Cosigned By    Initials Name Provider Type    Jarret Arroyo, KENDRA Physical Therapist Assistant               Mobility     Row Name 08/16/22 1146          Bed Mobility    Rolling Right [place] (Bed Mobility) minimum assist (75% patient effort);verbal cues  -RM     Supine-Sit [place] (Bed Mobility) minimum assist (75% patient effort);verbal cues;contact guard  -RM     Assistive Device (Bed Mobility) bed rails;draw sheet  -RM     Row Name 08/16/22 1146          Sit-Stand Transfer    Sit-Stand [place] (Transfers) minimum assist (75% patient effort);verbal cues  -RM     Assistive Device (Sit-Stand Transfers) walker, front-wheeled  -RM     Row Name 08/16/22 1146          Gait/Stairs (Locomotion)    [place] Level (Gait) minimum assist (75% patient effort);verbal cues  -RM     Assistive Device (Gait) walker, front-wheeled  -RM     Distance in Feet (Gait) 3'  -RM     Deviations/Abnormal Patterns (Gait) base of support, narrow;anthony decreased;stride length decreased;festinating/shuffling  -RM     Bilateral Gait Deviations forward flexed posture  -RM           User Key  (r) = Recorded By, (t) = Taken By, (c) = Cosigned By    Initials Name Provider Type    Jarret Arroyo, KENDRA Physical Therapist Assistant               Obj/Interventions    No documentation.                Goals/Plan    No documentation.                Clinical Impression     Row Name 08/16/22 1148          Pain    Pretreatment Pain Rating 0/10 - no pain  -RM     Posttreatment Pain Rating 0/10 - no pain  -RM     Row Name 08/16/22 1148          Plan of Care Review    Plan of Care Reviewed With patient  -RM     Progress no change  -RM     Outcome Evaluation Pt recieved supine in bed and extremly sleepy. Pt had to be  awakened several times prior to sitting on EOB.  Pt required min a of all mobility including 3' bed to chair with rw.  Pt also sat EOB for  approx 10 minutes. See flowsheet for details.  -RM     Row Name 08/16/22 1148          Positioning and Restraints    Pre-Treatment Position in bed  -RM     Post Treatment Position chair  -RM     In Chair reclined;call light within reach;encouraged to call for assist;exit alarm on;with family/caregiver;notified nsg  -RM           User Key  (r) = Recorded By, (t) = Taken By, (c) = Cosigned By    Initials Name Provider Type    Jarret Arroyo, KENDRA Physical Therapist Assistant               Outcome Measures     Row Name 08/16/22 1152          How much help from another person do you currently need...    Turning from your back to your side while in flat bed without using bedrails? 3  -RM     Moving from lying on back to sitting on the side of a flat bed without bedrails? 3  -RM     Moving to and from a bed to a chair (including a wheelchair)? 3  -RM     Standing up from a chair using your arms (e.g., wheelchair, bedside chair)? 3  -RM     Climbing 3-5 steps with a railing? 2  -RM     To walk in hospital room? 3  -RM     AM-PAC 6 Clicks Score (PT) 17  -RM     Highest level of mobility 5 --> Static standing  -RM     Row Name 08/16/22 1152          Functional Assessment    Outcome Measure Options AM-PAC 6 Clicks Basic Mobility (PT)  -           User Key  (r) = Recorded By, (t) = Taken By, (c) = Cosigned By    Initials Name Provider Type    Jarret Arroyo, KENDRA Physical Therapist Assistant                             Physical Therapy Education                 Title: PT OT SLP Therapies (In Progress)     Topic: Physical Therapy (In Progress)     Point: Mobility training (Done)     Learning Progress Summary           Patient Acceptance, E,TB,D, VU,NR by  at 8/16/2022 1153    Comment: sequencing for mobility    Acceptance, E,TB, VU by  at 8/15/2022 1329    Comment: Importance of increasing mobility daily    Acceptance, E,TB, VU by CC at 8/13/2022 1627    Comment: Importance of increasing mob    Acceptance, E, VU  by MS at 8/11/2022 1343    Comment: importance of mobility                   Point: Home exercise program (Done)     Learning Progress Summary           Patient Acceptance, E, VU by MS at 8/11/2022 1343    Comment: importance of mobility                   Point: Body mechanics (Done)     Learning Progress Summary           Patient Acceptance, E,TB, VU by CC at 8/14/2022 1328    Comment: increase upright posture as able during amb                   Point: Precautions (Not Started)     Learner Progress:  Not documented in this visit.                      User Key     Initials Effective Dates Name Provider Type Discipline     06/16/21 -  Hilda Lewis, PTA Physical Therapist Assistant PT     06/16/21 -  Jarret Mcwilliams, KENDRA Physical Therapist Assistant PT    MS 07/01/22 -  Jorden Rausch, PT Physical Therapist PT              PT Recommendation and Plan     Plan of Care Reviewed With: patient  Progress: no change  Outcome Evaluation: Pt recieved supine in bed and extremly sleepy. Pt had to be  awakened several times prior to sitting on EOB.  Pt required min a of all mobility including 3' bed to chair with rw.  Pt also sat EOB for approx 10 minutes. See flowsheet for details.     Time Calculation:    PT Charges     Row Name 08/16/22 1154             Time Calculation    Start Time 1039  -RM      Stop Time 1056  -RM      Time Calculation (min) 17 min  -RM      PT Received On 08/16/22  -RM      PT Goal Re-Cert Due Date 08/21/22  -RM              Time Calculation- PT    Total Timed Code Minutes- PT 17 minute(s)  -RM              Timed Charges    24262 - PT Therapeutic Activity Minutes 17  -RM              Total Minutes    Timed Charges Total Minutes 17  -RM       Total Minutes 17  -RM            User Key  (r) = Recorded By, (t) = Taken By, (c) = Cosigned By    Initials Name Provider Type     Jarret Mcwilliams, PTA Physical Therapist Assistant              Therapy Charges for Today     Code Description Service  Date Service Provider Modifiers Qty    [id number]  PT THERAPEUTIC ACT EA 15 MIN 8/16/2022 Jarret Mcwilliams, PTA GP 1          PT G-Codes  Outcome Measure Options: AM-PAC 6 Clicks Basic Mobility (PT)  AM-PAC 6 Clicks Score (PT): 17  AM-PAC 6 Clicks Score (OT): 15    Jarret Mcwilliams PTA  8/16/2022

## 2022-08-16 NOTE — PROGRESS NOTES
"    [facility]    Name:  Noa Sutton   Age:  89 y.o.  Sex:  female  :  10/7/1926  MRN:  [id number]   Visit Number:  [id number]  Admission Date:  8/10/2022  Date Of Service:  22  Primary Care Physician:  Matt Blake MD     LOS: 6 days :    Chief Complaint:      Altered Mental Status    Subjective:    Patient seen and examined at bedside this morning. Chart reviewed and previous provided documentation noted. Patient states that she is \"doing pretty good.\" She denies any chest pain or palpitations. She ate the entirety of her breakfast and had no complaints. No family at bedside during examination.    Hospital Course:    \"Patient is a 89 year old female who presents to the emergency department today with son who complained of altered mental status worsening from baseline. Patient with recent admission (2022) for hyponatremia, Diastolic heart failure, and Atrial Fibrillation with RVR after a mechanical fall at home. Nephrology and Cardiology were consulted with previous admission.  Patient was discharged home on Lasix 20mg every other day and transitioned to Metoprolol XL after gentle diuresis and sodium stabilizing.  Patient followed up with her PCP (Hayden) who increased her Metoprolol and stopped her Lasix.  Son wanted short term rehab during last hospitalization but patient did not qualify. Patient went home with caregivers coming to her home.  Son states that after last admission there were not around the clock caregivers present and that he had been staying with his mother off and on.  Advises he had been looking at private pay assisted living, although patient would not currently qualify for assisted living.     Work up in the emergency department noted proBNP-2821, sodium-114, chloride-74, creatinine-0.50, and CBC grossly unremarkable. CXR was done and noted cardiomegaly, bilateral diffuse pulmonary opacities, most likely edema and " "atelectasis, secondary to congestive heart failure, superimposed airspace disease cannot be excluded.  Urinalysis was without any acute infection.  CT of head was obtained and without any acute process.     Patient was diuresed with Lasix and continued on frequent BMP checks with Nephrology following.  Patient with noted weakness, physical and occupational therapy consulted as well as case management for discharge planning. RLE hematoma most likely related to recent fall and trauma.\"    Review of Systems:     All systems were reviewed and negative except as mentioned in subjective, assessment and plan.    Vital Signs:    Temp:  [96.6 °F (35.9 °C)-98.1 °F (36.7 °C)] 98 °F (36.7 °C)  Heart Rate:  [81-90] 84  Resp:  [17-18] 18  BP: (108-139)/() 121/78    Intake and output:    I/O last 3 completed shifts:  In: 720 [P.O.:720]  Out: 3500 [Urine:3500]  I/O this shift:  In: 240 [P.O.:240]  Out: -     Physical Examination:    General Appearance:  Alert and cooperative. Sitting in bed, in no acute distress. Frail.   Head:  Atraumatic and normocephalic.   Eyes: Conjunctivae and sclerae normal, no icterus.            Lungs:   Breath sounds heard bilaterally equally.  No wheezing or crackles.    Heart:  Normal S1 and S2, no murmur, no gallop, no rub.   Abdomen:   Normal bowel sounds, no masses, no organomegaly. Soft, nontender, nondistended, no rebound tenderness.   Extremities: Supple, Right lower extremity with extensive ecchymosis, improved. no edema, no cyanosis, no clubbing.   Skin: No bleeding or rash.   Neurologic: Alert and oriented x 3. No facial asymmetry. Moves all four limbs. Generalized weakness.      Laboratory results:    Results from last 7 days   Lab Units 08/16/22  0627 08/15/22  0551 08/14/22  1649 08/10/22  1749 08/10/22  1150   SODIUM mmol/L 127* 125* 127*   < > 114*   POTASSIUM mmol/L 4.1 4.0 4.1   < > 4.6   CHLORIDE mmol/L 79* 78* 81*   < > 74*   CO2 mmol/L 41.1* 44.3* 39.6*   < > 28.8   BUN mg/dL 17 " 16 16   < > 11   CREATININE mg/dL 0.57 0.60 0.64   < > 0.50*   CALCIUM mg/dL 8.3 8.3 8.6   < > 9.0   BILIRUBIN mg/dL  --   --   --   --  1.7*   ALK PHOS U/L  --   --   --   --  108   ALT (SGPT) U/L  --   --   --   --  34*   AST (SGOT) U/L  --   --   --   --  34*   GLUCOSE mg/dL 87 92 98   < > 120*    < > = values in this interval not displayed.     Results from last 7 days   Lab Units 08/13/22  0411 08/12/22  0405 08/11/22  0610   WBC 10*3/mm3 11.77* 10.55 8.44   HEMOGLOBIN g/dL 12.8 12.4 11.4*   HEMATOCRIT % 37.0 34.8 31.8*   PLATELETS 10*3/mm3 268 249 281         Results from last 7 days   Lab Units 08/10/22  1150   TROPONIN T ng/mL <0.010             I have reviewed the patient's laboratory results.    Radiology results:    No radiology results from the last 24 hrs  I have reviewed the patient's radiology reports.    Medication Review:     I have reviewed the patient's active and prn medications.     Problem List:      Hyponatremia      Assessment:    1. Acute Metabolic Encephalopathy secondary to Hyponatremia, POA  2. Acute on Chronic Hyponatremia, POA  3. Acute Exacerbation of Chronic Diastolic Heart Failure, POA  4. Right Lower Extremity Hematoma s/p Fall, POA  5. Essential Hypertension  6. Permanent Atrial Fibrillation, not on anticoagulation  7. Hypothyroidism  8. Chronic Dementia  9. Impaired Mobility and ADLs    Plan:    Hyponatremia  Diastolic Heart Failure Exacerbation  Acute Encephalopathy worse than baseline  -Patient's encephalopathy secondary to severe hyponatremia upon arrival. With improvement in sodium level, mentation has returned to baseline.  -Nephrology consulted for hyponatremia; appreciate recommendations  -Strict I&Os  -Fall precautions  -Frequent monitoring of NA- currently 127  -EF 55-60 % with grade 3 Diastolic Dysfunction July 2022     Right LE Hematoma  -Doppler with no evidence of DVT, likely hematoma with recent trauma  -Monitor swelling closely- discussed with RN to outline  induration so that improvement can be monitored.  -No c/o pain noted  -Ice to hematoma  -Hgb stable     Chronic--Afib/ Hypertension/ Hypothyroidism/ Debility  -Home medications as reconciled  -PT/OT consulted  -Case Management consulted for discharge planning, patient may need placement as she still lives alone. Severe generalized weakness noted.     DVT Prophylaxis: SCDs  Code Status: DNR/DNI  Diet: Regular/ Cardiac  Discharge Plan: 2-3 days    Eleonora Helton DO  08/16/22  10:13 EDT    Dictated utilizing Dragon dictation.

## 2022-08-16 NOTE — PLAN OF CARE
Problem: Adult Inpatient Plan of Care  Goal: Plan of Care Review  Outcome: Ongoing, Progressing  Goal: Patient-Specific Goal (Individualized)  Outcome: Ongoing, Progressing  Goal: Absence of Hospital-Acquired Illness or Injury  Outcome: Ongoing, Progressing  Intervention: Identify and Manage Fall Risk  Recent Flowsheet Documentation  Taken 8/16/2022 0000 by Zaid Gomes RN  Safety Promotion/Fall Prevention: safety round/check completed  Taken 8/15/2022 2200 by Zaid Gomes RN  Safety Promotion/Fall Prevention: safety round/check completed  Taken 8/15/2022 2010 by Zaid Gomes RN  Safety Promotion/Fall Prevention: safety round/check completed  Intervention: Prevent Skin Injury  Recent Flowsheet Documentation  Taken 8/16/2022 0000 by Zaid Gomes RN  Body Position: weight shifting  Taken 8/15/2022 2200 by Zaid Gomes RN  Body Position: weight shifting  Taken 8/15/2022 2010 by Zaid Gomes RN  Body Position: weight shifting  Intervention: Prevent and Manage VTE (Venous Thromboembolism) Risk  Recent Flowsheet Documentation  Taken 8/15/2022 2010 by Zaid Gomes RN  Activity Management: back to bed  Goal: Optimal Comfort and Wellbeing  Outcome: Ongoing, Progressing  Goal: Readiness for Transition of Care  Outcome: Ongoing, Progressing     Problem: Fluid Volume Excess  Goal: Fluid Balance  Outcome: Ongoing, Progressing     Problem: Electrolyte Imbalance  Goal: Electrolyte Balance  Outcome: Ongoing, Progressing     Problem: Skin Injury Risk Increased  Goal: Skin Health and Integrity  Outcome: Ongoing, Progressing     Problem: Fall Injury Risk  Goal: Absence of Fall and Fall-Related Injury  Outcome: Ongoing, Progressing  Intervention: Promote Injury-Free Environment  Recent Flowsheet Documentation  Taken 8/16/2022 0000 by Zaid Gomes RN  Safety Promotion/Fall Prevention: safety round/check completed  Taken 8/15/2022 2200 by Zaid Gomes RN  Safety Promotion/Fall Prevention: safety round/check  completed  Taken 8/15/2022 2010 by Zaid Gomes, RN  Safety Promotion/Fall Prevention: safety round/check completed   Goal Outcome Evaluation:

## 2022-08-16 NOTE — CASE MANAGEMENT/SOCIAL WORK
Discharge Planning Assessment  [facility]     Patient Name: Noa Sutton  MRN: [id number]  Today's Date: 8/16/2022    Admit Date: 8/10/2022     Discharge Needs Assessment    No documentation.                Discharge Plan     Row Name 08/16/22 1438       Plan    Plan Comments Left several messages for Anais at the [facility] and have not recieved a call back.  Sent  referrals to [facility], [facility], and [place]              Continued Care and Services - Admitted Since 8/10/2022     Destination     Service Provider Request Status Selected Services Address Phone Fax Patient Preferred    [facility]  Pending - Request Sent N/A [address] [address] [phone] --    [facility]  Pending - Request Sent N/A [address] [facility] [phone] [phone] --    [facility] [place]  Pending - Request Sent N/A [address] [address] [phone] --    [place] [facility]  Pending - Request Sent N/A [address] [address] [phone] --                 Demographic Summary    No documentation.                Functional Status    No documentation.                Psychosocial    No documentation.                Abuse/Neglect    No documentation.                Legal    No documentation.                Substance Abuse    No documentation.                Patient Forms    No documentation.                   Elsy Duran RN

## 2022-08-17 LAB
ALBUMIN SERPL-MCNC: 3.3 G/DL (ref 3.5–5.2)
ANION GAP SERPL CALCULATED.3IONS-SCNC: 6.4 MMOL/L (ref 5–15)
BASOPHILS # BLD AUTO: 0.02 10*3/MM3 (ref 0–0.2)
BASOPHILS NFR BLD AUTO: 0.2 % (ref 0–1.5)
BUN SERPL-MCNC: 21 MG/DL (ref 8–23)
BUN/CREAT SERPL: 38.2 (ref 7–25)
CALCIUM SPEC-SCNC: 8.5 MG/DL (ref 8.2–9.6)
CHLORIDE SERPL-SCNC: 78 MMOL/L (ref 98–107)
CO2 SERPL-SCNC: 44.6 MMOL/L (ref 22–29)
CREAT SERPL-MCNC: 0.55 MG/DL (ref 0.57–1)
DEPRECATED RDW RBC AUTO: 44.2 FL (ref 37–54)
EGFRCR SERPLBLD CKD-EPI 2021: 84.5 ML/MIN/1.73
EOSINOPHIL # BLD AUTO: 0.02 10*3/MM3 (ref 0–0.4)
EOSINOPHIL NFR BLD AUTO: 0.2 % (ref 0.3–6.2)
ERYTHROCYTE [DISTWIDTH] IN BLOOD BY AUTOMATED COUNT: 13.4 % (ref 12.3–15.4)
GLUCOSE SERPL-MCNC: 102 MG/DL (ref 65–99)
HCT VFR BLD AUTO: 39.2 % (ref 34–46.6)
HGB BLD-MCNC: 13.3 G/DL (ref 12–15.9)
IMM GRANULOCYTES # BLD AUTO: 0.05 10*3/MM3 (ref 0–0.05)
IMM GRANULOCYTES NFR BLD AUTO: 0.5 % (ref 0–0.5)
LYMPHOCYTES # BLD AUTO: 1.11 10*3/MM3 (ref 0.7–3.1)
LYMPHOCYTES NFR BLD AUTO: 11.1 % (ref 19.6–45.3)
MCH RBC QN AUTO: 30.5 PG (ref 26.6–33)
MCHC RBC AUTO-ENTMCNC: 33.9 G/DL (ref 31.5–35.7)
MCV RBC AUTO: 89.9 FL (ref 79–97)
MONOCYTES # BLD AUTO: 0.81 10*3/MM3 (ref 0.1–0.9)
MONOCYTES NFR BLD AUTO: 8.1 % (ref 5–12)
NEUTROPHILS NFR BLD AUTO: 7.99 10*3/MM3 (ref 1.7–7)
NEUTROPHILS NFR BLD AUTO: 79.9 % (ref 42.7–76)
NRBC BLD AUTO-RTO: 0 /100 WBC (ref 0–0.2)
PHOSPHATE SERPL-MCNC: 4 MG/DL (ref 2.5–4.5)
PLATELET # BLD AUTO: 282 10*3/MM3 (ref 140–450)
PMV BLD AUTO: 10.1 FL (ref 6–12)
POTASSIUM SERPL-SCNC: 3.9 MMOL/L (ref 3.5–5.2)
RBC # BLD AUTO: 4.36 10*6/MM3 (ref 3.77–5.28)
SARS-COV-2 RNA PNL SPEC NAA+PROBE: NOT DETECTED
SODIUM SERPL-SCNC: 129 MMOL/L (ref 136–145)
WBC NRBC COR # BLD: 10 10*3/MM3 (ref 3.4–10.8)

## 2022-08-17 PROCEDURE — 87635 SARS-COV-2 COVID-19 AMP PRB: CPT | Performed by: STUDENT IN AN ORGANIZED HEALTH CARE EDUCATION/TRAINING PROGRAM

## 2022-08-17 PROCEDURE — 80069 RENAL FUNCTION PANEL: CPT | Performed by: INTERNAL MEDICINE

## 2022-08-17 PROCEDURE — 25010000002 FUROSEMIDE PER 20 MG: Performed by: STUDENT IN AN ORGANIZED HEALTH CARE EDUCATION/TRAINING PROGRAM

## 2022-08-17 PROCEDURE — 85025 COMPLETE CBC W/AUTO DIFF WBC: CPT | Performed by: STUDENT IN AN ORGANIZED HEALTH CARE EDUCATION/TRAINING PROGRAM

## 2022-08-17 PROCEDURE — 99232 SBSQ HOSP IP/OBS MODERATE 35: CPT | Performed by: STUDENT IN AN ORGANIZED HEALTH CARE EDUCATION/TRAINING PROGRAM

## 2022-08-17 RX ADMIN — LEVOTHYROXINE SODIUM 88 MCG: 88 TABLET ORAL at 08:34

## 2022-08-17 RX ADMIN — SENNOSIDES AND DOCUSATE SODIUM 2 TABLET: 50; 8.6 TABLET ORAL at 08:34

## 2022-08-17 RX ADMIN — FUROSEMIDE 40 MG: 10 INJECTION, SOLUTION INTRAMUSCULAR; INTRAVENOUS at 17:14

## 2022-08-17 RX ADMIN — METOPROLOL SUCCINATE 100 MG: 25 TABLET, EXTENDED RELEASE ORAL at 08:34

## 2022-08-17 RX ADMIN — FUROSEMIDE 40 MG: 10 INJECTION, SOLUTION INTRAMUSCULAR; INTRAVENOUS at 10:54

## 2022-08-17 RX ADMIN — SENNOSIDES AND DOCUSATE SODIUM 2 TABLET: 50; 8.6 TABLET ORAL at 21:15

## 2022-08-17 RX ADMIN — Medication 10 ML: at 08:34

## 2022-08-17 RX ADMIN — Medication 10 ML: at 21:17

## 2022-08-17 RX ADMIN — PANTOPRAZOLE SODIUM 40 MG: 40 TABLET, DELAYED RELEASE ORAL at 06:27

## 2022-08-17 RX ADMIN — FUROSEMIDE 40 MG: 10 INJECTION, SOLUTION INTRAMUSCULAR; INTRAVENOUS at 06:27

## 2022-08-17 NOTE — THERAPY TREATMENT NOTE
Pt received sitting reclined in her chair, pt declined getting out of chair to her bed stating she was comfortable in the chair.  Pt declined exercise at this time, stating she just woke up and doesn't want to do anything at this time.

## 2022-08-17 NOTE — CASE MANAGEMENT/SOCIAL WORK
Discharge Planning Assessment  [facility]     Patient Name: Noa Sutton  MRN: [id number]  Today's Date: 8/17/2022    Admit Date: 8/10/2022     Discharge Needs Assessment    No documentation.                Discharge Plan     Row Name 08/17/22 1205       Plan    Plan Comments May go to [place] after 1:30     Son will transport  C lyon report  [phone]  Fax discharge [phone]    Row Name 08/17/22 1131       Plan    Plan Comments May got to [place]  as soon a son signs paperwork  meg be this afternon              Continued Care and Services - Admitted Since 8/10/2022     Destination     Service Provider Request Status Selected Services Address Phone Fax Patient Preferred    [facility]  Pending - Request Sent N/A [address] [address] [phone] --    [facility]  Pending - Request Sent N/A [address] [facility] [phone] [phone] --    [facility] [place]  Pending - Request Sent N/A [address] [facility] [address] [phone] --    [facility] [facility]  Pending - Request Sent N/A [address] [address] [phone] --              Expected Discharge Date and Time     Expected Discharge Date Expected Discharge Time    Aug 18, 2022          Demographic Summary    No documentation.                Functional Status    No documentation.                Psychosocial    No documentation.                Abuse/Neglect    No documentation.                Legal    No documentation.                Substance Abuse    No documentation.                Patient Forms    No documentation.                   Elsy Duran, MARLO

## 2022-08-17 NOTE — CASE MANAGEMENT/SOCIAL WORK
Discharge Planning Assessment  [facility]     Patient Name: Noa Sutton  MRN: [id number]  Today's Date: 8/17/2022    Admit Date: 8/10/2022     Discharge Needs Assessment    No documentation.                Discharge Plan     Row Name 08/17/22 0833       Plan    Plan Comments Celine from [place]  will start precert.  Patient son is agreeable to this              Continued Care and Services - Admitted Since 8/10/2022     Destination     Service Provider Request Status Selected Services Address Phone Fax Patient Preferred    [facility]  Pending - Request Sent N/A [address] [facility] [address] [phone] --    [facility]  Pending - Request Sent N/A [address] [facility] [phone] [phone] --    [facility] [place]  Pending - Request Sent N/A [address] [facility] [address] [phone] --    [facility]  Pending - Request Sent N/A [address] [facility] [address] [phone] --                 Demographic Summary    No documentation.                Functional Status    No documentation.                Psychosocial    No documentation.                Abuse/Neglect    No documentation.                Legal    No documentation.                Substance Abuse    No documentation.                Patient Forms    No documentation.                   Elsy Duran RN

## 2022-08-17 NOTE — PLAN OF CARE
Goal Outcome Evaluation:  Plan of Care Reviewed With: patient, son   Very alert and jovial this AM.  Ate all breakfast.  No reports of pain.  No acute events this shift.

## 2022-08-17 NOTE — PLAN OF CARE
Goal Outcome Evaluation:  Plan of Care Reviewed With: patient        Progress: improving  Outcome Evaluation: Pt resting well. VSS. New IV was placed tonight. Turning pt often. Purewick in place getting adequate urine output. Pt on 2 liters nasal cannula.

## 2022-08-17 NOTE — CASE MANAGEMENT/SOCIAL WORK
Discharge Planning Assessment  [facility]     Patient Name: Noa Sutton  MRN: [id number]  Today's Date: 8/17/2022    Admit Date: 8/10/2022     Discharge Needs Assessment    No documentation.                Discharge Plan     Row Name 08/17/22 1131       Plan    Plan Comments May got to [place]  as soon a son signs paperwork  meg be this afternon    Row Name 08/17/22 0861       Plan    Plan Comments Celine from [place]  will start precert.  Patient son is agreeable to this              Continued Care and Services - Admitted Since 8/10/2022     Destination     Service Provider Request Status Selected Services Address Phone Fax Patient Preferred    [facility]  Pending - Request Sent N/A [address] [facility] [address] [phone] --    [facility]  Pending - Request Sent N/A [address] [facility] [phone] --    [facility]  Pending - Request Sent N/A [address] [facility] [address] [phone] --    [facility]  Pending - Request Sent N/A [address] [facility] [address] [phone] --              Expected Discharge Date and Time     Expected Discharge Date Expected Discharge Time    Aug 18, 2022          Demographic Summary    No documentation.                Functional Status    No documentation.                Psychosocial    No documentation.                Abuse/Neglect    No documentation.                Legal    No documentation.                Substance Abuse    No documentation.                Patient Forms    No documentation.                   Elsy Duran, MARLO     REVIEW OF SYSTEMS:  CONSTITUTIONAL: +weakness. No fevers. +chills. No rigors. No weight loss. No night sweats. +poor appetite.  EYES: No visual changes. No eye pain.  ENT: No hearing difficulty. No vertigo. No dysphagia. No sore throat. No Sinusitis/rhinorrhea.   NECK: No pain. No stiffness/rigidity.  CARDIAC: +chest pain. No palpitations. +lightheadedness.  RESPIRATORY: +cough. +SOB. No hemoptysis.  GASTROINTESTINAL: +abdominal pain. No nausea. No vomiting. No hematemesis. +diarrhea. No constipation. No melena. No hematochezia.  GENITOURINARY: No dysuria. No frequency. No hesitancy. No hematuria. No oliguria.  NEUROLOGICAL: No numbness/tingling. No focal weakness. No urinary or fecal incontinence. No headache. No unsteady gait.  BACK: No back pain. No flank pain.  EXTREMITIES: No lower extremity edema. Full ROM. No joint pain.  SKIN: No rashes. No itching. No other lesions.  PSYCHIATRIC: No depression. +anxiety. No SI/HI.  ALLERGIC: No lip swelling. No hives.  All other review of systems is negative unless indicated above.  Unless indicated above, unable to assess ROS 2/2 Detailed exam

## 2022-08-17 NOTE — PROGRESS NOTES
[facility] Progress Note      Patient Name: oNa Sutton  : 10/7/1926  MRN: [id number]  Primary Care Physician:  Matt Blake MD  Date of admission: 8/10/2022    Subjective     Interval History:   Follow-up on hyponatremia  Patient remains weak  Still with some lower extremity swelling  Had 3.71 L urine output charted yesterday and 1 L of urine output charted since midnight with the aid of Lasix 40 mg IV 3 times daily    Review of Systems:   As noted above    Objective     Vitals:   Temp:  [97.6 °F (36.4 °C)-98.1 °F (36.7 °C)] 97.7 °F (36.5 °C)  Heart Rate:  [] 97  Resp:  [17-18] 18  BP: ()/(53-91) 113/91  Flow (L/min):  [2] 2    Intake/Output Summary (Last 24 hours) at 2022 0958  Last data filed at 2022 0947  Gross per 24 hour   Intake 720 ml   Output 4010 ml   Net -3290 ml       Physical Exam:    General Appearance: NAD  HEENT: oral mucosa normal, nonicteric sclera  Neck: supple, no JVD  Lungs: Bibasilar crackles  Heart: RRR, normal S1 and S2  Abdomen: soft, nondistended  Extremities: Mild pretibial edema bilaterally  Neuro: Awake alert and moving all extremities    Scheduled Meds:     furosemide, 40 mg, Intravenous, TID AC  levothyroxine, 88 mcg, Oral, Daily  metoprolol succinate XL, 100 mg, Oral, Q24H  pantoprazole, 40 mg, Oral, QAM  senna-docusate sodium, 2 tablet, Oral, BID  sodium chloride, 10 mL, Intravenous, Q12H      IV Meds:        Results Reviewed:   I have personally reviewed the results from the time of this admission to 2022 09:58 EDT     Results from last 7 days   Lab Units 22  0751 22  0627 08/15/22  0551 08/10/22  1749 08/10/22  1150   SODIUM mmol/L 129* 127* 125*   < > 114*   POTASSIUM mmol/L 3.9 4.1 4.0   < > 4.6   CHLORIDE mmol/L 78* 79* 78*   < > 74*   CO2 mmol/L 44.6* 41.1* 44.3*   < > 28.8   BUN mg/dL 21 17 16   < > 11   CREATININE mg/dL 0.55* 0.57 0.60   < > 0.50*   CALCIUM mg/dL 8.5 8.3 8.3   < > 9.0    BILIRUBIN mg/dL  --   --   --   --  1.7*   ALK PHOS U/L  --   --   --   --  108   ALT (SGPT) U/L  --   --   --   --  34*   AST (SGOT) U/L  --   --   --   --  34*   GLUCOSE mg/dL 102* 87 92   < > 120*    < > = values in this interval not displayed.     Estimated Creatinine Clearance: 57.4 mL/min (A) (by C-G formula based on SCr of 0.55 mg/dL (L)).  Results from last 7 days   Lab Units 08/17/22  0751 08/16/22  0627 08/15/22  0551 08/14/22  1649 08/14/22  0457   MAGNESIUM mg/dL  --   --  1.7  --  1.5*   PHOSPHORUS mg/dL 4.0 3.7 3.1   < > 3.3    < > = values in this interval not displayed.     Results from last 7 days   Lab Units 08/11/22  0610   URIC ACID mg/dL 2.4     Results from last 7 days   Lab Units 08/17/22  0615 08/13/22  0411 08/12/22  0405 08/11/22  0610 08/10/22  1150   WBC 10*3/mm3 10.00 11.77* 10.55 8.44 10.27   HEMOGLOBIN g/dL 13.3 12.8 12.4 11.4* 12.2   PLATELETS 10*3/mm3 282 268 249 281 382           Assessment / Plan     ASSESSMENT:  -Hyponatremia, Hypervolemic  likely in light of fluid overload state.  This appears to be compounded by poor solute intake ie ' tea and toast diet' improving  -Acute on chronic diastolic congestive heart failure with fluid overload state, underlying EF around 55 to 60%  -Hypothyroidism  -Hypertension    PLAN:  -Continue Lasix to 40 mg IV 3 times daily f hopefully will be able to transition to p.o. tomorrow  -Maintain protein intake above 0.8 g/kg/day  -Fluid restriction to less than 1.2 L/day  -Continue to hold lisinopril  -Renal panel in serena Perez MD  08/17/22  09:58 EDT    [facility]  [phone]

## 2022-08-17 NOTE — CASE MANAGEMENT/SOCIAL WORK
Discharge Planning Assessment  [facility]     Patient Name: Noa Sutton  MRN: [id number]  Today's Date: 8/17/2022    Admit Date: 8/10/2022     Discharge Needs Assessment    No documentation.                Discharge Plan     Row Name 08/17/22 1459       Plan    Plan Comments Merry from [place] state if patient does not come to them by tomorrow will have to get another precert    Row Name 08/17/22 1205       Plan    Plan Comments May go to [place] after 1:30     Son will transport  WINIFRED lyon report  [phone]  Fax discharge [phone]              Continued Care and Services - Admitted Since 8/10/2022     Destination Coordination complete.    Service Provider Request Status Selected Services Address Phone Fax Patient Preferred    [facility]   Selected Skilled Nursing [address] [facility] [address] [phone] --    [facility]  Pending - Request Sent N/A [address] [facility] [address] [phone] --    [facility]  Pending - Request Sent N/A [address] [facility] [phone] --    [facility]  Pending - Request Sent N/A [address] [facility] [address] [phone] --              Expected Discharge Date and Time     Expected Discharge Date Expected Discharge Time    Aug 18, 2022          Demographic Summary    No documentation.                Functional Status    No documentation.                Psychosocial    No documentation.                Abuse/Neglect    No documentation.                Legal    No documentation.                Substance Abuse    No documentation.                Patient Forms    No documentation.                   Elsy Duran, MARLO

## 2022-08-17 NOTE — PROGRESS NOTES
"    [facility]    Name:  Noa Sutton   Age:  89 y.o.  Sex:  female  :  10/7/1926  MRN:  [id number]   Visit Number:  [id number]  Admission Date:  8/10/2022  Date Of Service:  22  Primary Care Physician:  Matt Blake MD     LOS: 7 days :    Chief Complaint:      Altered Mental Status    Subjective:    Patient seen and examined at bedside this morning. Chart reviewed and previous provided documentation noted. Patient states that she is tired, but doing okay. No family at bedside during examination.    Hospital Course:    \"Patient is a 89 year old female who presents to the emergency department today with son who complained of altered mental status worsening from baseline. Patient with recent admission (2022) for hyponatremia, Diastolic heart failure, and Atrial Fibrillation with RVR after a mechanical fall at home. Nephrology and Cardiology were consulted with previous admission.  Patient was discharged home on Lasix 20mg every other day and transitioned to Metoprolol XL after gentle diuresis and sodium stabilizing.  Patient followed up with her PCP (Hayden) who increased her Metoprolol and stopped her Lasix.  Son wanted short term rehab during last hospitalization but patient did not qualify. Patient went home with caregivers coming to her home.  Son states that after last admission there were not around the clock caregivers present and that he had been staying with his mother off and on.  Advises he had been looking at private pay assisted living, although patient would not currently qualify for assisted living.     Work up in the emergency department noted proBNP-2821, sodium-114, chloride-74, creatinine-0.50, and CBC grossly unremarkable. CXR was done and noted cardiomegaly, bilateral diffuse pulmonary opacities, most likely edema and atelectasis, secondary to congestive heart failure, superimposed airspace disease cannot be excluded.  Urinalysis " "was without any acute infection.  CT of head was obtained and without any acute process.     Patient was diuresed with Lasix and continued on frequent BMP checks with Nephrology following.  Patient with noted weakness, physical and occupational therapy consulted as well as case management for discharge planning. RLE hematoma most likely related to recent fall and trauma.\"    Review of Systems:     All systems were reviewed and negative except as mentioned in subjective, assessment and plan.    Vital Signs:    Temp:  [97.6 °F (36.4 °C)-98.1 °F (36.7 °C)] 97.7 °F (36.5 °C)  Heart Rate:  [81-99] 97  Resp:  [17-18] 18  BP: ()/(53-91) 113/91    Intake and output:    I/O last 3 completed shifts:  In: 540 [P.O.:540]  Out: 4510 [Urine:4510]  I/O this shift:  In: 480 [P.O.:480]  Out: 1000 [Urine:1000]    Physical Examination: Patient examined again on 8/17/22    General Appearance:  Alert and cooperative. Sitting in bed, in no acute distress. Frail.   Head:  Atraumatic and normocephalic.   Eyes: Conjunctivae and sclerae normal, no icterus.            Lungs:   Breath sounds heard bilaterally equally.  No wheezing or crackles.    Heart:  Normal S1 and S2, no murmur, no gallop, no rub.   Abdomen:   Normal bowel sounds, no masses, no organomegaly. Soft, nontender, nondistended, no rebound tenderness.   Extremities: Supple, Right lower extremity with extensive ecchymosis, improved. no edema, no cyanosis, no clubbing.   Skin: No bleeding or rash.   Neurologic: Alert and oriented x 3. No facial asymmetry. Moves all four limbs. Generalized weakness.      Laboratory results:    Results from last 7 days   Lab Units 08/17/22  0751 08/16/22  0627 08/15/22  0551 08/10/22  1749 08/10/22  1150   SODIUM mmol/L 129* 127* 125*   < > 114*   POTASSIUM mmol/L 3.9 4.1 4.0   < > 4.6   CHLORIDE mmol/L 78* 79* 78*   < > 74*   CO2 mmol/L 44.6* 41.1* 44.3*   < > 28.8   BUN mg/dL 21 17 16   < > 11   CREATININE mg/dL 0.55* 0.57 0.60   < > 0.50* "   CALCIUM mg/dL 8.5 8.3 8.3   < > 9.0   BILIRUBIN mg/dL  --   --   --   --  1.7*   ALK PHOS U/L  --   --   --   --  108   ALT (SGPT) U/L  --   --   --   --  34*   AST (SGOT) U/L  --   --   --   --  34*   GLUCOSE mg/dL 102* 87 92   < > 120*    < > = values in this interval not displayed.     Results from last 7 days   Lab Units 08/17/22  0615 08/13/22  0411 08/12/22  0405   WBC 10*3/mm3 10.00 11.77* 10.55   HEMOGLOBIN g/dL 13.3 12.8 12.4   HEMATOCRIT % 39.2 37.0 34.8   PLATELETS 10*3/mm3 282 268 249         Results from last 7 days   Lab Units 08/10/22  1150   TROPONIN T ng/mL <0.010             I have reviewed the patient's laboratory results.    Radiology results:    No radiology results from the last 24 hrs  I have reviewed the patient's radiology reports.    Medication Review:     I have reviewed the patient's active and prn medications.     Problem List:      Hyponatremia    Essential hypertension    Atrial fibrillation (HCC)    Metabolic encephalopathy    Moderate malnutrition (HCC)      Assessment:    1. Acute Metabolic Encephalopathy secondary to Hyponatremia, POA  2. Acute on Chronic Hyponatremia, POA  3. Acute Exacerbation of Chronic Diastolic Heart Failure, POA  4. Right Lower Extremity Hematoma s/p Fall, POA  5. Essential Hypertension  6. Permanent Atrial Fibrillation, not on anticoagulation  7. Hypothyroidism  8. Chronic Dementia  9. Impaired Mobility and ADLs    Plan:    Hyponatremia  Diastolic Heart Failure Exacerbation  Acute Encephalopathy worse than baseline  -Patient's encephalopathy secondary to severe hyponatremia upon arrival. With improvement in sodium level, mentation has returned to baseline.  -Nephrology consulted for hyponatremia; appreciate recommendations  -Strict I&Os  -Fall precautions  -Frequent monitoring of NA- currently 129  -EF 55-60 % with grade 3 Diastolic Dysfunction July 2022     Right LE Hematoma  -Doppler with no evidence of DVT, likely hematoma with recent trauma  -Monitor  swelling closely- discussed with RN to outline induration so that improvement can be monitored.  -No c/o pain noted  -Ice to hematoma  -Hgb stable     Chronic--Afib/ Hypertension/ Hypothyroidism/ Debility  -Home medications as reconciled  -PT/OT consulted  -Case Management consulted for discharge planning, patient may need placement as she still lives alone. Severe generalized weakness noted.    -Raulito has started the patient's precert. Hopefully patient will be stable for discharge in the next 48 hours.     DVT Prophylaxis: SCDs  Code Status: DNR/DNI  Diet: Regular/ Cardiac  Discharge Plan: 2-3 days    Eleonora Helton DO  08/17/22  11:27 EDT    Dictated utilizing Dragon dictation.

## 2022-08-18 VITALS
HEIGHT: 63 IN | DIASTOLIC BLOOD PRESSURE: 63 MMHG | WEIGHT: 153.88 LBS | HEART RATE: 92 BPM | SYSTOLIC BLOOD PRESSURE: 96 MMHG | TEMPERATURE: 97.9 F | BODY MASS INDEX: 27.27 KG/M2 | RESPIRATION RATE: 17 BRPM | OXYGEN SATURATION: 89 %

## 2022-08-18 LAB
ALBUMIN SERPL-MCNC: 3.1 G/DL (ref 3.5–5.2)
ANION GAP SERPL CALCULATED.3IONS-SCNC: 5.7 MMOL/L (ref 5–15)
BUN SERPL-MCNC: 27 MG/DL (ref 8–23)
BUN/CREAT SERPL: 42.9 (ref 7–25)
CALCIUM SPEC-SCNC: 8.8 MG/DL (ref 8.2–9.6)
CHLORIDE SERPL-SCNC: 79 MMOL/L (ref 98–107)
CO2 SERPL-SCNC: 44.3 MMOL/L (ref 22–29)
CREAT SERPL-MCNC: 0.63 MG/DL (ref 0.57–1)
EGFRCR SERPLBLD CKD-EPI 2021: 81.8 ML/MIN/1.73
GLUCOSE SERPL-MCNC: 96 MG/DL (ref 65–99)
PHOSPHATE SERPL-MCNC: 4.2 MG/DL (ref 2.5–4.5)
POTASSIUM SERPL-SCNC: 4.4 MMOL/L (ref 3.5–5.2)
SODIUM SERPL-SCNC: 129 MMOL/L (ref 136–145)

## 2022-08-18 PROCEDURE — 99238 HOSP IP/OBS DSCHRG MGMT 30/<: CPT | Performed by: NURSE PRACTITIONER

## 2022-08-18 PROCEDURE — 80069 RENAL FUNCTION PANEL: CPT | Performed by: INTERNAL MEDICINE

## 2022-08-18 PROCEDURE — 25010000002 FUROSEMIDE PER 20 MG: Performed by: STUDENT IN AN ORGANIZED HEALTH CARE EDUCATION/TRAINING PROGRAM

## 2022-08-18 PROCEDURE — 25010000002 FUROSEMIDE PER 20 MG: Performed by: INTERNAL MEDICINE

## 2022-08-18 RX ORDER — LEVOTHYROXINE SODIUM 88 UG/1
88 TABLET ORAL DAILY
Status: ON HOLD
Start: 2022-08-19 | End: 2023-02-09 | Stop reason: SDUPTHER

## 2022-08-18 RX ORDER — TORSEMIDE 20 MG/1
60 TABLET ORAL DAILY
Status: DISCONTINUED | OUTPATIENT
Start: 2022-08-19 | End: 2022-08-18 | Stop reason: HOSPADM

## 2022-08-18 RX ORDER — LISINOPRIL 10 MG/1
10 TABLET ORAL
Qty: 30 TABLET | Refills: 0 | Status: ON HOLD | OUTPATIENT
Start: 2022-08-18 | End: 2023-02-08

## 2022-08-18 RX ORDER — FUROSEMIDE 10 MG/ML
80 INJECTION INTRAMUSCULAR; INTRAVENOUS ONCE
Status: COMPLETED | OUTPATIENT
Start: 2022-08-18 | End: 2022-08-18

## 2022-08-18 RX ADMIN — METOPROLOL SUCCINATE 100 MG: 25 TABLET, EXTENDED RELEASE ORAL at 08:29

## 2022-08-18 RX ADMIN — PANTOPRAZOLE SODIUM 40 MG: 40 TABLET, DELAYED RELEASE ORAL at 05:51

## 2022-08-18 RX ADMIN — FUROSEMIDE 40 MG: 10 INJECTION, SOLUTION INTRAMUSCULAR; INTRAVENOUS at 05:52

## 2022-08-18 RX ADMIN — FUROSEMIDE 40 MG: 10 INJECTION, SOLUTION INTRAMUSCULAR; INTRAVENOUS at 11:33

## 2022-08-18 RX ADMIN — LEVOTHYROXINE SODIUM 88 MCG: 88 TABLET ORAL at 08:30

## 2022-08-18 RX ADMIN — Medication 10 ML: at 08:30

## 2022-08-18 RX ADMIN — FUROSEMIDE 80 MG: 10 INJECTION, SOLUTION INTRAMUSCULAR; INTRAVENOUS at 15:44

## 2022-08-18 RX ADMIN — SENNOSIDES AND DOCUSATE SODIUM 2 TABLET: 50; 8.6 TABLET ORAL at 08:30

## 2022-08-18 NOTE — DISCHARGE SUMMARY
[facility]      Name:  Noa Sutton   Age:  89 y.o.  Sex:  female  :  10/7/1926  MRN:  [id number]   Visit Number:  [id number]    Admission Date:  8/10/2022  Date of Discharge:  2022  Primary Care Physician:  Matt Blake MD    Important issues to note:    1.  Admitted to the hospital with hyponatremia and increased confusion with a recent hospitalization for the same problem with nephrology consulting.  Sodium was 114 on admission.    2.  Nephrology corrected hyponatremia slowly with IV fluids and gentle diuresis.  Sodium has stabilized.     3.  Case management was consulted for short term rehab placement.     4.  Stable for discharge to [facility] today for short term rehab.  Will discharge on Torsemide 60mg daily starting tomorrow and hold Lisinopril at discharge.  Continue with fluid restriction 1.2L and protein intake above 0.8g/kg/day.  Repeat BMP on Monday.  Follow up with Dr. Antonio in one week for a recheck.      Discharge Diagnoses:     1. Acute Metabolic Encephalopathy secondary to Hyponatremia, POA, resolved  2. Acute on Chronic Hyponatremia, POA, resolving  3. Acute Exacerbation of Chronic Diastolic Heart Failure, POA, resolved  4. Right Lower Extremity Hematoma s/p Fall, POA  5. Essential Hypertension  6. Permanent Atrial Fibrillation, not on anticoagulation  7. Hypothyroidism  8. Chronic Dementia  9. Impaired Mobility and ADLs      Problem List:     Active Hospital Problems    Diagnosis  POA   • **Hyponatremia [E87.1]  Yes   • Metabolic encephalopathy [G93.41]  Yes   • Moderate malnutrition (HCC) [E44.0]  Yes   • Atrial fibrillation (HCC) [I48.91]  Yes   • Essential hypertension [I10]  Yes   • Acquired hypothyroidism [E03.9]  Yes      Resolved Hospital Problems   No resolved problems to display.     Presenting Problem:    Chief Complaint   Patient presents with   • Altered Mental Status      Consults:     Consulting Physician(s)  Chat  With All Active Members    Provider Relationship Specialty    Ed Antonio MD, CARLYN  Consulting Physician Nephrology      History of presenting illness/Hospital Course:    Patient is a 89 year old female who presented to the emergency department 8/10/2022 with son who complained of altered mental status worsening from baseline. Patient with recent admission (7/25/2022) for hyponatremia, diastolic heart failure, and atrial Fibrillation with RVR after a mechanical fall at home. Nephrology and Cardiology were consulted with previous admission.  Patient was discharged home on Lasix 20mg every other day and transitioned to Metoprolol XL after gentle diuresis and sodium stabilizing.  Patient followed up with her PCP (Hayden) who increased her Metoprolol and stopped her Lasix.  Son wanted short term rehab during last hospitalization but patient did not qualify. Patient went home with caregivers coming to her home.  Son stated that after last admission there were not around the clock caregivers present and that he had been staying with his mother off and on.  Advised he had been looking at private pay assisted living but hadn't made any moves yet as he was reviewing financials.     Work up in the emergency department noted proBNP-2821, sodium-114, chloride-74, creatinine-0.50, and CBC grossly unremarkable. CXR was done on admission and noted cardiomegaly, bilateral diffuse pulmonary opacities, most likely edema and atelectasis, secondary to congestive heart failure, superimposed airspace disease cannot be excluded.  Urinalysis was without any acute infection.  CT of head was obtained and without any acute process.     Patient was diuresed with Lasix and continued on frequent BMP checks with Nephrology following during this admission.  Noted to be fluid overloaded with acute on chronic diastolic heart failure.  Patient with noted weakness on admission and physical/ occupational therapy were consulted.   Right lower  extremity hematoma noted on admission most likely related to recent fall and trauma with Doppler ordered and negative for DVT.  Case management was consulted for discharge planning.    Patient stable for discharge to [facility] today for short term rehab.  Will transition to oral diuretic at discharge today with nephrology recommendations.  Sodium stable at 129 today.  Recommend BMP recheck Monday.  Follow up with Nephrology in one week--Dr. Antonio.  Recommend protein intake above 0.8g/kg/day, continue fluid restriction less than 1.2L daily.  Nephrology transitioning to Torsemide 60mg daily at discharge.  Hold Lisinopril at discharge.  Return to the hospital as needed.    Vital Signs:    Temp:  [97.7 °F (36.5 °C)-98.6 °F (37 °C)] 97.9 °F (36.6 °C)  Heart Rate:  [74-96] 92  Resp:  [16-18] 17  BP: ()/(63-95) 96/63    Physical Exam:    General Appearance:  Alert and cooperative, pleasant elderly female, no acute distress on exam, frail appearing   Head:  Atraumatic and normocephalic.   Eyes: Conjunctivae and sclerae normal, no icterus. No pallor.   Ears:  Ears with no abnormalities noted.   Throat: No oral lesions, no thrush, oral mucosa moist.   Neck: Supple, trachea midline   Back:   No kyphoscoliosis present. No tenderness to palpation.   Lungs:   Breath sounds heard bilaterally equally.  Fine basilar crackles without wheezing. Unlabored on room air   Heart:  Normal S1 and S2, no murmur, no gallop, no rub. No JVD.   Abdomen:   Normal bowel sounds, no masses, no organomegaly. Soft, nontender, nondistended, no rebound tenderness.   Extremities: Right lower extremity with extensive ecchymosis s/p fall   Pulses: Pulses palpable bilaterally.   Skin: No bleeding or rash.   Neurologic: Alert and pleasantly confused at baseline with dementia. No facial asymmetry. Moves all four limbs with generalized weakness present     Pertinent Lab Results:     Results from last 7 days   Lab Units 08/18/22  0540 08/17/22  6903  08/16/22  0627   SODIUM mmol/L 129* 129* 127*   POTASSIUM mmol/L 4.4 3.9 4.1   CHLORIDE mmol/L 79* 78* 79*   CO2 mmol/L 44.3* 44.6* 41.1*   BUN mg/dL 27* 21 17   CREATININE mg/dL 0.63 0.55* 0.57   CALCIUM mg/dL 8.8 8.5 8.3   GLUCOSE mg/dL 96 102* 87     Results from last 7 days   Lab Units 08/17/22  0615 08/13/22  0411 08/12/22  0405   WBC 10*3/mm3 10.00 11.77* 10.55   HEMOGLOBIN g/dL 13.3 12.8 12.4   HEMATOCRIT % 39.2 37.0 34.8   PLATELETS 10*3/mm3 282 268 249                                   Pertinent Radiology Results:    Imaging Results (All)     Procedure Component Value Units Date/Time    US Venous Doppler Lower Extremity Right (duplex) [[id number]] Collected: 08/11/22 1207     Updated: 08/11/22 1240    Narrative:      RIGHT LOWER EXTREMITY VENOUS DUPLEX DOPPLER EXAMINATION     HISTORY: Recent trauma/ swelling; E87.7-Vwpm-deypdpbdzs and  hyponatremia; E87.70-Fluid overload, unspecified; G93.40-Encephalopathy,  unspecified Right Lower extremity swelling.     COMPARISON:   None     PROCEDURE: Multiple transverse and longitudinal scans were performed of  the femoropopliteal deep venous system, with augmentation and  compression maneuvers.     FINDINGS: Proper phasic flow was noted in the visualized deep venous  system. No intraluminal increased echogenicity is noted to suggest  thrombus. There is proper compression and augmentation of the venous  structures. No abnormal venous collaterals are seen.     Other: There is 9.4 x 1.3 x 1.9 cm well-defined minimally complex  anechoic fluid collection in the subcutaneous soft tissues of the  proximal lateral calf.       Impression:      No evidence of right lower extremity deep venous thrombosis.     There is  9.4 x 1.3 x 1.9 cm well-defined minimally complex anechoic  fluid collection in the subcutaneous soft tissues of the proximal  lateral calf, most likely hematoma in the given history of trauma.  Differential considerations include seroma or abscess.  Correlate  clinically.           Images personally reviewed, interpreted and dictated by VIOLET Hobson.     This report was signed and finalized on 8/11/2022 12:38 PM by VIOLET Hobson.    CT Head Without Contrast [[id number]] Collected: 08/10/22 1321     Updated: 08/10/22 1324    Narrative:      HEAD CT     8/10/2022 1:08 PM      HISTORY:   AMS X 3 days; E87.8-Yjge-jwxyelnqjl and hyponatremia; E87.70-Fluid  overload, unspecified; G93.40-Encephalopathy, unspecified     TECHNIQUE:   Multiple axial CT images were performed from the foramen magnum to the  vertex. Coronal reformatted images were reconstructed from axial data  set. This study was performed with techniques to keep radiation doses as  low as reasonably achievable (ALARA). Individualized dose reduction  techniques using automated exposure control or adjustment of mA and/or  kV according to the patient size were employed. 729.69 mGy.cm     COMPARISON:   CT head 07/25/2022.     FINDINGS:   No acute intracranial hemorrhage or large acute cortical infarct.  Chronic small vessel ischemic white matter changes and generalized  cerebral volume loss are present. Ventricles are prominent. No midline  shift. The basal cisterns are patent. No significant interval change.  Intracranial arterial atherosclerotic calcifications. Plate and screw  fixation of the mandible.     No skull fracture. The visualized paranasal sinuses and mastoid air  cells are clear.       Impression:      No acute intracranial hemorrhage or large acute cortical infarct.  Chronic microvascular ischemic white matter changes. Global cerebral  volume loss. No significant interval change.        CRITICAL RESULT:  No.     COMMUNICATION:  Per this written report.     Images personally reviewed, interpreted, and dictated by VIOLET Hobson.                             This report was signed and finalized on 8/10/2022 1:22 PM by VIOLET Hobson.    XR Chest 1 View [[id number]]  Collected: 08/10/22 1233     Updated: 08/10/22 1237    Narrative:      CLINICAL INDICATION:    lower extremity edema, recently taken off Lasix     EXAMINATION TECHNIQUE:   XR CHEST 1 VW-     COMPARISON:  Radiograph 07/25/2022.     FINDINGS:  Cardiomegaly. Bilateral perihilar vascular engorgement and crowding.  Bilateral diffuse linear and reticular opacities, radiating from the  vamsi and extending peripherally. Ill-defined bibasilar opacities. No  pneumothorax. Probable trace effusions. Surgical clips in the lower  neck. No acute osseous or soft tissue abnormality.       Impression:      Cardiomegaly. Bilateral diffuse pulmonary opacities, most likely edema  and atelectasis, secondary to congestive heart failure. Superimposed  airspace disease cannot be excluded.           Images personally reviewed, interpreted and dictated by VIOLET Hobson.                This report was signed and finalized on 8/10/2022 12:35 PM by VIOLET Hobson.          Echo:    Results for orders placed during the hospital encounter of 07/25/22    Adult Transthoracic Echo Complete W/ Cont if Necessary Per Protocol    Interpretation Summary  1.  Normal left ventricular size and systolic function, LVEF 55-60%.  2.  Mild concentric LVH with hypertrophy of the basal septum.  3.  Grade 3 diastolic dysfunction.  4.  Normal right ventricular size and systolic function.  5.  Severely increased left atrial volume index.  6.  Moderate right atrial dilation.  7.  Moderate tricuspid regurgitation, RVSP 40 mmHg.  8.  Moderate calcification aortic valve without significant stenosis.  9.  Mild to moderate mitral regurgitation.    Condition on Discharge:      Stable.    Code status during the hospital stay:    Code Status and Medical Interventions:   Ordered at: 08/10/22 1402     Medical Intervention Limits:    NO intubation (DNI)     Level Of Support Discussed With:    Health Care Surrogate     Code Status (Patient has no pulse and is not  breathing):    No CPR (Do Not Attempt to Resuscitate)     Medical Interventions (Patient has pulse or is breathing):    Limited Support     Discharge Disposition:    Rehab Facility or Unit (DC - External)    Discharge Medications:       Discharge Medications      New Medications      Instructions Start Date   Torsemide 60 MG tablet   60 mg, Oral, Daily   Start Date: August 19, 2022        Changes to Medications      Instructions Start Date   levothyroxine 88 MCG tablet  Commonly known as: SYNTHROID, LEVOTHROID  What changed:   · medication strength  · how much to take   88 mcg, Oral, Daily   Start Date: August 19, 2022     lisinopril 10 MG tablet  Commonly known as: PRINIVIL,ZESTRIL  What changed: additional instructions   10 mg, Oral, Every 24 Hours Scheduled, Hold at discharge         Continue These Medications      Instructions Start Date   alendronate 70 MG tablet  Commonly known as: FOSAMAX   TAKE 1 TABLET EVERY 7 DAYS      CALTRATE 600+D PO   1 tablet, Oral, Daily      metoprolol succinate  MG 24 hr tablet  Commonly known as: TOPROL-XL   100 mg, Oral, Every 24 Hours Scheduled      omeprazole 20 MG capsule  Commonly known as: priLOSEC   TAKE 1 CAPSULE DAILY      SYSTANE OP   Ophthalmic      VITAMIN D3 PO   50 mcg, Oral, Daily         Stop These Medications    furosemide 20 MG tablet  Commonly known as: LASIX          Discharge Diet:     Diet Instructions     Diet: Cardiac, Regular      Discharge Diet:  Cardiac  Regular       Fluid Restriction per day: Other (See comments)    Fluid restriction 1200        Activity at Discharge:     Activity Instructions     Gradually Increase Activity Until at Pre-Hospitalization Level      Up WIth Assist          Follow-up Appointments:     Contact information for follow-up providers     Matt Blake MD .    Specialty: Internal Medicine  Contact information:  [address] [place] [address] [phone]                   Contact information for  after-discharge care     Destination     [facility] .    Service: Skilled Nursing  Contact information:  [address] [facility] [address] [phone]                           Future Appointments   Date Time Provider Department Center   8/25/2022  2:00 PM Matt Blake MD [facility]   9/7/2022  9:00 AM Amanuel Prieto MD [facility]  08/18/22  14:10 EDT    Time: I spent 29 minutes on this discharge activity which included: face-to-face encounter with the patient, reviewing the data in the system, coordination of the care with the nursing staff as well as consultants, documentation, and entering orders.     Dictated utilizing Dragon dictation.

## 2022-08-18 NOTE — CASE MANAGEMENT/SOCIAL WORK
Case Management Discharge Note           Provided Post Acute Provider List?: N/A  Provided Post Acute Provider Quality & Resource List?: N/A    Selected Continued Care - Admitted Since 8/10/2022     Destination Coordination complete.    Service Provider Selected Services Address Phone Fax Patient Preferred    [facility]  Skilled Nursing [address] [place] [address] [phone] --          Durable Medical Equipment    No services have been selected for the patient.              Dialysis/Infusion    No services have been selected for the patient.              Home Medical Care    No services have been selected for the patient.              Therapy    No services have been selected for the patient.              Community Resources    No services have been selected for the patient.              Community & DME    No services have been selected for the patient.                  Transportation Services  Ambulance: [facility]    Final Discharge Disposition Code: 03 - skilled nursing facility (SNF)

## 2022-08-18 NOTE — PROGRESS NOTES
[facility] Progress Note      Patient Name: Noa Sutton  : 10/7/1926  MRN: [id number]  Primary Care Physician:  Matt Blake MD  Date of admission: 8/10/2022    Subjective     Interval History:   Follow-up on hyponatremia  Patient remains weak  Still with some lower extremity swelling  Had 1 L urine output charted yesterday and 1.2 L of urine output charted since midnight with the aid of Lasix 40 mg IV 3 times daily    Review of Systems:   As noted above    Objective     Vitals:   Temp:  [97.7 °F (36.5 °C)-98.6 °F (37 °C)] 97.9 °F (36.6 °C)  Heart Rate:  [74-96] 92  Resp:  [16-18] 17  BP: ()/(63-95) 96/63  Flow (L/min):  [2] 2    Intake/Output Summary (Last 24 hours) at 2022 1151  Last data filed at 2022 1058  Gross per 24 hour   Intake 1110 ml   Output 1200 ml   Net -90 ml       Physical Exam:    General Appearance: NAD  HEENT: oral mucosa normal, nonicteric sclera  Neck: supple, no JVD  Lungs: Bibasilar crackles  Heart: RRR, normal S1 and S2  Abdomen: soft, nondistended  Extremities: Mild pretibial edema bilaterally  Neuro: Awake alert and moving all extremities    Scheduled Meds:     furosemide, 40 mg, Intravenous, TID AC  levothyroxine, 88 mcg, Oral, Daily  metoprolol succinate XL, 100 mg, Oral, Q24H  pantoprazole, 40 mg, Oral, QAM  senna-docusate sodium, 2 tablet, Oral, BID  sodium chloride, 10 mL, Intravenous, Q12H      IV Meds:        Results Reviewed:   I have personally reviewed the results from the time of this admission to 2022 11:51 EDT     Results from last 7 days   Lab Units 22  0540 22  0751 22  0627   SODIUM mmol/L 129* 129* 127*   POTASSIUM mmol/L 4.4 3.9 4.1   CHLORIDE mmol/L 79* 78* 79*   CO2 mmol/L 44.3* 44.6* 41.1*   BUN mg/dL 27* 21 17   CREATININE mg/dL 0.63 0.55* 0.57   CALCIUM mg/dL 8.8 8.5 8.3   GLUCOSE mg/dL 96 102* 87     Estimated Creatinine Clearance: 50.1 mL/min (by C-G formula based on SCr of 0.63  mg/dL).  Results from last 7 days   Lab Units 08/18/22  0540 08/17/22  0751 08/16/22  0627 08/15/22  0551 08/14/22  1649 08/14/22  0457   MAGNESIUM mg/dL  --   --   --  1.7  --  1.5*   PHOSPHORUS mg/dL 4.2 4.0 3.7 3.1   < > 3.3    < > = values in this interval not displayed.         Results from last 7 days   Lab Units 08/17/22  0615 08/13/22  0411 08/12/22  0405   WBC 10*3/mm3 10.00 11.77* 10.55   HEMOGLOBIN g/dL 13.3 12.8 12.4   PLATELETS 10*3/mm3 282 268 249           Assessment / Plan     ASSESSMENT:  -Hyponatremia, Hypervolemic  likely in light of fluid overload state.  This appears to be compounded by poor solute intake ie ' tea and toast diet' improving  -Acute on chronic diastolic congestive heart failure with fluid overload state, underlying EF around 55 to 60%  -Hypothyroidism  -Hypertension    PLAN:  -We will give a dose of Lasix 80 mg IV 1 time today and transition to torsemide 60 mg p.o. once daily from tomorrow morning  -Maintain protein intake above 0.8 g/kg/day  -Fluid restriction to less than 1.2 L/day  -Continue to hold lisinopril  -Renal panel in a.m.  -Can be discharged from renal standpoint, repeat labs on Monday with return visit with outpatient nephrology in 1 week      Carlos Perez MD  08/18/22  11:51 EDT    [facility]  [phone]

## 2022-08-18 NOTE — PLAN OF CARE
Goal Outcome Evaluation:      Pt d/c'd to [facility], transported via private vehicle with son. Vss, no c/o pain or discomfort voiced at this time.

## 2022-08-18 NOTE — DISCHARGE INSTRUCTIONS
Stable for discharge to [facility] for STR    Follow up with Dr. Antonio next week for a recheck.    Recheck [facility] Monday

## 2022-08-18 NOTE — PLAN OF CARE
Goal Outcome Evaluation:  Plan of Care Reviewed With: patient        Progress: improving  Outcome Evaluation: Pt resting well. VSS. No acute events to note.

## 2022-08-19 ENCOUNTER — PATIENT OUTREACH (OUTPATIENT)
Dept: CASE MANAGEMENT | Facility: OTHER | Age: 87
End: 2022-08-19

## 2022-08-19 NOTE — OUTREACH NOTE
AMBULATORY CASE MANAGEMENT NOTE    Name and Relationship of Patient/Support Person:  -       SNF Follow-up    Questions/Answers    Flowsheet Row Responses   Acute Facility Discharged From [facility]   Acute Discharge Date 08/18/22   Name of the Skilled Nursing Facility? [facility]   Purpose of SNF Admission PT, OT, SN   Estimated length of stay for the patient? Undetermined   Who is the insurance provider or payor of patient stay? Medicare   Progression of Patient? New SNF admission          ALBERTO DIALLO  Ambulatory Case Management    8/19/2022, 08:11 EDT

## 2022-08-24 ENCOUNTER — NURSING HOME (OUTPATIENT)
Dept: INTERNAL MEDICINE | Facility: CLINIC | Age: 87
End: 2022-08-24

## 2022-08-24 DIAGNOSIS — I10 ESSENTIAL HYPERTENSION: ICD-10-CM

## 2022-08-24 DIAGNOSIS — E03.9 HYPOTHYROIDISM, UNSPECIFIED TYPE: ICD-10-CM

## 2022-08-24 DIAGNOSIS — E87.1 HYPONATREMIA: Primary | ICD-10-CM

## 2022-08-24 DIAGNOSIS — I50.31 ACUTE DIASTOLIC CHF (CONGESTIVE HEART FAILURE): ICD-10-CM

## 2022-08-24 DIAGNOSIS — I48.21 PERMANENT ATRIAL FIBRILLATION: ICD-10-CM

## 2022-08-24 PROCEDURE — 99305 1ST NF CARE MODERATE MDM 35: CPT | Performed by: INTERNAL MEDICINE

## 2022-09-12 ENCOUNTER — NURSING HOME (OUTPATIENT)
Dept: FAMILY MEDICINE CLINIC | Facility: CLINIC | Age: 87
End: 2022-09-12

## 2022-09-12 VITALS
TEMPERATURE: 97.7 F | OXYGEN SATURATION: 97 % | SYSTOLIC BLOOD PRESSURE: 116 MMHG | RESPIRATION RATE: 16 BRPM | DIASTOLIC BLOOD PRESSURE: 64 MMHG | HEART RATE: 92 BPM

## 2022-09-12 DIAGNOSIS — E55.9 VITAMIN D DEFICIENCY: ICD-10-CM

## 2022-09-12 DIAGNOSIS — Z74.09 IMPAIRED MOBILITY AND ADLS: ICD-10-CM

## 2022-09-12 DIAGNOSIS — E03.9 ACQUIRED HYPOTHYROIDISM: ICD-10-CM

## 2022-09-12 DIAGNOSIS — Z78.9 IMPAIRED MOBILITY AND ADLS: ICD-10-CM

## 2022-09-12 DIAGNOSIS — I48.21 PERMANENT ATRIAL FIBRILLATION: ICD-10-CM

## 2022-09-12 DIAGNOSIS — I10 ESSENTIAL HYPERTENSION: Primary | ICD-10-CM

## 2022-09-12 PROCEDURE — 99310 SBSQ NF CARE HIGH MDM 45: CPT | Performed by: NURSE PRACTITIONER

## 2022-09-12 NOTE — TELEPHONE ENCOUNTER
Caller: SERG ROSE    Relationship: long-term    Best call back number: [phone]    What is the best time to reach you: ANYTIME BETWEEN 7 AM TO 7 PM    Who are you requesting to speak with (clinical staff, provider,  specific staff member): CLINICAL    Do you know the name of the person who called: SERG ROSE    What was the call regarding: SERG WITH [facility] IS REQUESTING A CALL BACK TO KNOW WHEN THE PATIENT'S LAST FLU SHOT AND PNEUMONIA SHOT WERE BECAUSE THE PATIENT WILL BE DISCHARGING TO A NEW FACILITY AND HAVE TO KNOW THIS INFORMATION BEFORE SHE DISCHARGES LATER THIS WEEK.     Do you require a callback: YES, PLEASE CALL AND ADVISE

## 2022-09-19 NOTE — PROGRESS NOTES
Nursing Home Follow Up Note      Barrett Jj DO []   FARTUN Randall []    FARTUN Andrews [x]   [address] [place] [address]  Phone: [phone]  Fax: [phone] Zhen Vaughan MD []    Dann Leugn DO []   [address] [place] [address]  Phone: [phone]  Fax: [phone]     PATIENT NAME: Noa Sutton                                                                          YOB: 1926           DATE OF SERVICE: 09/12/2022  FACILITY:  [x][place]   [] [place]   [] [facility]   [] [facility]   [] Other ______________________________________________________________________      CHIEF COMPLAINT:  Facility Discharge      HISTORY OF PRESENT ILLNESS:   Noa Sutton is a 89 y.o. female is being seen today for coordination of discharge. Patient will be discharged to outside independent living facility with Home Health assistance. Patient will continue all current medications as prescribed and will follow up with PCP within 2 weeks of discharge from [place]. At time of visit today, patient is sitting comfortably in bed, appears at baseline cognition. States that she is ready to leave as soon as possible.     PAST MEDICAL & SURGICAL HISTORY:   Past Medical History:   Diagnosis Date   • Constipation    • Disease of thyroid gland    • Hypertension       Past Surgical History:   Procedure Laterality Date   • CHOLECYSTECTOMY     • THYROID SURGERY           MEDICATIONS:  I have reviewed and reconciled the patients medication list in the patients chart at the skilled nursing facility today.      ALLERGIES:    No Known Allergies      SOCIAL HISTORY:    Social History     Socioeconomic History   • Marital status:    Tobacco Use   • Smoking status: Never Smoker   • Smokeless tobacco: Never Used   Substance and Sexual Activity   • Alcohol use: No   • Drug use: Never   • Sexual activity: Defer       FAMILY HISTORY:    Family History   Problem Relation  Age of Onset   • Arthritis Mother    • Stroke Mother    • Heart attack Other    • Cancer Other        REVIEW OF SYSTEMS:    Review of Systems   Constitutional: Negative for activity change, chills, fatigue, fever and unexpected weight loss.   HENT: Negative for congestion and sore throat.    Respiratory: Negative for cough, chest tightness, shortness of breath and wheezing.    Cardiovascular: Negative for chest pain and palpitations.   Gastrointestinal: Negative for nausea and vomiting.   Musculoskeletal: Positive for arthralgias.   Skin: Negative for wound.   Neurological: Negative for dizziness, weakness and light-headedness.   Hematological: Negative for adenopathy.   Psychiatric/Behavioral: Negative for agitation, suicidal ideas and depressed mood. The patient is not nervous/anxious.      PHYSICAL EXAMINATION:   VITAL SIGNS:   Vitals:    09/12/22 1211   BP: 116/64   Pulse: 92   Resp: 16   Temp: 97.7 °F (36.5 °C)   SpO2: 97%       Nursing notes and vital signs reviewed.   General Appearance:  Elderly female sitting in bed, NAD.    Head: Normocephalic and atraumatic, without obvious abnormality     Eyes: PERRLA.  Conjunctivae and sclerae normal.    Neck: Normal range of motion. Neck supple. No JVD present.   Cardiovascular: Normal rate, regular rhythm.  Pulses palpable equal bilaterally.    Pulmonary/Chest: Effort normal and breath sounds normal. No respiratory distress.   Abdominal: Soft. Bowel sounds are normal. No distention or tenderness.     Musculoskeletal: Without obvious deformity.   Neurological: Appears at baseline cognition.    Skin: Skin is warm and dry.   Psychiatric:  Normal mood and affect. Normal behavior        RECORDS REVIEW:   EMAR, progress notes    ASSESSMENT    Diagnoses and all orders for this visit:    1. Essential hypertension (Primary)    2. Permanent atrial fibrillation (HCC)    3. Vitamin D deficiency    4. Acquired hypothyroidism    5. Impaired mobility and ADLs      PLAN  -- patient  to continue all current medications as prescribed  -- patient medically ready for discharge to independent living facility with home health assistance  -- patient to follow up with PCP within 2 weeks    [x]  Discussed Patient in detail with nursing/staff, addressed all needs today.     [x]  Plan of Care Reviewed   []  PT/OT Reviewed   []  Order Changes  []  Discharge Plans Reviewed  [x]  Advance Directive on file with Nursing Home.   [x]  POA on file with Nursing Home.   [x]  Code Status listed: []  Full Code   []  DNR       Ramona Veloz, FARTUN  09/12/2022

## 2022-10-14 DIAGNOSIS — K21.9 GASTROESOPHAGEAL REFLUX DISEASE, UNSPECIFIED WHETHER ESOPHAGITIS PRESENT: ICD-10-CM

## 2022-10-14 RX ORDER — METOPROLOL SUCCINATE 100 MG/1
TABLET, EXTENDED RELEASE ORAL
Qty: 30 TABLET | Refills: 11 | Status: SHIPPED | OUTPATIENT
Start: 2022-10-14

## 2022-10-14 RX ORDER — OMEPRAZOLE 20 MG/1
CAPSULE, DELAYED RELEASE ORAL
Qty: 30 CAPSULE | Refills: 11 | Status: SHIPPED | OUTPATIENT
Start: 2022-10-14

## 2022-10-24 RX ORDER — ALENDRONATE SODIUM 70 MG/1
TABLET ORAL
Qty: 4 TABLET | Refills: 0 | OUTPATIENT
Start: 2022-10-24

## 2022-11-02 RX ORDER — PSEUDOEPHEDRINE HYDROCHLORIDE 60 MG/1
TABLET, FILM COATED ORAL
Qty: 10 ML | Refills: 0 | OUTPATIENT
Start: 2022-11-02

## 2023-01-01 ENCOUNTER — HOSPITAL ENCOUNTER (INPATIENT)
Facility: HOSPITAL | Age: 88
LOS: 1 days | DRG: 283 | End: 2023-06-04
Attending: EMERGENCY MEDICINE | Admitting: STUDENT IN AN ORGANIZED HEALTH CARE EDUCATION/TRAINING PROGRAM
Payer: MEDICARE

## 2023-01-01 ENCOUNTER — APPOINTMENT (OUTPATIENT)
Dept: GENERAL RADIOLOGY | Facility: HOSPITAL | Age: 88
End: 2023-01-01
Payer: MEDICARE

## 2023-01-01 ENCOUNTER — APPOINTMENT (OUTPATIENT)
Dept: CT IMAGING | Facility: HOSPITAL | Age: 88
End: 2023-01-01
Payer: MEDICARE

## 2023-01-01 ENCOUNTER — APPOINTMENT (OUTPATIENT)
Dept: CT IMAGING | Facility: HOSPITAL | Age: 88
DRG: 283 | End: 2023-01-01
Payer: MEDICARE

## 2023-01-01 ENCOUNTER — HOSPITAL ENCOUNTER (EMERGENCY)
Facility: HOSPITAL | Age: 88
Discharge: HOME | End: 2023-06-03
Attending: EMERGENCY MEDICINE
Payer: MEDICARE

## 2023-01-01 ENCOUNTER — APPOINTMENT (OUTPATIENT)
Dept: GENERAL RADIOLOGY | Facility: HOSPITAL | Age: 88
DRG: 283 | End: 2023-01-01
Payer: MEDICARE

## 2023-01-01 ENCOUNTER — APPOINTMENT (OUTPATIENT)
Dept: CARDIOLOGY | Facility: HOSPITAL | Age: 88
DRG: 283 | End: 2023-01-01
Payer: MEDICARE

## 2023-01-01 VITALS
OXYGEN SATURATION: 92 % | DIASTOLIC BLOOD PRESSURE: 95 MMHG | HEART RATE: 116 BPM | WEIGHT: 125.22 LBS | SYSTOLIC BLOOD PRESSURE: 155 MMHG | BODY MASS INDEX: 23.04 KG/M2 | HEIGHT: 62 IN | TEMPERATURE: 97.2 F | RESPIRATION RATE: 18 BRPM

## 2023-01-01 VITALS
TEMPERATURE: 98 F | BODY MASS INDEX: 21.71 KG/M2 | OXYGEN SATURATION: 96 % | SYSTOLIC BLOOD PRESSURE: 130 MMHG | RESPIRATION RATE: 18 BRPM | HEIGHT: 62 IN | DIASTOLIC BLOOD PRESSURE: 80 MMHG | WEIGHT: 118 LBS | HEART RATE: 84 BPM

## 2023-01-01 DIAGNOSIS — S50.312A ABRASION OF LEFT ELBOW, INITIAL ENCOUNTER: ICD-10-CM

## 2023-01-01 DIAGNOSIS — M25.522 LEFT ELBOW PAIN: ICD-10-CM

## 2023-01-01 DIAGNOSIS — M25.561 PAIN IN BOTH KNEES, UNSPECIFIED CHRONICITY: ICD-10-CM

## 2023-01-01 DIAGNOSIS — S32.030A CLOSED COMPRESSION FRACTURE OF L3 VERTEBRA, INITIAL ENCOUNTER: Primary | ICD-10-CM

## 2023-01-01 DIAGNOSIS — M25.562 PAIN IN BOTH KNEES, UNSPECIFIED CHRONICITY: ICD-10-CM

## 2023-01-01 DIAGNOSIS — I21.4 NSTEMI (NON-ST ELEVATED MYOCARDIAL INFARCTION): Primary | ICD-10-CM

## 2023-01-01 DIAGNOSIS — N39.0 URINARY TRACT INFECTION WITHOUT HEMATURIA, SITE UNSPECIFIED: ICD-10-CM

## 2023-01-01 LAB
A-A DO2: 59.5 MMHG
ALBUMIN SERPL-MCNC: 3.5 G/DL (ref 3.5–5.2)
ALBUMIN SERPL-MCNC: 3.6 G/DL (ref 3.5–5.2)
ALBUMIN/GLOB SERPL: 1 G/DL
ALBUMIN/GLOB SERPL: 1.2 G/DL
ALP SERPL-CCNC: 121 U/L (ref 39–117)
ALP SERPL-CCNC: 159 U/L (ref 39–117)
ALT SERPL W P-5'-P-CCNC: 22 U/L (ref 1–33)
ALT SERPL W P-5'-P-CCNC: 55 U/L (ref 1–33)
ANION GAP SERPL CALCULATED.3IONS-SCNC: 10.3 MMOL/L (ref 5–15)
ANION GAP SERPL CALCULATED.3IONS-SCNC: 21.8 MMOL/L (ref 5–15)
APTT PPP: 28 SECONDS (ref 70–100)
ARTERIAL PATENCY WRIST A: ABNORMAL
AST SERPL-CCNC: 103 U/L (ref 1–32)
AST SERPL-CCNC: 34 U/L (ref 1–32)
ATMOSPHERIC PRESS: 731 MMHG
BACTERIA SPEC AEROBE CULT: NORMAL
BACTERIA UR QL AUTO: ABNORMAL /HPF
BASE EXCESS BLDA CALC-SCNC: -0.3 MMOL/L (ref 0–2)
BASOPHILS # BLD AUTO: 0.02 10*3/MM3 (ref 0–0.2)
BASOPHILS # BLD AUTO: 0.04 10*3/MM3 (ref 0–0.2)
BASOPHILS NFR BLD AUTO: 0.2 % (ref 0–1.5)
BASOPHILS NFR BLD AUTO: 0.4 % (ref 0–1.5)
BDY SITE: ABNORMAL
BILIRUB SERPL-MCNC: 1.1 MG/DL (ref 0–1.2)
BILIRUB SERPL-MCNC: 1.2 MG/DL (ref 0–1.2)
BILIRUB UR QL STRIP: NEGATIVE
BUN SERPL-MCNC: 24 MG/DL (ref 8–23)
BUN SERPL-MCNC: 36 MG/DL (ref 8–23)
BUN/CREAT SERPL: 30.4 (ref 7–25)
BUN/CREAT SERPL: 32.4 (ref 7–25)
CALCIUM SPEC-SCNC: 9.4 MG/DL (ref 8.2–9.6)
CALCIUM SPEC-SCNC: 9.4 MG/DL (ref 8.2–9.6)
CHLORIDE SERPL-SCNC: 93 MMOL/L (ref 98–107)
CHLORIDE SERPL-SCNC: 95 MMOL/L (ref 98–107)
CK SERPL-CCNC: 271 U/L (ref 20–180)
CLARITY UR: CLEAR
CO2 SERPL-SCNC: 21.2 MMOL/L (ref 22–29)
CO2 SERPL-SCNC: 31.7 MMOL/L (ref 22–29)
COHGB MFR BLD: 0.9 % (ref 0–2)
COLOR UR: YELLOW
CREAT SERPL-MCNC: 0.79 MG/DL (ref 0.57–1)
CREAT SERPL-MCNC: 1.11 MG/DL (ref 0.57–1)
CRP SERPL-MCNC: 1.52 MG/DL (ref 0–0.5)
D-LACTATE SERPL-SCNC: 3.9 MMOL/L (ref 0.5–2)
D-LACTATE SERPL-SCNC: 5.1 MMOL/L (ref 0.5–2)
DEPRECATED RDW RBC AUTO: 48.6 FL (ref 37–54)
DEPRECATED RDW RBC AUTO: 49.4 FL (ref 37–54)
EGFRCR SERPLBLD CKD-EPI 2021: 45.6 ML/MIN/1.73
EGFRCR SERPLBLD CKD-EPI 2021: 68.6 ML/MIN/1.73
EOSINOPHIL # BLD AUTO: 0.02 10*3/MM3 (ref 0–0.4)
EOSINOPHIL # BLD AUTO: 0.04 10*3/MM3 (ref 0–0.4)
EOSINOPHIL NFR BLD AUTO: 0.2 % (ref 0.3–6.2)
EOSINOPHIL NFR BLD AUTO: 0.5 % (ref 0.3–6.2)
ERYTHROCYTE [DISTWIDTH] IN BLOOD BY AUTOMATED COUNT: 14.4 % (ref 12.3–15.4)
ERYTHROCYTE [DISTWIDTH] IN BLOOD BY AUTOMATED COUNT: 14.4 % (ref 12.3–15.4)
ERYTHROCYTE [SEDIMENTATION RATE] IN BLOOD: 18 MM/HR (ref 0–30)
FLUAV RNA RESP QL NAA+PROBE: NOT DETECTED
FLUBV RNA RESP QL NAA+PROBE: NOT DETECTED
GAS FLOW AIRWAY: 2.5 LPM
GEN 5 2HR TROPONIN T REFLEX: 56 NG/L
GEN 5 2HR TROPONIN T REFLEX: 734 NG/L
GLOBULIN UR ELPH-MCNC: 3 GM/DL
GLOBULIN UR ELPH-MCNC: 3.5 GM/DL
GLUCOSE BLDC GLUCOMTR-MCNC: 177 MG/DL (ref 70–130)
GLUCOSE SERPL-MCNC: 107 MG/DL (ref 65–99)
GLUCOSE SERPL-MCNC: 188 MG/DL (ref 65–99)
GLUCOSE UR STRIP-MCNC: NEGATIVE MG/DL
HCO3 BLDA-SCNC: 25 MMOL/L (ref 22–28)
HCT VFR BLD AUTO: 44.8 % (ref 34–46.6)
HCT VFR BLD AUTO: 46.2 % (ref 34–46.6)
HCT VFR BLD CALC: 45 %
HGB BLD-MCNC: 14.7 G/DL (ref 12–15.9)
HGB BLD-MCNC: 14.8 G/DL (ref 12–15.9)
HGB UR QL STRIP.AUTO: NEGATIVE
HOLD SPECIMEN: NORMAL
HOLD SPECIMEN: NORMAL
HYALINE CASTS UR QL AUTO: ABNORMAL /LPF
IMM GRANULOCYTES # BLD AUTO: 0.04 10*3/MM3 (ref 0–0.05)
IMM GRANULOCYTES # BLD AUTO: 0.12 10*3/MM3 (ref 0–0.05)
IMM GRANULOCYTES NFR BLD AUTO: 0.5 % (ref 0–0.5)
IMM GRANULOCYTES NFR BLD AUTO: 1.3 % (ref 0–0.5)
INHALED O2 CONCENTRATION: 30 %
INR PPP: 1.12 (ref 0.9–1.1)
KETONES UR QL STRIP: NEGATIVE
LEUKOCYTE ESTERASE UR QL STRIP.AUTO: ABNORMAL
LYMPHOCYTES # BLD AUTO: 1.14 10*3/MM3 (ref 0.7–3.1)
LYMPHOCYTES # BLD AUTO: 1.27 10*3/MM3 (ref 0.7–3.1)
LYMPHOCYTES NFR BLD AUTO: 13.3 % (ref 19.6–45.3)
LYMPHOCYTES NFR BLD AUTO: 13.3 % (ref 19.6–45.3)
Lab: ABNORMAL
MAGNESIUM SERPL-MCNC: 1.7 MG/DL (ref 1.7–2.3)
MCH RBC QN AUTO: 30.3 PG (ref 26.6–33)
MCH RBC QN AUTO: 30.3 PG (ref 26.6–33)
MCHC RBC AUTO-ENTMCNC: 32 G/DL (ref 31.5–35.7)
MCHC RBC AUTO-ENTMCNC: 32.8 G/DL (ref 31.5–35.7)
MCV RBC AUTO: 92.4 FL (ref 79–97)
MCV RBC AUTO: 94.5 FL (ref 79–97)
METHGB BLD QL: 0.3 % (ref 0–1.5)
MODALITY: ABNORMAL
MONOCYTES # BLD AUTO: 0.31 10*3/MM3 (ref 0.1–0.9)
MONOCYTES # BLD AUTO: 0.69 10*3/MM3 (ref 0.1–0.9)
MONOCYTES NFR BLD AUTO: 3.2 % (ref 5–12)
MONOCYTES NFR BLD AUTO: 8.1 % (ref 5–12)
MUCOUS THREADS URNS QL MICRO: ABNORMAL /HPF
NEUTROPHILS NFR BLD AUTO: 6.61 10*3/MM3 (ref 1.7–7)
NEUTROPHILS NFR BLD AUTO: 7.82 10*3/MM3 (ref 1.7–7)
NEUTROPHILS NFR BLD AUTO: 77.4 % (ref 42.7–76)
NEUTROPHILS NFR BLD AUTO: 81.6 % (ref 42.7–76)
NITRITE UR QL STRIP: NEGATIVE
NOTE: ABNORMAL
NRBC BLD AUTO-RTO: 0 /100 WBC (ref 0–0.2)
NRBC BLD AUTO-RTO: 0 /100 WBC (ref 0–0.2)
OXYHGB MFR BLDV: 96.5 % (ref 94–99)
PCO2 BLDA: 42 MM HG (ref 35–45)
PCO2 TEMP ADJ BLD: ABNORMAL MM[HG]
PH BLDA: 7.38 PH UNITS (ref 7.35–7.45)
PH UR STRIP.AUTO: <=5 [PH] (ref 5–8)
PH, TEMP CORRECTED: ABNORMAL
PLATELET # BLD AUTO: 204 10*3/MM3 (ref 140–450)
PLATELET # BLD AUTO: 218 10*3/MM3 (ref 140–450)
PMV BLD AUTO: 11 FL (ref 6–12)
PMV BLD AUTO: 11.3 FL (ref 6–12)
PO2 BLDA: 98.9 MM HG (ref 75–100)
PO2 TEMP ADJ BLD: ABNORMAL MM[HG]
POTASSIUM SERPL-SCNC: 4.4 MMOL/L (ref 3.5–5.2)
POTASSIUM SERPL-SCNC: 4.7 MMOL/L (ref 3.5–5.2)
PROCALCITONIN SERPL-MCNC: 0.08 NG/ML (ref 0–0.25)
PROT SERPL-MCNC: 6.6 G/DL (ref 6–8.5)
PROT SERPL-MCNC: 7 G/DL (ref 6–8.5)
PROT UR QL STRIP: NEGATIVE
PROTHROMBIN TIME: 15 SECONDS (ref 12.3–15.1)
RBC # BLD AUTO: 4.85 10*6/MM3 (ref 3.77–5.28)
RBC # BLD AUTO: 4.89 10*6/MM3 (ref 3.77–5.28)
RBC # UR STRIP: ABNORMAL /HPF
REF LAB TEST METHOD: ABNORMAL
SAO2 % BLDCOA: 97.6 % (ref 94–100)
SARS-COV-2 RNA RESP QL NAA+PROBE: NOT DETECTED
SODIUM SERPL-SCNC: 136 MMOL/L (ref 136–145)
SODIUM SERPL-SCNC: 137 MMOL/L (ref 136–145)
SP GR UR STRIP: 1.02 (ref 1–1.03)
SQUAMOUS #/AREA URNS HPF: ABNORMAL /HPF
TROPONIN T DELTA: -3 NG/L
TROPONIN T DELTA: 551 NG/L
TROPONIN T SERPL HS-MCNC: 183 NG/L
TROPONIN T SERPL HS-MCNC: 59 NG/L
UFH PPP CHRO-ACNC: 0.1 IU/ML (ref 0.3–0.7)
UROBILINOGEN UR QL STRIP: ABNORMAL
VENTILATOR MODE: ABNORMAL
WBC # UR STRIP: ABNORMAL /HPF
WBC NRBC COR # BLD: 8.54 10*3/MM3 (ref 3.4–10.8)
WBC NRBC COR # BLD: 9.58 10*3/MM3 (ref 3.4–10.8)
WHOLE BLOOD HOLD COAG: NORMAL
WHOLE BLOOD HOLD COAG: NORMAL
WHOLE BLOOD HOLD SPECIMEN: NORMAL
WHOLE BLOOD HOLD SPECIMEN: NORMAL

## 2023-01-01 PROCEDURE — 25010000002 HEPARIN (PORCINE) PER 1000 UNITS: Performed by: EMERGENCY MEDICINE

## 2023-01-01 PROCEDURE — 73502 X-RAY EXAM HIP UNI 2-3 VIEWS: CPT

## 2023-01-01 PROCEDURE — 36415 COLL VENOUS BLD VENIPUNCTURE: CPT

## 2023-01-01 PROCEDURE — 85652 RBC SED RATE AUTOMATED: CPT | Performed by: EMERGENCY MEDICINE

## 2023-01-01 PROCEDURE — 85520 HEPARIN ASSAY: CPT | Performed by: EMERGENCY MEDICINE

## 2023-01-01 PROCEDURE — 82550 ASSAY OF CK (CPK): CPT | Performed by: INTERNAL MEDICINE

## 2023-01-01 PROCEDURE — 84145 PROCALCITONIN (PCT): CPT | Performed by: EMERGENCY MEDICINE

## 2023-01-01 PROCEDURE — 83050 HGB METHEMOGLOBIN QUAN: CPT

## 2023-01-01 PROCEDURE — 82375 ASSAY CARBOXYHB QUANT: CPT

## 2023-01-01 PROCEDURE — 25010000002 MORPHINE PER 10 MG: Performed by: INTERNAL MEDICINE

## 2023-01-01 PROCEDURE — 73030 X-RAY EXAM OF SHOULDER: CPT

## 2023-01-01 PROCEDURE — 73080 X-RAY EXAM OF ELBOW: CPT

## 2023-01-01 PROCEDURE — 84484 ASSAY OF TROPONIN QUANT: CPT

## 2023-01-01 PROCEDURE — 70450 CT HEAD/BRAIN W/O DYE: CPT

## 2023-01-01 PROCEDURE — 36600 WITHDRAWAL OF ARTERIAL BLOOD: CPT

## 2023-01-01 PROCEDURE — 99285 EMERGENCY DEPT VISIT HI MDM: CPT

## 2023-01-01 PROCEDURE — 87086 URINE CULTURE/COLONY COUNT: CPT

## 2023-01-01 PROCEDURE — 93306 TTE W/DOPPLER COMPLETE: CPT | Performed by: INTERNAL MEDICINE

## 2023-01-01 PROCEDURE — 87040 BLOOD CULTURE FOR BACTERIA: CPT | Performed by: EMERGENCY MEDICINE

## 2023-01-01 PROCEDURE — 73552 X-RAY EXAM OF FEMUR 2/>: CPT

## 2023-01-01 PROCEDURE — 85025 COMPLETE CBC W/AUTO DIFF WBC: CPT

## 2023-01-01 PROCEDURE — 99284 EMERGENCY DEPT VISIT MOD MDM: CPT

## 2023-01-01 PROCEDURE — 87636 SARSCOV2 & INF A&B AMP PRB: CPT

## 2023-01-01 PROCEDURE — 83735 ASSAY OF MAGNESIUM: CPT | Performed by: EMERGENCY MEDICINE

## 2023-01-01 PROCEDURE — 75635 CT ANGIO ABDOMINAL ARTERIES: CPT

## 2023-01-01 PROCEDURE — 99223 1ST HOSP IP/OBS HIGH 75: CPT | Performed by: INTERNAL MEDICINE

## 2023-01-01 PROCEDURE — 93306 TTE W/DOPPLER COMPLETE: CPT

## 2023-01-01 PROCEDURE — 93005 ELECTROCARDIOGRAM TRACING: CPT | Performed by: EMERGENCY MEDICINE

## 2023-01-01 PROCEDURE — 85730 THROMBOPLASTIN TIME PARTIAL: CPT | Performed by: EMERGENCY MEDICINE

## 2023-01-01 PROCEDURE — 86140 C-REACTIVE PROTEIN: CPT | Performed by: EMERGENCY MEDICINE

## 2023-01-01 PROCEDURE — 71045 X-RAY EXAM CHEST 1 VIEW: CPT

## 2023-01-01 PROCEDURE — 82948 REAGENT STRIP/BLOOD GLUCOSE: CPT

## 2023-01-01 PROCEDURE — 80053 COMPREHEN METABOLIC PANEL: CPT | Performed by: EMERGENCY MEDICINE

## 2023-01-01 PROCEDURE — 84484 ASSAY OF TROPONIN QUANT: CPT | Performed by: EMERGENCY MEDICINE

## 2023-01-01 PROCEDURE — 25010000002 ENOXAPARIN PER 10 MG: Performed by: INTERNAL MEDICINE

## 2023-01-01 PROCEDURE — 25010000002 CEFTRIAXONE SODIUM-DEXTROSE 1-3.74 GM-%(50ML) RECONSTITUTED SOLUTION: Performed by: INTERNAL MEDICINE

## 2023-01-01 PROCEDURE — 83605 ASSAY OF LACTIC ACID: CPT | Performed by: EMERGENCY MEDICINE

## 2023-01-01 PROCEDURE — 80053 COMPREHEN METABOLIC PANEL: CPT

## 2023-01-01 PROCEDURE — 99222 1ST HOSP IP/OBS MODERATE 55: CPT | Performed by: INTERNAL MEDICINE

## 2023-01-01 PROCEDURE — 25510000001 IOPAMIDOL 61 % SOLUTION: Performed by: EMERGENCY MEDICINE

## 2023-01-01 PROCEDURE — 72131 CT LUMBAR SPINE W/O DYE: CPT

## 2023-01-01 PROCEDURE — 72125 CT NECK SPINE W/O DYE: CPT

## 2023-01-01 PROCEDURE — 25010000002 ONDANSETRON PER 1 MG: Performed by: EMERGENCY MEDICINE

## 2023-01-01 PROCEDURE — 85025 COMPLETE CBC W/AUTO DIFF WBC: CPT | Performed by: EMERGENCY MEDICINE

## 2023-01-01 PROCEDURE — 85610 PROTHROMBIN TIME: CPT | Performed by: EMERGENCY MEDICINE

## 2023-01-01 PROCEDURE — 81001 URINALYSIS AUTO W/SCOPE: CPT

## 2023-01-01 PROCEDURE — 82805 BLOOD GASES W/O2 SATURATION: CPT

## 2023-01-01 PROCEDURE — 73562 X-RAY EXAM OF KNEE 3: CPT

## 2023-01-01 RX ORDER — POLYETHYLENE GLYCOL 3350 17 G/17G
17 POWDER, FOR SOLUTION ORAL DAILY PRN
Status: DISCONTINUED | OUTPATIENT
Start: 2023-01-01 | End: 2023-01-01 | Stop reason: HOSPADM

## 2023-01-01 RX ORDER — ENOXAPARIN SODIUM 100 MG/ML
1 INJECTION SUBCUTANEOUS EVERY 24 HOURS
Status: DISCONTINUED | OUTPATIENT
Start: 2023-01-01 | End: 2023-01-01 | Stop reason: HOSPADM

## 2023-01-01 RX ORDER — LORAZEPAM 2 MG/ML
0.5 INJECTION INTRAMUSCULAR EVERY 4 HOURS PRN
Status: DISCONTINUED | OUTPATIENT
Start: 2023-01-01 | End: 2023-01-01 | Stop reason: HOSPADM

## 2023-01-01 RX ORDER — SODIUM CHLORIDE 0.9 % (FLUSH) 0.9 %
10 SYRINGE (ML) INJECTION AS NEEDED
Status: DISCONTINUED | OUTPATIENT
Start: 2023-01-01 | End: 2023-01-01 | Stop reason: HOSPADM

## 2023-01-01 RX ORDER — BISACODYL 10 MG
10 SUPPOSITORY, RECTAL RECTAL DAILY PRN
Status: DISCONTINUED | OUTPATIENT
Start: 2023-01-01 | End: 2023-01-01 | Stop reason: HOSPADM

## 2023-01-01 RX ORDER — HEPARIN SODIUM 1000 [USP'U]/ML
60 INJECTION, SOLUTION INTRAVENOUS; SUBCUTANEOUS ONCE
Status: COMPLETED | OUTPATIENT
Start: 2023-01-01 | End: 2023-01-01

## 2023-01-01 RX ORDER — SODIUM CHLORIDE 9 MG/ML
40 INJECTION, SOLUTION INTRAVENOUS AS NEEDED
Status: DISCONTINUED | OUTPATIENT
Start: 2023-01-01 | End: 2023-01-01 | Stop reason: HOSPADM

## 2023-01-01 RX ORDER — ASPIRIN 81 MG/1
81 TABLET ORAL DAILY
Status: DISCONTINUED | OUTPATIENT
Start: 2023-06-05 | End: 2023-01-01 | Stop reason: HOSPADM

## 2023-01-01 RX ORDER — LEVOTHYROXINE SODIUM 0.12 MG/1
125 TABLET ORAL
Status: DISCONTINUED | OUTPATIENT
Start: 2023-01-01 | End: 2023-01-01 | Stop reason: HOSPADM

## 2023-01-01 RX ORDER — HEPARIN SODIUM 1000 [USP'U]/ML
50 INJECTION, SOLUTION INTRAVENOUS; SUBCUTANEOUS AS NEEDED
Status: DISCONTINUED | OUTPATIENT
Start: 2023-01-01 | End: 2023-01-01

## 2023-01-01 RX ORDER — CEFTRIAXONE 1 G/50ML
1 INJECTION, SOLUTION INTRAVENOUS EVERY 24 HOURS
Status: DISCONTINUED | OUTPATIENT
Start: 2023-01-01 | End: 2023-01-01 | Stop reason: HOSPADM

## 2023-01-01 RX ORDER — SODIUM CHLORIDE 9 MG/ML
100 INJECTION, SOLUTION INTRAVENOUS CONTINUOUS
Status: DISCONTINUED | OUTPATIENT
Start: 2023-01-01 | End: 2023-01-01 | Stop reason: HOSPADM

## 2023-01-01 RX ORDER — AMOXICILLIN 250 MG
2 CAPSULE ORAL 2 TIMES DAILY
Status: DISCONTINUED | OUTPATIENT
Start: 2023-01-01 | End: 2023-01-01 | Stop reason: HOSPADM

## 2023-01-01 RX ORDER — ACETAMINOPHEN 160 MG/5ML
650 SOLUTION ORAL EVERY 4 HOURS PRN
Status: DISCONTINUED | OUTPATIENT
Start: 2023-01-01 | End: 2023-01-01 | Stop reason: HOSPADM

## 2023-01-01 RX ORDER — BISACODYL 5 MG/1
5 TABLET, DELAYED RELEASE ORAL DAILY PRN
Status: DISCONTINUED | OUTPATIENT
Start: 2023-01-01 | End: 2023-01-01 | Stop reason: HOSPADM

## 2023-01-01 RX ORDER — SODIUM CHLORIDE 0.9 % (FLUSH) 0.9 %
10 SYRINGE (ML) INJECTION EVERY 12 HOURS SCHEDULED
Status: DISCONTINUED | OUTPATIENT
Start: 2023-01-01 | End: 2023-01-01 | Stop reason: HOSPADM

## 2023-01-01 RX ORDER — DILTIAZEM HYDROCHLORIDE 5 MG/ML
10 INJECTION INTRAVENOUS ONCE
Status: COMPLETED | OUTPATIENT
Start: 2023-01-01 | End: 2023-01-01

## 2023-01-01 RX ORDER — HEPARIN SODIUM 1000 [USP'U]/ML
25 INJECTION, SOLUTION INTRAVENOUS; SUBCUTANEOUS AS NEEDED
Status: DISCONTINUED | OUTPATIENT
Start: 2023-01-01 | End: 2023-01-01

## 2023-01-01 RX ORDER — ONDANSETRON 2 MG/ML
4 INJECTION INTRAMUSCULAR; INTRAVENOUS ONCE
Status: COMPLETED | OUTPATIENT
Start: 2023-01-01 | End: 2023-01-01

## 2023-01-01 RX ORDER — ACETAMINOPHEN 325 MG/1
975 TABLET ORAL ONCE
Status: COMPLETED | OUTPATIENT
Start: 2023-01-01 | End: 2023-01-01

## 2023-01-01 RX ORDER — ONDANSETRON 2 MG/ML
4 INJECTION INTRAMUSCULAR; INTRAVENOUS EVERY 6 HOURS PRN
Status: DISCONTINUED | OUTPATIENT
Start: 2023-01-01 | End: 2023-01-01 | Stop reason: HOSPADM

## 2023-01-01 RX ORDER — MORPHINE SULFATE 2 MG/ML
1 INJECTION, SOLUTION INTRAMUSCULAR; INTRAVENOUS EVERY 4 HOURS PRN
Status: DISCONTINUED | OUTPATIENT
Start: 2023-01-01 | End: 2023-01-01 | Stop reason: HOSPADM

## 2023-01-01 RX ORDER — CEPHALEXIN 500 MG/1
500 CAPSULE ORAL 2 TIMES DAILY
Qty: 10 CAPSULE | Refills: 0 | Status: ON HOLD | OUTPATIENT
Start: 2023-01-01 | End: 2023-01-01

## 2023-01-01 RX ORDER — CEPHALEXIN 250 MG/1
500 CAPSULE ORAL ONCE
Status: COMPLETED | OUTPATIENT
Start: 2023-01-01 | End: 2023-01-01

## 2023-01-01 RX ORDER — METOPROLOL TARTRATE 5 MG/5ML
2.5 INJECTION INTRAVENOUS EVERY 6 HOURS
Status: DISCONTINUED | OUTPATIENT
Start: 2023-01-01 | End: 2023-01-01 | Stop reason: HOSPADM

## 2023-01-01 RX ORDER — ACETAMINOPHEN 325 MG/1
650 TABLET ORAL EVERY 4 HOURS PRN
Status: DISCONTINUED | OUTPATIENT
Start: 2023-01-01 | End: 2023-01-01 | Stop reason: HOSPADM

## 2023-01-01 RX ORDER — PANTOPRAZOLE SODIUM 40 MG/1
40 TABLET, DELAYED RELEASE ORAL
Status: DISCONTINUED | OUTPATIENT
Start: 2023-01-01 | End: 2023-01-01 | Stop reason: HOSPADM

## 2023-01-01 RX ORDER — METOPROLOL TARTRATE 5 MG/5ML
2.5 INJECTION INTRAVENOUS ONCE
Status: COMPLETED | OUTPATIENT
Start: 2023-01-01 | End: 2023-01-01

## 2023-01-01 RX ORDER — ACETAMINOPHEN 650 MG/1
650 SUPPOSITORY RECTAL EVERY 4 HOURS PRN
Status: DISCONTINUED | OUTPATIENT
Start: 2023-01-01 | End: 2023-01-01 | Stop reason: HOSPADM

## 2023-01-01 RX ORDER — ASPIRIN 325 MG
325 TABLET, DELAYED RELEASE (ENTERIC COATED) ORAL ONCE
Status: DISCONTINUED | OUTPATIENT
Start: 2023-01-01 | End: 2023-01-01

## 2023-01-01 RX ORDER — ASPIRIN 300 MG/1
300 SUPPOSITORY RECTAL ONCE
Status: COMPLETED | OUTPATIENT
Start: 2023-01-01 | End: 2023-01-01

## 2023-01-01 RX ORDER — METOPROLOL SUCCINATE 25 MG/1
50 TABLET, EXTENDED RELEASE ORAL
Status: DISCONTINUED | OUTPATIENT
Start: 2023-01-01 | End: 2023-01-01 | Stop reason: HOSPADM

## 2023-01-01 RX ORDER — HEPARIN SODIUM 10000 [USP'U]/100ML
12 INJECTION, SOLUTION INTRAVENOUS
Status: DISCONTINUED | OUTPATIENT
Start: 2023-01-01 | End: 2023-01-01

## 2023-01-01 RX ADMIN — METOPROLOL TARTRATE 2.5 MG: 5 INJECTION, SOLUTION INTRAVENOUS at 10:04

## 2023-01-01 RX ADMIN — ACETAMINOPHEN 975 MG: 325 TABLET, FILM COATED ORAL at 22:45

## 2023-01-01 RX ADMIN — HEPARIN SODIUM 3210 UNITS: 1000 INJECTION INTRAVENOUS; SUBCUTANEOUS at 04:53

## 2023-01-01 RX ADMIN — ONDANSETRON 4 MG: 2 SOLUTION INTRAMUSCULAR; INTRAVENOUS at 04:50

## 2023-01-01 RX ADMIN — SODIUM CHLORIDE 500 ML: 9 INJECTION, SOLUTION INTRAVENOUS at 08:18

## 2023-01-01 RX ADMIN — CEPHALEXIN 500 MG: 250 CAPSULE ORAL at 00:57

## 2023-01-01 RX ADMIN — SODIUM CHLORIDE 100 ML/HR: 9 INJECTION, SOLUTION INTRAVENOUS at 14:16

## 2023-01-01 RX ADMIN — MORPHINE SULFATE 1 MG: 2 INJECTION, SOLUTION INTRAMUSCULAR; INTRAVENOUS at 14:12

## 2023-01-01 RX ADMIN — Medication 10 ML: at 11:00

## 2023-01-01 RX ADMIN — HEPARIN SODIUM AND DEXTROSE 12 UNITS/KG/HR: 10000; 5 INJECTION INTRAVENOUS at 04:56

## 2023-01-01 RX ADMIN — METOPROLOL TARTRATE 2.5 MG: 1 INJECTION, SOLUTION INTRAVENOUS at 13:31

## 2023-01-01 RX ADMIN — IOPAMIDOL 200 ML: 612 INJECTION, SOLUTION INTRAVENOUS at 04:48

## 2023-01-01 RX ADMIN — ASPIRIN 300 MG: 300 SUPPOSITORY RECTAL at 10:05

## 2023-01-01 RX ADMIN — LEVOTHYROXINE SODIUM 125 MCG: 125 TABLET ORAL at 11:15

## 2023-01-01 RX ADMIN — SODIUM CHLORIDE 1000 ML: 9 INJECTION, SOLUTION INTRAVENOUS at 01:46

## 2023-01-01 RX ADMIN — SODIUM CHLORIDE 500 ML: 9 INJECTION, SOLUTION INTRAVENOUS at 12:00

## 2023-01-01 RX ADMIN — ENOXAPARIN SODIUM 60 MG: 100 INJECTION SUBCUTANEOUS at 13:13

## 2023-01-01 RX ADMIN — CEFTRIAXONE 1 G: 1 INJECTION, SOLUTION INTRAVENOUS at 13:13

## 2023-01-01 RX ADMIN — DILTIAZEM HYDROCHLORIDE 10 MG: 5 INJECTION INTRAVENOUS at 01:46

## 2023-01-01 RX ADMIN — SODIUM CHLORIDE 100 ML/HR: 9 INJECTION, SOLUTION INTRAVENOUS at 09:35

## 2023-01-07 ENCOUNTER — APPOINTMENT (OUTPATIENT)
Dept: GENERAL RADIOLOGY | Facility: HOSPITAL | Age: 88
End: 2023-01-07
Payer: MEDICARE

## 2023-01-07 ENCOUNTER — HOSPITAL ENCOUNTER (EMERGENCY)
Facility: HOSPITAL | Age: 88
Discharge: NURSING, REHAB OR LONG-TERM CARE | End: 2023-01-07
Attending: EMERGENCY MEDICINE | Admitting: EMERGENCY MEDICINE
Payer: MEDICARE

## 2023-01-07 VITALS
WEIGHT: 149 LBS | HEART RATE: 91 BPM | BODY MASS INDEX: 27.42 KG/M2 | HEIGHT: 62 IN | SYSTOLIC BLOOD PRESSURE: 107 MMHG | DIASTOLIC BLOOD PRESSURE: 51 MMHG | RESPIRATION RATE: 16 BRPM | OXYGEN SATURATION: 92 % | TEMPERATURE: 97.7 F

## 2023-01-07 DIAGNOSIS — I50.33 ACUTE ON CHRONIC DIASTOLIC CONGESTIVE HEART FAILURE: Primary | ICD-10-CM

## 2023-01-07 LAB
ALBUMIN SERPL-MCNC: 3.8 G/DL (ref 3.5–5.2)
ALBUMIN/GLOB SERPL: 1.2 G/DL
ALP SERPL-CCNC: 130 U/L (ref 39–117)
ALT SERPL W P-5'-P-CCNC: 28 U/L (ref 1–33)
ANION GAP SERPL CALCULATED.3IONS-SCNC: 7.5 MMOL/L (ref 5–15)
AST SERPL-CCNC: 40 U/L (ref 1–32)
BASOPHILS # BLD AUTO: 0.03 10*3/MM3 (ref 0–0.2)
BASOPHILS NFR BLD AUTO: 0.5 % (ref 0–1.5)
BILIRUB SERPL-MCNC: 0.6 MG/DL (ref 0–1.2)
BUN SERPL-MCNC: 22 MG/DL (ref 8–23)
BUN/CREAT SERPL: 22.7 (ref 7–25)
CALCIUM SPEC-SCNC: 9.6 MG/DL (ref 8.2–9.6)
CHLORIDE SERPL-SCNC: 95 MMOL/L (ref 98–107)
CO2 SERPL-SCNC: 36.5 MMOL/L (ref 22–29)
CREAT SERPL-MCNC: 0.97 MG/DL (ref 0.57–1)
DEPRECATED RDW RBC AUTO: 47.4 FL (ref 37–54)
EGFRCR SERPLBLD CKD-EPI 2021: 53.6 ML/MIN/1.73
EOSINOPHIL # BLD AUTO: 0.09 10*3/MM3 (ref 0–0.4)
EOSINOPHIL NFR BLD AUTO: 1.5 % (ref 0.3–6.2)
ERYTHROCYTE [DISTWIDTH] IN BLOOD BY AUTOMATED COUNT: 14.3 % (ref 12.3–15.4)
GLOBULIN UR ELPH-MCNC: 3.1 GM/DL
GLUCOSE SERPL-MCNC: 108 MG/DL (ref 65–99)
HCT VFR BLD AUTO: 40.6 % (ref 34–46.6)
HGB BLD-MCNC: 13.1 G/DL (ref 12–15.9)
HOLD SPECIMEN: NORMAL
HOLD SPECIMEN: NORMAL
IMM GRANULOCYTES # BLD AUTO: 0.03 10*3/MM3 (ref 0–0.05)
IMM GRANULOCYTES NFR BLD AUTO: 0.5 % (ref 0–0.5)
LYMPHOCYTES # BLD AUTO: 1.44 10*3/MM3 (ref 0.7–3.1)
LYMPHOCYTES NFR BLD AUTO: 24.8 % (ref 19.6–45.3)
MCH RBC QN AUTO: 29.1 PG (ref 26.6–33)
MCHC RBC AUTO-ENTMCNC: 32.3 G/DL (ref 31.5–35.7)
MCV RBC AUTO: 90.2 FL (ref 79–97)
MONOCYTES # BLD AUTO: 0.49 10*3/MM3 (ref 0.1–0.9)
MONOCYTES NFR BLD AUTO: 8.4 % (ref 5–12)
NEUTROPHILS NFR BLD AUTO: 3.73 10*3/MM3 (ref 1.7–7)
NEUTROPHILS NFR BLD AUTO: 64.3 % (ref 42.7–76)
NRBC BLD AUTO-RTO: 0 /100 WBC (ref 0–0.2)
NT-PROBNP SERPL-MCNC: 2008 PG/ML (ref 0–1800)
PLATELET # BLD AUTO: 233 10*3/MM3 (ref 140–450)
PMV BLD AUTO: 10.4 FL (ref 6–12)
POTASSIUM SERPL-SCNC: 3.8 MMOL/L (ref 3.5–5.2)
PROT SERPL-MCNC: 6.9 G/DL (ref 6–8.5)
RBC # BLD AUTO: 4.5 10*6/MM3 (ref 3.77–5.28)
SODIUM SERPL-SCNC: 139 MMOL/L (ref 136–145)
TROPONIN T SERPL-MCNC: <0.01 NG/ML (ref 0–0.03)
WBC NRBC COR # BLD: 5.81 10*3/MM3 (ref 3.4–10.8)
WHOLE BLOOD HOLD COAG: NORMAL
WHOLE BLOOD HOLD SPECIMEN: NORMAL

## 2023-01-07 PROCEDURE — 85025 COMPLETE CBC W/AUTO DIFF WBC: CPT | Performed by: PHYSICIAN ASSISTANT

## 2023-01-07 PROCEDURE — 96374 THER/PROPH/DIAG INJ IV PUSH: CPT

## 2023-01-07 PROCEDURE — 83880 ASSAY OF NATRIURETIC PEPTIDE: CPT | Performed by: PHYSICIAN ASSISTANT

## 2023-01-07 PROCEDURE — 93005 ELECTROCARDIOGRAM TRACING: CPT | Performed by: PHYSICIAN ASSISTANT

## 2023-01-07 PROCEDURE — 71045 X-RAY EXAM CHEST 1 VIEW: CPT

## 2023-01-07 PROCEDURE — 99284 EMERGENCY DEPT VISIT MOD MDM: CPT

## 2023-01-07 PROCEDURE — 84484 ASSAY OF TROPONIN QUANT: CPT | Performed by: PHYSICIAN ASSISTANT

## 2023-01-07 PROCEDURE — 25010000002 FUROSEMIDE PER 20 MG: Performed by: PHYSICIAN ASSISTANT

## 2023-01-07 PROCEDURE — 80053 COMPREHEN METABOLIC PANEL: CPT | Performed by: PHYSICIAN ASSISTANT

## 2023-01-07 RX ORDER — FUROSEMIDE 10 MG/ML
40 INJECTION INTRAMUSCULAR; INTRAVENOUS ONCE
Status: COMPLETED | OUTPATIENT
Start: 2023-01-07 | End: 2023-01-07

## 2023-01-07 RX ORDER — SODIUM CHLORIDE 0.9 % (FLUSH) 0.9 %
10 SYRINGE (ML) INJECTION AS NEEDED
Status: DISCONTINUED | OUTPATIENT
Start: 2023-01-07 | End: 2023-01-08 | Stop reason: HOSPADM

## 2023-01-07 RX ADMIN — FUROSEMIDE 40 MG: 10 INJECTION, SOLUTION INTRAMUSCULAR; INTRAVENOUS at 21:14

## 2023-01-07 NOTE — ED NOTES
Attempted IV access x2, unsuccessful. MARLO Bell to attempt IV access after pt returns from the bathroom.

## 2023-01-08 NOTE — ED PROVIDER NOTES
Subjective  History of Present Illness:    Chief Complaint: Shortness of breath  History of Present Illness: 89-year-old female with a past medical history of constipation, congestive heart failure, and hypertension presents to the ED today with intermittent SOB.  Patient is currently at a nursing residence called [facility].  Son is present and reports noticing a decrease in her appetite during visitation.  Patient reports shortness of breath has been ongoing for years.  She reports having intermittent palpitations that subside on their own at rest.  Patient denies chest pain, bowel difficulties, urinary incontinence.  Patient also states she has had difficulty in her normal daily activities including ambulating.  Patient and family also reports that she had abnormal chest x-rays, review of the chest x-rays showed pulmonary edema unchanged and stable, she had a chest x-ray on January 3 and a repeat on January 6 the reports are in the record  Onset: Last night  Duration: Intermittent and subside with rest  Exacerbating / Alleviating factors: Physical exertion  Associated symptoms: Loss of appetite, decrease in bowel frequency, and a decrease in energy levels.      Nurses Notes reviewed and agree, including vitals, allergies, social history and prior medical history.     REVIEW OF SYSTEMS: All systems reviewed and not pertinent unless noted.    Review of Systems   Respiratory: Positive for shortness of breath.    Neurological: Positive for weakness.   All other systems reviewed and are negative.      Past Medical History:   Diagnosis Date   • Constipation    • Disease of thyroid gland    • Hypertension        Allergies:    Patient has no known allergies.      Past Surgical History:   Procedure Laterality Date   • CHOLECYSTECTOMY     • THYROID SURGERY           Social History     Socioeconomic History   • Marital status:    Tobacco Use   • Smoking status: Never   • Smokeless tobacco: Never   Substance and  "Sexual Activity   • Alcohol use: No   • Drug use: Never   • Sexual activity: Defer         Family History   Problem Relation Age of Onset   • Arthritis Mother    • Stroke Mother    • Heart attack Other    • Cancer Other        Objective  Physical Exam:  /51   Pulse 91   Temp 97.7 °F (36.5 °C)   Resp 16   Ht 157.5 cm (62\")   Wt 67.6 kg (149 lb)   SpO2 92%   BMI 27.25 kg/m²      Physical Exam  Vitals and nursing note reviewed.   Constitutional:       Appearance: She is well-developed.   Cardiovascular:      Rate and Rhythm: Normal rate and regular rhythm.   Pulmonary:      Breath sounds: Rhonchi present.   Abdominal:      General: Bowel sounds are normal.      Palpations: Abdomen is soft.   Musculoskeletal:         General: Normal range of motion.      Cervical back: Normal range of motion and neck supple.   Skin:     General: Skin is warm and dry.   Neurological:      Mental Status: She is alert and oriented to person, place, and time.      Deep Tendon Reflexes: Reflexes are normal and symmetric.           Procedures    ED Course:    ED Course as of 01/08/23 1304   Sat Jan 07, 2023 2030 Patient requiring 2 L of oxygen nasal cannula, it was applied when her saturations dropped to 88% while sleeping, she was also given 40 of Lasix for CHF exacerbation, will reevaluate after Lasix [CS]   2326 Monitor the patient closely, wean the oxygen, she stayed at 90% she had a few episodes of dropping to 89 to 90% but with her age and her history of CHF she is resting comfortably and in no acute distress this is not totally unexpected, she does not need admission to the hospital discussed this with the patient's son and he agrees, he does not want to disrupt her rest or admit her if not necessary, she will be transferred back to the nursing home [CS]      ED Course User Index  [CS] Jaden Kim Jr., ELISE       Lab Results (last 24 hours)     Procedure Component Value Units Date/Time    CBC & Differential " [[id number]]  (Normal) Collected: 01/07/23 1940    Specimen: Blood Updated: 01/07/23 1952    Narrative:      The following orders were created for panel order CBC & Differential.  Procedure                               Abnormality         Status                     ---------                               -----------         ------                     CBC Auto Differential[[id number]]        Normal              Final result                 Please view results for these tests on the individual orders.    Comprehensive Metabolic Panel [[id number]]  (Abnormal) Collected: 01/07/23 1940    Specimen: Blood Updated: 01/07/23 2009     Glucose 108 mg/dL      BUN 22 mg/dL      Creatinine 0.97 mg/dL      Sodium 139 mmol/L      Potassium 3.8 mmol/L      Chloride 95 mmol/L      CO2 36.5 mmol/L      Calcium 9.6 mg/dL      Total Protein 6.9 g/dL      Albumin 3.8 g/dL      ALT (SGPT) 28 U/L      AST (SGOT) 40 U/L      Alkaline Phosphatase 130 U/L      Total Bilirubin 0.6 mg/dL      Globulin 3.1 gm/dL      A/G Ratio 1.2 g/dL      BUN/Creatinine Ratio 22.7     Anion Gap 7.5 mmol/L      eGFR 53.6 mL/min/1.73      Comment: National Kidney Foundation and American Society of Nephrology (ASN) Task Force recommended calculation based on the Chronic Kidney Disease Epidemiology Collaboration (CKD-EPI) equation refit without adjustment for race.       Narrative:      GFR Normal >60  Chronic Kidney Disease <60  Kidney Failure <15    The GFR formula is only valid for adults with stable renal function between ages 18 and 70.    BNP [[id number]]  (Abnormal) Collected: 01/07/23 1940    Specimen: Blood Updated: 01/07/23 2013     proBNP 2,008.0 pg/mL     Narrative:      Among patients with dyspnea, NT-proBNP is highly sensitive for the detection of acute congestive heart failure. In addition NT-proBNP of <300 pg/ml effectively rules out acute congestive heart failure with 99% negative predictive value.    Results may be falsely decreased if patient  taking Biotin.      Troponin [[id number]]  (Normal) Collected: 01/07/23 1940    Specimen: Blood Updated: 01/07/23 2012     Troponin T <0.010 ng/mL     Narrative:      Troponin T Reference Range:  <= 0.03 ng/mL-   Negative for AMI  >0.03 ng/mL-     Abnormal for myocardial necrosis.  Clinicians would have to utilize clinical acumen, EKG, Troponin and serial changes to determine if it is an Acute Myocardial Infarction or myocardial injury due to an underlying chronic condition.       Results may be falsely decreased if patient taking Biotin.      CBC Auto Differential [[id number]]  (Normal) Collected: 01/07/23 1940    Specimen: Blood Updated: 01/07/23 1952     WBC 5.81 10*3/mm3      RBC 4.50 10*6/mm3      Hemoglobin 13.1 g/dL      Hematocrit 40.6 %      MCV 90.2 fL      MCH 29.1 pg      MCHC 32.3 g/dL      RDW 14.3 %      RDW-SD 47.4 fl      MPV 10.4 fL      Platelets 233 10*3/mm3      Neutrophil % 64.3 %      Lymphocyte % 24.8 %      Monocyte % 8.4 %      Eosinophil % 1.5 %      Basophil % 0.5 %      Immature Grans % 0.5 %      Neutrophils, Absolute 3.73 10*3/mm3      Lymphocytes, Absolute 1.44 10*3/mm3      Monocytes, Absolute 0.49 10*3/mm3      Eosinophils, Absolute 0.09 10*3/mm3      Basophils, Absolute 0.03 10*3/mm3      Immature Grans, Absolute 0.03 10*3/mm3      nRBC 0.0 /100 WBC            XR Chest 1 View    Result Date: 1/8/2023  PROCEDURE: XR CHEST 1 VW-  INDICATION:  CHF/COPD Protocol  FINDINGS:  A portable view of the chest was obtained.  Comparison is made to a prior exam dated 08/10/2022.   The heart is enlarged. Retrocardiac opacity with lucency is likely a large hiatal hernia. There are increased interstitial markings which are similar to prior. Pulmonary edema not excluded. There is no focal infiltrate. There may be small pleural effusions. No pneumothorax.      Impression: 1. Cardiomegaly. 2. Probable large hiatal hernia. 3. Bilateral interstitial opacities and small effusions. Pulmonary edema not  excluded although there may be a component of chronic increased interstitial markings.  This report was signed and finalized on 1/8/2023 11:27 AM by Mai Perez MD.         Medical Decision Making  89-year-old female with a abnormal chest x-ray reported to the nursing home facility, evaluate the patient at bedside, she is significant past medical history for coronary disease, CHF, dementia, hypertension, hyperlipidemia, differential would include CHF exacerbation, pulmonary edema, pleural effusions, angina.  Labs and a chest ray were performed, did show bilateral pleural effusions, similar but improved to previous chest x-ray 4 months ago, patient did have intermittent drops in her oxygen, but she was resting comfortably throughout our, this is expected with the patient of this age and CHF, to have a drop in her oxygen at night, I discussed this with the patient and the family, they agreed that she would be more comfortable returning to the nursing home.  Discussed all lab results as well as chest x-ray results with the patient and family, also discussed with my supervising physician.    Acute on chronic diastolic congestive heart failure (HCC): acute illness or injury  Amount and/or Complexity of Data Reviewed  Labs: ordered.  Radiology: ordered.  ECG/medicine tests: ordered.      Risk  Prescription drug management.            Final diagnoses:   Acute on chronic diastolic congestive heart failure (HCC)        Jaden Kim Jr., PA-C  01/08/23 3013

## 2023-02-07 ENCOUNTER — HOSPITAL ENCOUNTER (OUTPATIENT)
Facility: HOSPITAL | Age: 88
Setting detail: OBSERVATION
Discharge: HOME | End: 2023-02-09
Attending: EMERGENCY MEDICINE | Admitting: STUDENT IN AN ORGANIZED HEALTH CARE EDUCATION/TRAINING PROGRAM
Payer: MEDICARE

## 2023-02-07 ENCOUNTER — APPOINTMENT (OUTPATIENT)
Dept: GENERAL RADIOLOGY | Facility: HOSPITAL | Age: 88
End: 2023-02-07
Payer: MEDICARE

## 2023-02-07 DIAGNOSIS — I48.20 CHRONIC ATRIAL FIBRILLATION: ICD-10-CM

## 2023-02-07 DIAGNOSIS — I50.9 ACUTE ON CHRONIC CONGESTIVE HEART FAILURE, UNSPECIFIED HEART FAILURE TYPE: Primary | ICD-10-CM

## 2023-02-07 LAB
ALBUMIN SERPL-MCNC: 3.9 G/DL (ref 3.5–5.2)
ALBUMIN/GLOB SERPL: 1.4 G/DL
ALP SERPL-CCNC: 164 U/L (ref 39–117)
ALT SERPL W P-5'-P-CCNC: 91 U/L (ref 1–33)
ANION GAP SERPL CALCULATED.3IONS-SCNC: 9.4 MMOL/L (ref 5–15)
AST SERPL-CCNC: 69 U/L (ref 1–32)
BACTERIA UR QL AUTO: ABNORMAL /HPF
BASOPHILS # BLD AUTO: 0.01 10*3/MM3 (ref 0–0.2)
BASOPHILS NFR BLD AUTO: 0.1 % (ref 0–1.5)
BILIRUB SERPL-MCNC: 1.7 MG/DL (ref 0–1.2)
BILIRUB UR QL STRIP: NEGATIVE
BUN SERPL-MCNC: 37 MG/DL (ref 8–23)
BUN/CREAT SERPL: 34.9 (ref 7–25)
CALCIUM SPEC-SCNC: 9.5 MG/DL (ref 8.2–9.6)
CHLORIDE SERPL-SCNC: 96 MMOL/L (ref 98–107)
CLARITY UR: CLEAR
CO2 SERPL-SCNC: 36.6 MMOL/L (ref 22–29)
COLOR UR: YELLOW
CREAT SERPL-MCNC: 1.06 MG/DL (ref 0.57–1)
DEPRECATED RDW RBC AUTO: 51.3 FL (ref 37–54)
EGFRCR SERPLBLD CKD-EPI 2021: 48.2 ML/MIN/1.73
EOSINOPHIL # BLD AUTO: 0.01 10*3/MM3 (ref 0–0.4)
EOSINOPHIL NFR BLD AUTO: 0.1 % (ref 0.3–6.2)
ERYTHROCYTE [DISTWIDTH] IN BLOOD BY AUTOMATED COUNT: 15.9 % (ref 12.3–15.4)
FLUAV RNA RESP QL NAA+PROBE: NOT DETECTED
FLUBV RNA RESP QL NAA+PROBE: NOT DETECTED
GEN 5 2HR TROPONIN T REFLEX: 25 NG/L
GLOBULIN UR ELPH-MCNC: 2.8 GM/DL
GLUCOSE SERPL-MCNC: 116 MG/DL (ref 65–99)
GLUCOSE UR STRIP-MCNC: NEGATIVE MG/DL
HCT VFR BLD AUTO: 46.8 % (ref 34–46.6)
HGB BLD-MCNC: 15.1 G/DL (ref 12–15.9)
HGB UR QL STRIP.AUTO: NEGATIVE
HOLD SPECIMEN: NORMAL
HOLD SPECIMEN: NORMAL
HYALINE CASTS UR QL AUTO: ABNORMAL /LPF
IMM GRANULOCYTES # BLD AUTO: 0.1 10*3/MM3 (ref 0–0.05)
IMM GRANULOCYTES NFR BLD AUTO: 1 % (ref 0–0.5)
KETONES UR QL STRIP: NEGATIVE
LEUKOCYTE ESTERASE UR QL STRIP.AUTO: ABNORMAL
LYMPHOCYTES # BLD AUTO: 1.04 10*3/MM3 (ref 0.7–3.1)
LYMPHOCYTES NFR BLD AUTO: 10.2 % (ref 19.6–45.3)
MCH RBC QN AUTO: 29.1 PG (ref 26.6–33)
MCHC RBC AUTO-ENTMCNC: 32.3 G/DL (ref 31.5–35.7)
MCV RBC AUTO: 90.2 FL (ref 79–97)
MONOCYTES # BLD AUTO: 0.68 10*3/MM3 (ref 0.1–0.9)
MONOCYTES NFR BLD AUTO: 6.6 % (ref 5–12)
NEUTROPHILS NFR BLD AUTO: 8.4 10*3/MM3 (ref 1.7–7)
NEUTROPHILS NFR BLD AUTO: 82 % (ref 42.7–76)
NITRITE UR QL STRIP: NEGATIVE
NRBC BLD AUTO-RTO: 0 /100 WBC (ref 0–0.2)
NT-PROBNP SERPL-MCNC: 4191 PG/ML (ref 0–1800)
PH UR STRIP.AUTO: 7 [PH] (ref 5–8)
PLATELET # BLD AUTO: 195 10*3/MM3 (ref 140–450)
PMV BLD AUTO: 10.6 FL (ref 6–12)
POTASSIUM SERPL-SCNC: 4.4 MMOL/L (ref 3.5–5.2)
PROT SERPL-MCNC: 6.7 G/DL (ref 6–8.5)
PROT UR QL STRIP: NEGATIVE
RBC # BLD AUTO: 5.19 10*6/MM3 (ref 3.77–5.28)
RBC # UR STRIP: ABNORMAL /HPF
REF LAB TEST METHOD: ABNORMAL
SARS-COV-2 RNA RESP QL NAA+PROBE: NOT DETECTED
SODIUM SERPL-SCNC: 142 MMOL/L (ref 136–145)
SP GR UR STRIP: 1.01 (ref 1–1.03)
SQUAMOUS #/AREA URNS HPF: ABNORMAL /HPF
TROPONIN T DELTA: -1 NG/L
TROPONIN T SERPL HS-MCNC: 26 NG/L
UROBILINOGEN UR QL STRIP: ABNORMAL
WBC # UR STRIP: ABNORMAL /HPF
WBC NRBC COR # BLD: 10.24 10*3/MM3 (ref 3.4–10.8)
WHOLE BLOOD HOLD COAG: NORMAL
WHOLE BLOOD HOLD SPECIMEN: NORMAL

## 2023-02-07 PROCEDURE — G0378 HOSPITAL OBSERVATION PER HR: HCPCS

## 2023-02-07 PROCEDURE — 71045 X-RAY EXAM CHEST 1 VIEW: CPT

## 2023-02-07 PROCEDURE — 83880 ASSAY OF NATRIURETIC PEPTIDE: CPT | Performed by: EMERGENCY MEDICINE

## 2023-02-07 PROCEDURE — 87040 BLOOD CULTURE FOR BACTERIA: CPT | Performed by: EMERGENCY MEDICINE

## 2023-02-07 PROCEDURE — 99284 EMERGENCY DEPT VISIT MOD MDM: CPT

## 2023-02-07 PROCEDURE — 96372 THER/PROPH/DIAG INJ SC/IM: CPT

## 2023-02-07 PROCEDURE — 25010000002 ENOXAPARIN PER 10 MG: Performed by: NURSE PRACTITIONER

## 2023-02-07 PROCEDURE — 36415 COLL VENOUS BLD VENIPUNCTURE: CPT

## 2023-02-07 PROCEDURE — 81001 URINALYSIS AUTO W/SCOPE: CPT | Performed by: NURSE PRACTITIONER

## 2023-02-07 PROCEDURE — C9803 HOPD COVID-19 SPEC COLLECT: HCPCS

## 2023-02-07 PROCEDURE — 84484 ASSAY OF TROPONIN QUANT: CPT | Performed by: EMERGENCY MEDICINE

## 2023-02-07 PROCEDURE — 93005 ELECTROCARDIOGRAM TRACING: CPT | Performed by: EMERGENCY MEDICINE

## 2023-02-07 PROCEDURE — 99285 EMERGENCY DEPT VISIT HI MDM: CPT

## 2023-02-07 PROCEDURE — P9612 CATHETERIZE FOR URINE SPEC: HCPCS

## 2023-02-07 PROCEDURE — 80053 COMPREHEN METABOLIC PANEL: CPT | Performed by: EMERGENCY MEDICINE

## 2023-02-07 PROCEDURE — 99222 1ST HOSP IP/OBS MODERATE 55: CPT | Performed by: NURSE PRACTITIONER

## 2023-02-07 PROCEDURE — 25010000002 FUROSEMIDE PER 20 MG: Performed by: EMERGENCY MEDICINE

## 2023-02-07 PROCEDURE — 85025 COMPLETE CBC W/AUTO DIFF WBC: CPT | Performed by: EMERGENCY MEDICINE

## 2023-02-07 PROCEDURE — 96374 THER/PROPH/DIAG INJ IV PUSH: CPT

## 2023-02-07 PROCEDURE — 87636 SARSCOV2 & INF A&B AMP PRB: CPT | Performed by: EMERGENCY MEDICINE

## 2023-02-07 RX ORDER — SODIUM CHLORIDE 0.9 % (FLUSH) 0.9 %
10 SYRINGE (ML) INJECTION AS NEEDED
Status: DISCONTINUED | OUTPATIENT
Start: 2023-02-07 | End: 2023-02-09 | Stop reason: HOSPADM

## 2023-02-07 RX ORDER — BISACODYL 10 MG
10 SUPPOSITORY, RECTAL RECTAL DAILY PRN
Status: DISCONTINUED | OUTPATIENT
Start: 2023-02-07 | End: 2023-02-09 | Stop reason: HOSPADM

## 2023-02-07 RX ORDER — BUMETANIDE 1 MG/1
1 TABLET ORAL DAILY
COMMUNITY

## 2023-02-07 RX ORDER — LEVOTHYROXINE SODIUM 88 UG/1
88 TABLET ORAL DAILY
Status: DISCONTINUED | OUTPATIENT
Start: 2023-02-07 | End: 2023-02-09 | Stop reason: HOSPADM

## 2023-02-07 RX ORDER — AMOXICILLIN 250 MG
2 CAPSULE ORAL 2 TIMES DAILY
Status: DISCONTINUED | OUTPATIENT
Start: 2023-02-07 | End: 2023-02-09 | Stop reason: HOSPADM

## 2023-02-07 RX ORDER — ACETAMINOPHEN 325 MG/1
650 TABLET ORAL EVERY 4 HOURS PRN
Status: DISCONTINUED | OUTPATIENT
Start: 2023-02-07 | End: 2023-02-09 | Stop reason: HOSPADM

## 2023-02-07 RX ORDER — METOPROLOL SUCCINATE 100 MG/1
100 TABLET, EXTENDED RELEASE ORAL DAILY
Status: DISCONTINUED | OUTPATIENT
Start: 2023-02-07 | End: 2023-02-09 | Stop reason: HOSPADM

## 2023-02-07 RX ORDER — PANTOPRAZOLE SODIUM 40 MG/1
40 TABLET, DELAYED RELEASE ORAL
Status: DISCONTINUED | OUTPATIENT
Start: 2023-02-08 | End: 2023-02-09 | Stop reason: HOSPADM

## 2023-02-07 RX ORDER — ENOXAPARIN SODIUM 100 MG/ML
30 INJECTION SUBCUTANEOUS EVERY 24 HOURS
Status: DISCONTINUED | OUTPATIENT
Start: 2023-02-07 | End: 2023-02-09 | Stop reason: HOSPADM

## 2023-02-07 RX ORDER — ONDANSETRON 4 MG/1
4 TABLET, FILM COATED ORAL EVERY 6 HOURS PRN
Status: DISCONTINUED | OUTPATIENT
Start: 2023-02-07 | End: 2023-02-09 | Stop reason: HOSPADM

## 2023-02-07 RX ORDER — SODIUM CHLORIDE 0.9 % (FLUSH) 0.9 %
10 SYRINGE (ML) INJECTION EVERY 12 HOURS SCHEDULED
Status: DISCONTINUED | OUTPATIENT
Start: 2023-02-07 | End: 2023-02-09 | Stop reason: HOSPADM

## 2023-02-07 RX ORDER — FUROSEMIDE 10 MG/ML
40 INJECTION INTRAMUSCULAR; INTRAVENOUS ONCE
Status: COMPLETED | OUTPATIENT
Start: 2023-02-07 | End: 2023-02-07

## 2023-02-07 RX ORDER — POTASSIUM CHLORIDE 750 MG/1
10 TABLET, FILM COATED, EXTENDED RELEASE ORAL DAILY
COMMUNITY

## 2023-02-07 RX ORDER — BISACODYL 5 MG/1
5 TABLET, DELAYED RELEASE ORAL DAILY PRN
Status: DISCONTINUED | OUTPATIENT
Start: 2023-02-07 | End: 2023-02-09 | Stop reason: HOSPADM

## 2023-02-07 RX ORDER — MIRTAZAPINE 7.5 MG/1
7.5 TABLET, FILM COATED ORAL NIGHTLY
COMMUNITY
End: 2023-02-09 | Stop reason: HOSPADM

## 2023-02-07 RX ORDER — ONDANSETRON 2 MG/ML
4 INJECTION INTRAMUSCULAR; INTRAVENOUS EVERY 6 HOURS PRN
Status: DISCONTINUED | OUTPATIENT
Start: 2023-02-07 | End: 2023-02-09 | Stop reason: HOSPADM

## 2023-02-07 RX ORDER — POLYETHYLENE GLYCOL 3350 17 G/17G
17 POWDER, FOR SOLUTION ORAL DAILY PRN
Status: DISCONTINUED | OUTPATIENT
Start: 2023-02-07 | End: 2023-02-09 | Stop reason: HOSPADM

## 2023-02-07 RX ADMIN — ENOXAPARIN SODIUM 30 MG: 30 INJECTION SUBCUTANEOUS at 18:19

## 2023-02-07 RX ADMIN — SENNOSIDES AND DOCUSATE SODIUM 2 TABLET: 50; 8.6 TABLET ORAL at 20:34

## 2023-02-07 RX ADMIN — Medication 10 ML: at 20:34

## 2023-02-07 RX ADMIN — METOPROLOL SUCCINATE 100 MG: 100 TABLET, EXTENDED RELEASE ORAL at 17:56

## 2023-02-07 RX ADMIN — LEVOTHYROXINE SODIUM 88 MCG: 88 TABLET ORAL at 17:56

## 2023-02-07 RX ADMIN — FUROSEMIDE 40 MG: 10 INJECTION, SOLUTION INTRAMUSCULAR; INTRAVENOUS at 10:44

## 2023-02-07 NOTE — ED PROVIDER NOTES
"Subjective  History of Present Illness:    Chief Complaint: Shortness of breath  History of Present Illness: 89-year-old female nursing home patient, no supplemental home oxygen, here with complaints of shortness of breath, began overnight, worse with lying flat.  History of hypertension, DNR, diuretic use, no fever no cough no hemoptysis no palpitation no chest pain  Nurses Notes reviewed and agree, including vitals, allergies, social history and prior medical history.   Also per patient's son, they are concerned that after starting her mirtazapine that she has been sleeping more throughout the day, even falling asleep mid conversation  REVIEW OF SYSTEMS: All systems reviewed and not pertinent unless noted.  Review of Systems   Constitutional: Positive for appetite change.         Positive for: Shortness of breath    Negative for: Fever cough hemoptysis palpitations calf pain chest pain    Past Medical History:   Diagnosis Date   • Constipation    • Disease of thyroid gland    • Hypertension        Allergies:    Patient has no known allergies.      Past Surgical History:   Procedure Laterality Date   • CHOLECYSTECTOMY     • THYROID SURGERY           Social History     Socioeconomic History   • Marital status:    Tobacco Use   • Smoking status: Never   • Smokeless tobacco: Never   Vaping Use   • Vaping Use: Never used   Substance and Sexual Activity   • Alcohol use: No   • Drug use: Never   • Sexual activity: Defer         Family History   Problem Relation Age of Onset   • Arthritis Mother    • Stroke Mother    • Heart attack Other    • Cancer Other        Objective  Physical Exam:  /98   Pulse 104   Temp 97.6 °F (36.4 °C) (Oral)   Resp 18   Ht 157.5 cm (62\")   Wt 65.8 kg (145 lb)   SpO2 98%   BMI 26.52 kg/m²      Physical Exam    CONSTITUTIONAL: Well developed, frail elderly 89-year-old  female,  in no acute distress.  VITAL SIGNS: per nursing, reviewed and noted  SKIN: exposed skin " with no rashes, ulcerations or petechiae  EYES: Grossly EOMI, no icterus  ENT: Normal voice.  Patient maintained wearing a mask throughout patient encounter due to coronavirus pandemic  RESPIRATORY:  No increased work of breathing. No retractions.  Decreased breath sounds with bibasilar crackles  CARDIOVASCULAR:  regular rate and rhythm, no murmurs.  Good Peripheral pulses. Good cap refill to extremities.   GI: Abdomen soft, nontender, normal bowel sounds. No hernia. No ascites.  MUSCULOSKELETAL: Age-appropriate bulk and tone, moves all 4 extremities.  Moderate kyphosis  NEUROLOGIC: Alert, oriented x 3. No gross deficits. GCS 15.   PSYCH: appropriate affect.  : no bladder tenderness or distention, no CVA tenderness    Procedures  No attending physician procedures were performed on this patient.  ED Course:        ED Course as of 02/07/23 1221   Tue Feb 07, 2023   1030 EKG interpreted by me reveals atrial fibrillation with a rate of 117.  No ischemic changes. [PF]      ED Course User Index  [PF] Kaden Snowden,        Lab Results (last 24 hours)     Procedure Component Value Units Date/Time    COVID-19 and FLU A/B PCR - Swab, Nasopharynx [[id number]]  (Normal) Collected: 02/07/23 1025    Specimen: Swab from Nasopharynx Updated: 02/07/23 1130     COVID19 Not Detected     Influenza A PCR Not Detected     Influenza B PCR Not Detected    Narrative:      Fact sheet for providers: https://www.fda.gov/media/346463/download    Fact sheet for patients: https://www.fda.gov/media/984739/download    Test performed by PCR.    CBC & Differential [[id number]]  (Abnormal) Collected: 02/07/23 1056    Specimen: Blood Updated: 02/07/23 1121    Narrative:      The following orders were created for panel order CBC & Differential.  Procedure                               Abnormality         Status                     ---------                               -----------         ------                     CBC Auto Differential[[id number]]         Abnormal            Final result                 Please view results for these tests on the individual orders.    Comprehensive Metabolic Panel [[id number]]  (Abnormal) Collected: 02/07/23 1056    Specimen: Blood Updated: 02/07/23 1143     Glucose 116 mg/dL      BUN 37 mg/dL      Creatinine 1.06 mg/dL      Sodium 142 mmol/L      Potassium 4.4 mmol/L      Chloride 96 mmol/L      CO2 36.6 mmol/L      Calcium 9.5 mg/dL      Total Protein 6.7 g/dL      Albumin 3.9 g/dL      ALT (SGPT) 91 U/L      AST (SGOT) 69 U/L      Alkaline Phosphatase 164 U/L      Total Bilirubin 1.7 mg/dL      Globulin 2.8 gm/dL      A/G Ratio 1.4 g/dL      BUN/Creatinine Ratio 34.9     Anion Gap 9.4 mmol/L      eGFR 48.2 mL/min/1.73     Narrative:      GFR Normal >60  Chronic Kidney Disease <60  Kidney Failure <15    The GFR formula is only valid for adults with stable renal function between ages 18 and 70.    BNP [[id number]]  (Abnormal) Collected: 02/07/23 1056    Specimen: Blood Updated: 02/07/23 1149     proBNP 4,191.0 pg/mL     Narrative:      Among patients with dyspnea, NT-proBNP is highly sensitive for the detection of acute congestive heart failure. In addition NT-proBNP of <300 pg/ml effectively rules out acute congestive heart failure with 99% negative predictive value.    Results may be falsely decreased if patient taking Biotin.      Troponin [[id number]]  (Abnormal) Collected: 02/07/23 1056    Specimen: Blood Updated: 02/07/23 1148     HS Troponin T 26 ng/L     Narrative:      High Sensitive Troponin T Reference Range:  <10.0 ng/L- Negative Female for AMI  <15.0 ng/L- Negative Male for AMI  >=10 - Abnormal Female indicating possible myocardial injury.  >=15 - Abnormal Male indicating possible myocardial injury.   Clinicians would have to utilize clinical acumen, EKG, Troponin, and serial changes to determine if it is an Acute Myocaridal Infarction or myocardial injury due to an underlying chronic condition.         CBC Auto  Differential [[id number]]  (Abnormal) Collected: 02/07/23 1056    Specimen: Blood Updated: 02/07/23 1121     WBC 10.24 10*3/mm3      RBC 5.19 10*6/mm3      Hemoglobin 15.1 g/dL      Hematocrit 46.8 %      MCV 90.2 fL      MCH 29.1 pg      MCHC 32.3 g/dL      RDW 15.9 %      RDW-SD 51.3 fl      MPV 10.6 fL      Platelets 195 10*3/mm3      Neutrophil % 82.0 %      Lymphocyte % 10.2 %      Monocyte % 6.6 %      Eosinophil % 0.1 %      Basophil % 0.1 %      Immature Grans % 1.0 %      Neutrophils, Absolute 8.40 10*3/mm3      Lymphocytes, Absolute 1.04 10*3/mm3      Monocytes, Absolute 0.68 10*3/mm3      Eosinophils, Absolute 0.01 10*3/mm3      Basophils, Absolute 0.01 10*3/mm3      Immature Grans, Absolute 0.10 10*3/mm3      nRBC 0.0 /100 WBC            XR Chest 1 View    Result Date: 2/7/2023  XR CHEST 1 VIEW-  HISTORY: Congestive heart failure/chronic obstructive pulmonary disease protocol , shortness of breath.  COMPARISON:  01/07/2023  FINDINGS:  Portable view of the chest demonstrates bibasilar opacities compatible with atelectasis and vascular congestion. There is no evidence of effusion, pneumothorax or other significant pleural disease. Large presumed diaphragmatic hernia is noted overlying the heart.  The heart size is normal.      Impression: 1. Findings suggestive of mild CHF. 2. Moderate atelectasis. 3. Presumed large diaphragmatic hernia accounting for much of the density within the lung bases.  This report was signed and finalized on 2/7/2023 11:40 AM by Guy Davis MD.         MDM    Initial impression of presenting illness: 89-year-old female presents with shortness of breath    DDX: includes but is not limited to: CHF, COPD, pneumonia, viral syndrome,    Patient arrives slightly hypertensive, tachycardic, with irregular rhythm, afebrile oxygen saturations in initially 94% room air however decreased to 86% at rest, placed on 2 L nasal cannula with improvement oxygen saturations to 98% with vitals  interpreted by myself.     Pertinent features from physical exam: Slight tachycardia, decreased breath sounds with bibasilar crackles, moderate kyphosis.    Initial diagnostic plan: Shortness of breath protocol work-up    Results from initial plan were reviewed and interpreted by me revealing chest x-ray with mild CHF atelectasis and large diaphragmatic hernia BNP 4191, high sensitive troponin 26 BUN 37 creatinine 1.06    Diagnostic information from other sources: MAR review from nursing home, old record review revealing LVEF 55 to 60%, grade 3 diastolic dysfunction, mild concentric LVH with hypertrophy and moderate tricuspid regurg on echo obtained 7/25/2022    Interventions / Re-evaluation: Lasix, supplemental oxygen,    Results/clinical rationale were discussed with patient    Consultations/Discussion of results with other physicians: Dr. Kerley hospitalist    Disposition plan: We will plan to admit given oxygen requirement,  CHF with acute exacerbation, with possibly restrictive component secondary to large diaphragmatic hernia.  -----    Final diagnoses:   Acute on chronic congestive heart failure, unspecified heart failure type (HCC)   Chronic atrial fibrillation (HCC)        Kaden Snowden,   02/07/23 1221

## 2023-02-07 NOTE — H&P
[facility] HOSPITALIST   HISTORY AND PHYSICAL      Name:  Noa Sutton   Age:  89 y.o.  Sex:  female  :  10/7/1926  MRN:  [id number]   Visit Number:  [id number]  Admission Date:  2023  Date Of Service:  23  Primary Care Physician:  Nori Wells APRN    Chief Complaint:     Shortness of breath    History Of Presenting Illness:      Patient is a pleasant 89 year old female who presents to the hospital today with complaints of shortness of breath which started overnight, worse with laying flat.  Patient is a resident at [facility].  Patient with past health history significant for Chronic Diastolic Heart Failure, Chronic Atrial Fibrillation not on anticoagulation, Stage 3a CKD, hypertension, hypothyroidism, hyponatremia, and dementia.  Patient's family is at bedside on arrival to the hospital.  On exam, patient is somnolent, laying in bed.  She mumbles when spoken to, mostly incomprehensible.  Son (Isaias) states that she does still walk with a walker, and he has noticed increased sleeping during the day since starting Remeron and increased confusion over the past week.  States her appetite has not been good. Was notified by facility that patient was sent to the hospital this morning for increased shortness of breath.  Oxygen at 98% on 2L on exam, removed at time of exam.    Work up in the emergency department noted Troponin T-26,25, BNP-4191 with baseline around 2000, CO2-36, chloride-96, creatinine-1.06, BUN-37, Alk Phos-164, ALT-91, AST-69 and total bilirubin-1.7.  WBC-10, covid and influenza testing negative, and CXR noted findings suggestive of mild CHF, moderate atelectasis, and presumed large diaphragmatic hernia accounting for much of the density within the lung bases.  Patient required supplemental oxygen on arrival.  Blood cultures were obtained.  Patient was given Lasix 40mg IV.  With new oxygen requirement, hospitalist was consulted for  admission.    Review Of Systems:    All systems were reviewed and negative except as mentioned in history of presenting illness, assessment and plan.    Past Medical History: Patient  has a past medical history of Constipation, Disease of thyroid gland, and Hypertension.    Past Surgical History: Patient  has a past surgical history that includes Cholecystectomy and Thyroid surgery.    Social History: Patient  reports that she has never smoked. She has never used smokeless tobacco. She reports that she does not drink alcohol and does not use drugs.    Family History:  Patient's family history has been reviewed and found to be noncontributory.     Allergies:      Patient has no known allergies.    Home Medications:    Prior to Admission Medications     Prescriptions Last Dose Informant Patient Reported? Taking?    alendronate (FOSAMAX) 70 MG tablet   No No    TAKE 1 TABLET EVERY 7 DAYS    Calcium Carbonate-Vitamin D (CALTRATE 600+D PO)   Yes No    Take 1 tablet by mouth Daily.    Cholecalciferol (VITAMIN D3 PO)   Yes No    Take 50 mcg by mouth Daily.    levothyroxine (SYNTHROID, LEVOTHROID) 88 MCG tablet   No No    Take 1 tablet by mouth Daily.    lisinopril (PRINIVIL,ZESTRIL) 10 MG tablet   No No    Take 1 tablet by mouth Daily for 30 days. Hold at discharge    metoprolol succinate XL (TOPROL-XL) 100 MG 24 hr tablet   No No    TAKE ONE TABLET BY MOUTH EVERY DAY    omeprazole (priLOSEC) 20 MG capsule   No No    TAKE ONE CAPSULE BY MOUTH EVERY DAY    Polyethyl Glycol-Propyl Glycol (SYSTANE OP)   Yes No    Apply  to eye(s) as directed by provider.    torsemide 60 MG tablet   No No    Take 60 mg by mouth Daily for 14 days.        ED Medications:    Medications   sodium chloride 0.9 % flush 10 mL (has no administration in time range)   furosemide (LASIX) injection 40 mg (40 mg Intravenous Given 2/7/23 1044)     Vital Signs:  Temp:  [97.6 °F (36.4 °C)] 97.6 °F (36.4 °C)  Heart Rate:  [104-112] 104  Resp:  [18] 18  BP:  "(135-148)/(94-98) 148/98        02/07/23  0959   Weight: 65.8 kg (145 lb)     Body mass index is 26.52 kg/m².    Physical Exam:     Most recent vital Signs: /98   Pulse 104   Temp 97.6 °F (36.4 °C) (Oral)   Resp 18   Ht 157.5 cm (62\")   Wt 65.8 kg (145 lb)   SpO2 98%   BMI 26.52 kg/m²     Physical Exam  Vitals and nursing note reviewed.   Constitutional:       General: She is not in acute distress.     Appearance: She is not toxic-appearing.      Comments: Sleeping, appears chronically ill   HENT:      Head: Normocephalic and atraumatic.      Mouth/Throat:      Mouth: Mucous membranes are dry.   Cardiovascular:      Rate and Rhythm: Tachycardia present. Rhythm irregular.      Pulses: Normal pulses.      Heart sounds: Normal heart sounds.   Pulmonary:      Effort: Pulmonary effort is normal.      Comments: Crackles to right base  Abdominal:      General: Bowel sounds are normal.      Palpations: Abdomen is soft.   Musculoskeletal:      Cervical back: Normal range of motion and neck supple.      Right lower leg: Edema present.      Left lower leg: Edema present.      Comments: Redness to RLE   Skin:     General: Skin is warm and dry.      Capillary Refill: Capillary refill takes less than 2 seconds.   Neurological:      General: No focal deficit present.      Motor: Weakness (generalized) present.   Psychiatric:         Mood and Affect: Mood normal.         Behavior: Behavior normal.         Thought Content: Thought content normal.         Laboratory data:    I have reviewed the labs done in the emergency room.    Results from last 7 days   Lab Units 02/07/23  1056   SODIUM mmol/L 142   POTASSIUM mmol/L 4.4   CHLORIDE mmol/L 96*   CO2 mmol/L 36.6*   BUN mg/dL 37*   CREATININE mg/dL 1.06*   CALCIUM mg/dL 9.5   BILIRUBIN mg/dL 1.7*   ALK PHOS U/L 164*   ALT (SGPT) U/L 91*   AST (SGOT) U/L 69*   GLUCOSE mg/dL 116*     Results from last 7 days   Lab Units 02/07/23  1056   WBC 10*3/mm3 10.24   HEMOGLOBIN g/dL " 15.1   HEMATOCRIT % 46.8*   PLATELETS 10*3/mm3 195         Results from last 7 days   Lab Units 02/07/23  1056   HSTROP T ng/L 26*     Results from last 7 days   Lab Units 02/07/23  1056   PROBNP pg/mL 4,191.0*                       Invalid input(s): USDES,  BLOODU, NITRITITE, BACT, EP    Pain Management Panel    There is no flowsheet data to display.         EKG:      Atrial Fibrillation with rate of 117bpm with no acute ST/T wave changes--Abnormal EKG    Radiology:    XR Chest 1 View    Result Date: 2/7/2023  XR CHEST 1 VIEW-  HISTORY: Congestive heart failure/chronic obstructive pulmonary disease protocol , shortness of breath.  COMPARISON:  01/07/2023  FINDINGS:  Portable view of the chest demonstrates bibasilar opacities compatible with atelectasis and vascular congestion. There is no evidence of effusion, pneumothorax or other significant pleural disease. Large presumed diaphragmatic hernia is noted overlying the heart.  The heart size is normal.      1. Findings suggestive of mild CHF. 2. Moderate atelectasis. 3. Presumed large diaphragmatic hernia accounting for much of the density within the lung bases.  This report was signed and finalized on 2/7/2023 11:40 AM by Guy Davis MD.      Assessment:    1. Acute on chronic diastolic heart failure  2. Chronic atrial fibrillation not on anticoagulation  3. Hypertension  4. Hypothyroidism  5. Chronic Dementia  6. CKD stage 3a  7. Impaired ADLs    Plan:    Admit patient to Med/Surg with telemetry monitoring    Acute on Chronic Diastolic Heart Failure  -Given Lasix 40mg IV  -Takes Bumex 1mg daily  -Noted to need 2L supplemental oxygen in ED--baseline room air  -Wean oxygen as able  -Will reassess tomorrow the need for any further diuretics  -Sodium/Fluid restricted cardiac diet  -PT/OT evaluations  -Will obtain Echo as last one done is from 7/2022  -Strict I&Os    Chronic Atrial Fibrillation  -Not anticoagluated  -Continue Metoprolol for rate control  -Does not  appear patient has had medications today--rate 80-110s on exam    Chronic/ Debility/ Increased confusion  -Home medications as reconciled  -PT/OT evaluations  -Case management consulted for d/c planning  -Patient recently placed on Remeron in hopes to stimulate appetite, however, family notes she has had increased sleeping during the day since beginning and is still not eating well  -Will consult nutrition  -Will hold Remeron on admission with increased sleeping  -Will check UA   -Could likely be gradual decline with underlying dementia as well.    Patient is a DNR/DNI on admission confirmed with son, Isaias.    Risk Assessment: Moderate given co-morbidities  DVT Prophylaxis: Lovenox  Code Status: DNR/DNI  Diet: Cardiac    Advance Care Planning   ACP discussion was held with the patient during this visit. Patient has an advance directive (not in EMR), copy requested.       Soraya Reyes, APRN  02/07/23  13:51 EST    Dictated utilizing Dragon dictation.

## 2023-02-07 NOTE — PLAN OF CARE
Goal Outcome Evaluation:         New admit from ED this shift. Pt. Somnolent at beginning of shift and now more alert and talkative. Pt. Pleasantly confused. VSS on RA. Urine sample sent. Pt. Incontinent. Echo pending. Pt. Voices no concerns at this time.

## 2023-02-08 ENCOUNTER — APPOINTMENT (OUTPATIENT)
Dept: CARDIOLOGY | Facility: HOSPITAL | Age: 88
End: 2023-02-08
Payer: MEDICARE

## 2023-02-08 LAB
ANION GAP SERPL CALCULATED.3IONS-SCNC: 9.6 MMOL/L (ref 5–15)
BH CV ECHO MEAS - AI P1/2T: 719.4 MSEC
BH CV ECHO MEAS - AO MAX PG: 6.7 MMHG
BH CV ECHO MEAS - AO MEAN PG: 3 MMHG
BH CV ECHO MEAS - AO ROOT DIAM: 2.7 CM
BH CV ECHO MEAS - AO V2 MAX: 129 CM/SEC
BH CV ECHO MEAS - AO V2 VTI: 21.7 CM
BH CV ECHO MEAS - AVA(I,D): 1.24 CM2
BH CV ECHO MEAS - EDV(CUBED): 23.4 ML
BH CV ECHO MEAS - EDV(MOD-SP2): 29.1 ML
BH CV ECHO MEAS - EDV(MOD-SP4): 17 ML
BH CV ECHO MEAS - EF(MOD-BP): 52.5 %
BH CV ECHO MEAS - EF(MOD-SP2): 56.4 %
BH CV ECHO MEAS - EF(MOD-SP4): 48 %
BH CV ECHO MEAS - ESV(CUBED): 8.5 ML
BH CV ECHO MEAS - ESV(MOD-SP2): 12.7 ML
BH CV ECHO MEAS - ESV(MOD-SP4): 8.8 ML
BH CV ECHO MEAS - FS: 28.7 %
BH CV ECHO MEAS - IVS/LVPW: 1.31 CM
BH CV ECHO MEAS - IVSD: 1.47 CM
BH CV ECHO MEAS - LA DIMENSION: 4.4 CM
BH CV ECHO MEAS - LAT PEAK E' VEL: 9.1 CM/SEC
BH CV ECHO MEAS - LV MASS(C)D: 115.8 GRAMS
BH CV ECHO MEAS - LV MAX PG: 1.33 MMHG
BH CV ECHO MEAS - LV MEAN PG: 1 MMHG
BH CV ECHO MEAS - LV V1 MAX: 57.7 CM/SEC
BH CV ECHO MEAS - LV V1 VTI: 10.6 CM
BH CV ECHO MEAS - LVIDD: 2.9 CM
BH CV ECHO MEAS - LVIDS: 2.04 CM
BH CV ECHO MEAS - LVOT AREA: 2.5 CM2
BH CV ECHO MEAS - LVOT DIAM: 1.8 CM
BH CV ECHO MEAS - LVPWD: 1.12 CM
BH CV ECHO MEAS - MED PEAK E' VEL: 7.3 CM/SEC
BH CV ECHO MEAS - MV DEC TIME: 0.18 MSEC
BH CV ECHO MEAS - MV E MAX VEL: 118 CM/SEC
BH CV ECHO MEAS - MV MAX PG: 6.4 MMHG
BH CV ECHO MEAS - MV MEAN PG: 2 MMHG
BH CV ECHO MEAS - MV V2 VTI: 20.6 CM
BH CV ECHO MEAS - MVA(VTI): 1.31 CM2
BH CV ECHO MEAS - PA ACC TIME: 0.07 SEC
BH CV ECHO MEAS - PA PR(ACCEL): 47.5 MMHG
BH CV ECHO MEAS - PA V2 MAX: 57.4 CM/SEC
BH CV ECHO MEAS - RV MAX PG: 0.98 MMHG
BH CV ECHO MEAS - RV V1 MAX: 49.5 CM/SEC
BH CV ECHO MEAS - RV V1 VTI: 8.1 CM
BH CV ECHO MEAS - SV(LVOT): 27 ML
BH CV ECHO MEAS - SV(MOD-SP2): 16.4 ML
BH CV ECHO MEAS - SV(MOD-SP4): 8.2 ML
BH CV ECHO MEAS - TAPSE (>1.6): 1.54 CM
BH CV ECHO MEAS - TR MAX PG: 28.3 MMHG
BH CV ECHO MEAS - TR MAX VEL: 265.7 CM/SEC
BH CV ECHO MEASUREMENTS AVERAGE E/E' RATIO: 14.39
BH CV XLRA - RV BASE: 4.7 CM
BH CV XLRA - RV LENGTH: 6.2 CM
BH CV XLRA - TDI S': 10.1 CM/SEC
BUN SERPL-MCNC: 38 MG/DL (ref 8–23)
BUN/CREAT SERPL: 36.9 (ref 7–25)
CALCIUM SPEC-SCNC: 9.1 MG/DL (ref 8.2–9.6)
CHLORIDE SERPL-SCNC: 100 MMOL/L (ref 98–107)
CO2 SERPL-SCNC: 35.4 MMOL/L (ref 22–29)
CREAT SERPL-MCNC: 1.03 MG/DL (ref 0.57–1)
DEPRECATED RDW RBC AUTO: 51.6 FL (ref 37–54)
EGFRCR SERPLBLD CKD-EPI 2021: 49.9 ML/MIN/1.73
ERYTHROCYTE [DISTWIDTH] IN BLOOD BY AUTOMATED COUNT: 15.9 % (ref 12.3–15.4)
GLUCOSE SERPL-MCNC: 93 MG/DL (ref 65–99)
HCT VFR BLD AUTO: 43.7 % (ref 34–46.6)
HGB BLD-MCNC: 13.9 G/DL (ref 12–15.9)
LEFT ATRIUM VOLUME INDEX: 42.3 ML/M2
LYMPHOCYTES # BLD MANUAL: 0.95 10*3/MM3 (ref 0.7–3.1)
LYMPHOCYTES NFR BLD MANUAL: 7 % (ref 5–12)
MAXIMAL PREDICTED HEART RATE: 124 BPM
MCH RBC QN AUTO: 28.5 PG (ref 26.6–33)
MCHC RBC AUTO-ENTMCNC: 31.8 G/DL (ref 31.5–35.7)
MCV RBC AUTO: 89.7 FL (ref 79–97)
MONOCYTES # BLD: 0.55 10*3/MM3 (ref 0.1–0.9)
NEUTROPHILS # BLD AUTO: 6.42 10*3/MM3 (ref 1.7–7)
NEUTROPHILS NFR BLD MANUAL: 78 % (ref 42.7–76)
NEUTS BAND NFR BLD MANUAL: 3 % (ref 0–5)
PLAT MORPH BLD: NORMAL
PLATELET # BLD AUTO: 191 10*3/MM3 (ref 140–450)
PMV BLD AUTO: 10.9 FL (ref 6–12)
POTASSIUM SERPL-SCNC: 4.4 MMOL/L (ref 3.5–5.2)
RBC # BLD AUTO: 4.87 10*6/MM3 (ref 3.77–5.28)
RBC MORPH BLD: NORMAL
SODIUM SERPL-SCNC: 145 MMOL/L (ref 136–145)
STRESS TARGET HR: 105 BPM
VARIANT LYMPHS NFR BLD MANUAL: 12 % (ref 19.6–45.3)
WBC MORPH BLD: NORMAL
WBC NRBC COR # BLD: 7.92 10*3/MM3 (ref 3.4–10.8)

## 2023-02-08 PROCEDURE — G0378 HOSPITAL OBSERVATION PER HR: HCPCS

## 2023-02-08 PROCEDURE — 93306 TTE W/DOPPLER COMPLETE: CPT | Performed by: INTERNAL MEDICINE

## 2023-02-08 PROCEDURE — 96372 THER/PROPH/DIAG INJ SC/IM: CPT

## 2023-02-08 PROCEDURE — 97165 OT EVAL LOW COMPLEX 30 MIN: CPT

## 2023-02-08 PROCEDURE — 25010000002 ENOXAPARIN PER 10 MG: Performed by: NURSE PRACTITIONER

## 2023-02-08 PROCEDURE — 99232 SBSQ HOSP IP/OBS MODERATE 35: CPT | Performed by: NURSE PRACTITIONER

## 2023-02-08 PROCEDURE — 96376 TX/PRO/DX INJ SAME DRUG ADON: CPT

## 2023-02-08 PROCEDURE — 80048 BASIC METABOLIC PNL TOTAL CA: CPT | Performed by: NURSE PRACTITIONER

## 2023-02-08 PROCEDURE — 85007 BL SMEAR W/DIFF WBC COUNT: CPT | Performed by: NURSE PRACTITIONER

## 2023-02-08 PROCEDURE — 97161 PT EVAL LOW COMPLEX 20 MIN: CPT

## 2023-02-08 PROCEDURE — 25010000002 FUROSEMIDE PER 20 MG: Performed by: NURSE PRACTITIONER

## 2023-02-08 PROCEDURE — 93306 TTE W/DOPPLER COMPLETE: CPT

## 2023-02-08 PROCEDURE — 85027 COMPLETE CBC AUTOMATED: CPT | Performed by: NURSE PRACTITIONER

## 2023-02-08 RX ORDER — ACETAMINOPHEN 500 MG
1000 TABLET ORAL EVERY 6 HOURS PRN
COMMUNITY

## 2023-02-08 RX ORDER — ONDANSETRON 4 MG/1
4 TABLET, FILM COATED ORAL EVERY 6 HOURS PRN
COMMUNITY

## 2023-02-08 RX ORDER — DOCUSATE SODIUM 100 MG/1
100 CAPSULE, LIQUID FILLED ORAL DAILY
COMMUNITY

## 2023-02-08 RX ORDER — FUROSEMIDE 10 MG/ML
40 INJECTION INTRAMUSCULAR; INTRAVENOUS ONCE
Status: COMPLETED | OUTPATIENT
Start: 2023-02-08 | End: 2023-02-08

## 2023-02-08 RX ADMIN — Medication 10 ML: at 09:50

## 2023-02-08 RX ADMIN — SENNOSIDES AND DOCUSATE SODIUM 2 TABLET: 50; 8.6 TABLET ORAL at 09:49

## 2023-02-08 RX ADMIN — FUROSEMIDE 40 MG: 10 INJECTION, SOLUTION INTRAMUSCULAR; INTRAVENOUS at 18:43

## 2023-02-08 RX ADMIN — METOPROLOL SUCCINATE 100 MG: 100 TABLET, EXTENDED RELEASE ORAL at 09:49

## 2023-02-08 RX ADMIN — ENOXAPARIN SODIUM 30 MG: 30 INJECTION SUBCUTANEOUS at 18:43

## 2023-02-08 RX ADMIN — PANTOPRAZOLE SODIUM 40 MG: 40 TABLET, DELAYED RELEASE ORAL at 06:40

## 2023-02-08 RX ADMIN — LEVOTHYROXINE SODIUM 88 MCG: 88 TABLET ORAL at 09:49

## 2023-02-08 NOTE — THERAPY EVALUATION
Patient Name: Noa Sutton  : 10/7/1926    MRN: [id number]                              Today's Date: 2023       Admit Date: 2023    Visit Dx:     ICD-10-CM ICD-9-CM   1. Acute on chronic congestive heart failure, unspecified heart failure type (HCC)  I50.9 428.0   2. 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)   • Salzmann's nodular degeneration   • Hyponatremia   • Atrial fibrillation (HCC)   • Metabolic encephalopathy   • Moderate malnutrition (HCC)   • Acute on chronic congestive heart failure, unspecified heart failure type (HCC)     Past Medical History:   Diagnosis Date   • Constipation    • Disease of thyroid gland    • Hypertension      Past Surgical History:   Procedure Laterality Date   • CHOLECYSTECTOMY     • THYROID SURGERY        General Information     Row Name 23 1248          OT Time and Intention    Document Type evaluation  -     Mode of Treatment occupational therapy  -     Row Name 23 1248          General Information    Patient Profile Reviewed yes  -     Prior Level of Function independent:;all household mobility;min assist:;ADL's  -     Existing Precautions/Restrictions fall  -     Barriers to Rehab previous functional deficit;cognitive status  -     Row Name 23 1248          Occupational Profile    Reason for Services/Referral (Occupational Profile) ADL decline  -     Row Name 23 1248          Living Environment    [facility] in Home facility resident  lives at [facility]  -     Row  Name 02/08/23 1248          Cognition    Orientation Status (Cognition) oriented to;person  -[facility] Name 02/08/23 1248          Safety Issues, Functional Mobility    Impairments Affecting Function (Mobility) balance;endurance/activity tolerance;strength;pain;shortness of breath;cognition  -     Cognitive Impairments, Mobility Safety/Performance insight into deficits/self-awareness;safety precaution awareness;safety precaution follow-through  -           User Key  (r) = Recorded By, (t) = Taken By, (c) = Cosigned By    Initials Name Provider Type    Brandie Chen Occupational Therapist                 Mobility/ADL's     [facility] Name 02/08/23 1250          Bed Mobility    Bed Mobility bed mobility (all) activities  -     All Activities, [place] (Bed Mobility) contact guard  -     Assistive Device (Bed Mobility) bed rails;draw sheet;head of bed elevated  -[facility] Name 02/08/23 1250          Transfers    Transfers sit-stand transfer  -[facility] Name 02/08/23 1250          Bed-Chair Transfer    Bed-Chair [place] (Transfers) contact guard  -     Assistive Device (Bed-Chair Transfers) walker, front-wheeled  -[facility] Name 02/08/23 1250          Sit-Stand Transfer    Sit-Stand [place] (Transfers) contact guard  -     Assistive Device (Sit-Stand Transfers) walker, front-wheeled  -[facility] Name 02/08/23 1250          Functional Mobility    Functional Mobility- Ind. Level contact guard assist  -     Functional Mobility- Device walker, front-wheeled  -     Functional Mobility-Distance (Feet) 3  -[facility] Name 02/08/23 1250          Activities of Daily Living    BADL Assessment/Intervention bathing;upper body dressing;lower body dressing;grooming;feeding;toileting  -[facility] Name 02/08/23 1250          Bathing Assessment/Intervention    [place] Level (Bathing) minimum assist (75% patient effort)  -[facility] Name 02/08/23 1250          Upper Body Dressing Assessment/Training     [place] Level (Upper Body Dressing) set up  -AH     Row Name 02/08/23 1250          Lower Body Dressing Assessment/Training    [place] Level (Lower Body Dressing) minimum assist (75% patient effort)  -AH     Row Name 02/08/23 1250          Grooming Assessment/Training    [place] Level (Grooming) minimum assist (75% patient effort)  -AH     Row Name 02/08/23 1250          Self-Feeding Assessment/Training    [place] Level (Feeding) set up  -AH     Row Name 02/08/23 1250          Toileting Assessment/Training    [place] Level (Toileting) moderate assist (50% patient effort)  -           User Key  (r) = Recorded By, (t) = Taken By, (c) = Cosigned By    Initials Name Provider Type    Brandie Chen Occupational Therapist               Obj/Interventions     Row Name 02/08/23 1256          Sensory Assessment (Somatosensory)    Sensory Assessment (Somatosensory) sensation intact  -AH     Row Name 02/08/23 1256          Vision Assessment/Intervention    Visual Impairment/Limitations WFL  -AH     Row Name 02/08/23 1256          Range of Motion Comprehensive    Comment, General Range of Motion BUE WFL  -AH     Row Name 02/08/23 1256          Strength Comprehensive (MMT)    Comment, General Manual Muscle Testing (MMT) Assessment BUE 4-/5  -           User Key  (r) = Recorded By, (t) = Taken By, (c) = Cosigned By    Initials Name Provider Type    Brandie Chen Occupational Therapist               Goals/Plan     Row Name 02/08/23 1429          Bed Mobility Goal 1 (OT)    Activity/Assistive Device (Bed Mobility Goal 1, OT) bed mobility activities, all  -     [place] Level/Cues Needed (Bed Mobility Goal 1, OT) modified independence  -     Time Frame (Bed Mobility Goal 1, OT) by discharge  -     Progress/Outcomes (Bed Mobility Goal 1, OT) goal ongoing  -AH     Row Name 02/08/23 1429          Transfer Goal 1 (OT)    Activity/Assistive Device (Transfer Goal 1, OT)  sit-to-stand/stand-to-sit;walker, rolling  -     [place] Level/Cues Needed (Transfer Goal 1, OT) standby assist  -     Time Frame (Transfer Goal 1, OT) long term goal (LTG);5 days  -     Progress/Outcome (Transfer Goal 1, OT) goal ongoing  -[facility] Name 02/08/23 1429          Dressing Goal 1 (OT)    Activity/Device (Dressing Goal 1, OT) lower body dressing  -     [place]/Cues Needed (Dressing Goal 1, OT) minimum assist (75% or more patient effort)  -     Time Frame (Dressing Goal 1, OT) by discharge  -     Progress/Outcome (Dressing Goal 1, OT) goal ongoing  -[facility] Name 02/08/23 1429          Toileting Goal 1 (OT)    Activity/Device (Toileting Goal 1, OT) adjust/manage clothing;perform perineal hygiene  -     [place] Level/Cues Needed (Toileting Goal 1, OT) contact guard required  -     Time Frame (Toileting Goal 1, OT) by discharge  -     Progress/Outcome (Toileting Goal 1, OT) goal ongoing  -[facility] Name 02/08/23 1429          Grooming Goal 1 (OT)    Activity/Device (Grooming Goal 1, OT) grooming skills, all  -     [place] (Grooming Goal 1, OT) set-up required  -     Time Frame (Grooming Goal 1, OT) long term goal (LTG);5 days  -     Progress/Outcome (Grooming Goal 1, OT) goal ongoing  -[facility] Name 02/08/23 1429          Strength Goal 1 (OT)    Strength Goal 1 (OT) Pt will perform UB strengthening ex using theraband for resistance.  -     Progress/Outcome (Strength Goal 1, OT) goal ongoing  -[facility] Name 02/08/23 1429          Therapy Assessment/Plan (OT)    Planned Therapy Interventions (OT) activity tolerance training;BADL retraining;patient/caregiver education/training;transfer/mobility retraining;strengthening exercise  -           User Key  (r) = Recorded By, (t) = Taken By, (c) = Cosigned By    Initials Name Provider Type    Brandie Chen Occupational Therapist               Clinical Impression     Row Name 02/08/23 1257          Pain  Assessment    Pretreatment Pain Rating 0/10 - no pain  -     Posttreatment Pain Rating 0/10 - no pain  -     Pain Intervention(s) Repositioned;Ambulation/increased activity  -AH     Row Name 02/08/23 1257          Plan of Care Review    Plan of Care Reviewed With patient  -     Progress no change  -     Outcome Evaluation OT evaluation completed today, pt presents with weakness and decreased independence with self care and functional mobility tasks.  Pt able to sit eob with cga, stood and walk 4' with cga using RW.  Pt is expected to benefit from skilled OT to improve her strength and independence with ADL tasks.  -AH     Row Name 02/08/23 1257          Therapy Assessment/Plan (OT)    Patient/Family Therapy Goal Statement (OT) d/c back to [facility].  -     Rehab Potential (OT) good, to achieve stated therapy goals  -     Criteria for Skilled Therapeutic Interventions Met (OT) yes;skilled treatment is necessary  -     Therapy Frequency (OT) 3 times/wk  -AH     Row Name 02/08/23 1257          Therapy Plan Review/Discharge Plan (OT)    Anticipated Discharge Disposition (OT) assisted living  -AH     Row Name 02/08/23 1257          Vital Signs    Intra SpO2 (%) 88  -     Post SpO2 (%) 94  -AH     Row Name 02/08/23 1257          Positioning and Restraints    Pre-Treatment Position in bed  -     Post Treatment Position chair  -     In Chair reclined;call light within reach;encouraged to call for assist;exit alarm on;with family/caregiver  -           User Key  (r) = Recorded By, (t) = Taken By, (c) = Cosigned By    Initials Name Provider Type     Brandie Lazaro Occupational Therapist               Outcome Measures     Row Name 02/08/23 1430          How much help from another is currently needed...    Putting on and taking off regular lower body clothing? 3  -AH     Bathing (including washing, rinsing, and drying) 3  -AH     Toileting (which includes using toilet bed pan or urinal) 3  -AH     Putting on  and taking off regular upper body clothing 3  -AH     Taking care of personal grooming (such as brushing teeth) 3  -AH     Eating meals 3  -     AM-PAC 6 Clicks Score (OT) 18  -AH     Row Name 02/08/23 1230 02/08/23 0949       How much help from another person do you currently need...    Turning from your back to your side while in flat bed without using bedrails? 3  -MS 2  -RAUL    Moving from lying on back to sitting on the side of a flat bed without bedrails? 3  -MS 2  -RAUL    Moving to and from a bed to a chair (including a wheelchair)? 3  -MS 2  -RAUL    Standing up from a chair using your arms (e.g., wheelchair, bedside chair)? 3  -MS 2  -RAUL    Climbing 3-5 steps with a railing? 2  -MS 2  -RAUL    To walk in hospital room? 3  -MS 2  -RAUL    AM-PAC 6 Clicks Score (PT) 17  -MS 12  -RAUL    Highest level of mobility 5 --> Static standing  -MS 4 --> Transferred to chair/commode  -RAUL    Row Name 02/08/23 1430          Functional Assessment    Outcome Measure Options AM-PAC 6 Clicks Daily Activity (OT)  -           User Key  (r) = Recorded By, (t) = Taken By, (c) = Cosigned By    Initials Name Provider Type     Brandie Lazaro Occupational Therapist    Jorden Guevara, PT Physical Therapist    Jaguar Adams, RN Registered Nurse                Occupational Therapy Education     Title: PT OT SLP Therapies (In Progress)     Topic: Occupational Therapy (In Progress)     Point: ADL training (Done)     Description:   Instruct learner(s) on proper safety adaptation and remediation techniques during self care or transfers.   Instruct in proper use of assistive devices.              Learning Progress Summary           Patient Acceptance, E,TB, VU by  at 2/8/2023 1431    Comment: Role of OT/POC                   Point: Home exercise program (Not Started)     Description:   Instruct learner(s) on appropriate technique for monitoring, assisting and/or progressing therapeutic exercises/activities.              Learner  Progress:  Not documented in this visit.          Point: Precautions (Not Started)     Description:   Instruct learner(s) on prescribed precautions during self-care and functional transfers.              Learner Progress:  Not documented in this visit.          Point: Body mechanics (Not Started)     Description:   Instruct learner(s) on proper positioning and spine alignment during self-care, functional mobility activities and/or exercises.              Learner Progress:  Not documented in this visit.                      User Key     Initials Effective Dates Name Provider Type [facility] 06/16/21 -  Brandie Lazaro Occupational Therapist OT              OT Recommendation and Plan  Planned Therapy Interventions (OT): activity tolerance training, BADL retraining, patient/caregiver education/training, transfer/mobility retraining, strengthening exercise  Therapy Frequency (OT): 3 times/wk  Plan of Care Review  Plan of Care Reviewed With: patient  Progress: no change  Outcome Evaluation: OT evaluation completed today, pt presents with weakness and decreased independence with self care and functional mobility tasks.  Pt able to sit eob with cga, stood and walk 4' with cga using RW.  Pt is expected to benefit from skilled OT to improve her strength and independence with ADL tasks.     Time Calculation:    Time Calculation- OT     Row Name 02/08/23 1432             Time Calculation- OT    OT Start Time 1038  -AH      OT Received On 02/08/23  -      OT Goal Re-Cert Due Date 02/18/23  -         Untimed Charges    OT Eval/Re-eval Minutes 40  -         Total Minutes    Untimed Charges Total Minutes 40  -       Total Minutes 40  -AH            User Key  (r) = Recorded By, (t) = Taken By, (c) = Cosigned By    Initials Name Provider Type     Brandie Lazaro Occupational Therapist              Therapy Charges for Today     Code Description Service Date Service Provider Modifiers Qty    [id number]  OT EVAL LOW  LUCERO 3 2/8/2023 Brandie Lazaor 1               Brandie Lazaro  2/8/2023

## 2023-02-08 NOTE — CASE MANAGEMENT/SOCIAL WORK
Discharge Planning Assessment  [facility]     Patient Name: Noa Sutton  MRN: [id number]  Today's Date: 2/8/2023    Admit Date: 2/7/2023    Plan: Retrun to [facility]   Discharge Needs Assessment     Row Name 02/08/23 1106       Living Environment    People in Home facility resident    Name(s) of People in Home [facility]    Current Living Arrangements extended care facility    Primary Care Provided by other (see comments)    Provides Primary Care For no one, unable/limited ability to care for self    Family Caregiver if Needed child(marie), adult    Quality of Family Relationships involved       Transition Planning    Patient/Family Anticipated Services at Transition        Discharge Needs Assessment    Readmission Within the Last 30 Days no previous admission in last 30 days    Equipment Currently Used at Home walker, rolling    Concerns to be Addressed discharge planning               Discharge Plan     Row Name 02/08/23 1110       Plan    Plan Return to [facility]    Plan Comments Spoke to pt son Isaias Confirmed she has been at [facility] for 4 months prior to that she was at [place] for 2 weeks and prior to that she was home alone .She uses a walker Goal for family is to return home .Called and spoke to their DON Marshall robison She reports that she will need to revauluate to see if [facility] can   meet her needs .She will need to able to feed herself and require just one person to assist with transfer .She reports pt is confused at times but is easily redirected She was ambulating with a walker independently  There is a Health Surrogate scanned into the chart              Continued Care and Services - Admitted Since 2/7/2023    Coordination has not been started for this encounter.       Expected Discharge Date and Time     Expected Discharge Date Expected Discharge Time    Feb 13, 2023          Demographic Summary     Row Name 02/08/23 1109       General Information     Admission Type observation    Referral Source admission list    Reason for Consult discharge planning               Functional Status     Row Name 02/08/23 1105       Functional Status    Usual Activity Tolerance fair       Functional Status, IADL    Medications completely dependent    Meal Preparation completely dependent    Housekeeping completely dependent    Laundry completely dependent    Shopping completely dependent       Mental Status    General Appearance WDL WDL               Psychosocial    No documentation.                Abuse/Neglect    No documentation.                Legal    No documentation.                Substance Abuse    No documentation.                Patient Forms    No documentation.                   Elsy Rollins RN

## 2023-02-08 NOTE — CONSULTS
"Adult Nutrition  Assessment/PES    Patient Name:  Noa Sutton  YOB: 1926  MRN: [id number]  Admit Date:  2/7/2023    Assessment Date:  2/8/2023    Comments:    Pt with PMHx significant for CHF, atrial fibrillation, CKD III, HTN, hypothyroidism, hyponatremia, dementia presented to [facility] yesterday 2/7 with c/o SOB. Normal weight for age per BMI 23.75. MST 3. Pt unsure of weight loss. Per weight report, pt with a significant 20 lb (13.4%) weight loss x 6 months. RD to perform NFPE at a later time to determine malnutrition. Currently receiving a cardiac diet with a 1,500 mL fluid restriction. No PO intakes yet documented. Reports of decreased intake per nutrition screen. RD to order Boost Plus BID to supplement intakes.     1. Continue to encourage PO intakes on current diet as medically indicated and tolerated   2. Provide Boost Plus BID and encourage intake  3. Continue to monitor and replace electrolytes PRN     RD to F/U and available PRN.       Reason for Assessment     Row Name 02/08/23 0933          Reason for Assessment    Reason For Assessment nurse/nurse practitioner consult;identified at risk by screening criteria     Identified At Risk by Screening Criteria MST SCORE 2+                  Labs/Tests/Procedures/Meds     Row Name 02/08/23 0933          Labs/Procedures/Meds    Lab Results Reviewed reviewed, pertinent     Lab Results Comments High: BUN Cr        Medications    Pertinent Medications Reviewed reviewed, pertinent     Pertinent Medications Comments lovenox, synthroid, metoprolol, docusate, protonix                Physical Findings     Row Name 02/08/23 0936          Physical Findings    Overall Physical Appearance normal weight for age per BMI 23.75, Hi score 12                Estimated/Assessed Needs - Anthropometrics     Row Name 02/08/23 0936 02/08/23 0447       Anthropometrics    Weight -- 58.9 kg (129 lb 13.6 oz)    Height for Calculation 1.575 m (5' 2\") --    Weight for " Calculation 59 kg (130 lb 1.1 oz)  current --       Estimated/Assessed Needs    Additional Documentation Fluid Requirements (Group);Estimated Calorie Needs (Group);KCAL/KG (Group);Protein Requirements (Group) --       Estimated Calorie Needs    Estimated Calorie Requirement (kcal/day) 1,475-1,770 --    Estimated Calorie Need Method kcal/kg --       KCAL/KG    KCAL/KG 25 Kcal/Kg (kcal);30 Kcal/Kg (kcal) --    25 Kcal/Kg (kcal) 1475 --    30 Kcal/Kg (kcal) 1770 --       Protein Requirements    Weight Used For Protein Calculations 59 kg (130 lb 1.1 oz)  current --    Est Protein Requirement Amount (gms/kg) 1.0 gm protein --    Estimated Protein Requirements (gms/day) 59 --       Fluid Requirements    Fluid Requirements (mL/day) 1475  1 mL/kcal --    RDA Method (mL) 1475 --               Nutrition Prescription Ordered     Row Name 02/08/23 0938          Nutrition Prescription PO    Current PO Diet Regular     Fluid Consistency Thin     Common Modifiers Cardiac;Fluid Restriction     Fluid Restriction mL per Tray Other (comment)  240     Fluid Restriction mL per Day 1500 mL                Evaluation of Received Nutrient/Fluid Intake     Row Name 02/08/23 0939          PO Evaluation    Number of Days PO Intake Evaluated Insufficient Data                   Problem/Interventions:   Problem 1     Row Name 02/08/23 0939          Nutrition Diagnoses Problem 1    Problem 1 Predicted Suboptimal Intake     Etiology (related to) Medical Diagnosis;Factors Affecting Nutrition     Signs/Symptoms (evidenced by) Report/Observation  reports of reduced intake                Problem 2     Row Name 02/08/23 0941          Nutrition Diagnoses Problem 2    Problem 2 Unintended Weight Loss     Etiology (related to) Medical Diagnosis;Factors Affecting Nutrition     Signs/Symptoms (evidenced by) Unintended Weight Change     Unintended Weight Change Loss     Number of Pounds Lost 20     Weight loss time period 6 months                     Intervention Goal     Row Name 02/08/23 0941          Intervention Goal    General Maintain nutrition;Meet nutritional needs for age/condition;Improved nutrition related lab(s);Disease management/therapy     PO Establish PO;Tolerate PO;Meet estimated needs     Weight Maintain weight                Nutrition Intervention     Row Name 02/08/23 0942          Nutrition Intervention    RD/Tech Action Follow Tx progress;Encourage intake;Care plan reviewd;Recommend/ordered     Recommended/Ordered Supplement                Nutrition Prescription     Row Name 02/08/23 0942          Nutrition Prescription PO    PO Prescription Begin/change supplement     Supplement Boost Plus     Supplement Frequency 2 times a day     New PO Prescription Ordered? Yes        Other Orders    Obtain Weight Daily     Obtain Weight Ordered? No, recommended     Labs K+;Phos;Mg++;Na+     Labs Ordered? No, recommended                Education/Evaluation     Row Name 02/08/23 0944          Education    Education Education not appropriate at this time     Please explain Patient confusion        Monitor/Evaluation    Monitor Per protocol;PO intake;Supplement intake;Pertinent labs;Weight;Skin status;Symptoms                 Electronically signed by:  Keily Coe RD  02/08/23 09:45 EST

## 2023-02-08 NOTE — PLAN OF CARE
Goal Outcome Evaluation:  Plan of Care Reviewed With: patient        Progress: no change  Outcome Evaluation: OT evaluation completed today, pt presents with weakness and decreased independence with self care and functional mobility tasks.  Pt able to sit eob with cga, stood and walk 4' with cga using RW.  Pt is expected to benefit from skilled OT to improve her strength and independence with ADL tasks.

## 2023-02-08 NOTE — PLAN OF CARE
Goal Outcome Evaluation:  Plan of Care Reviewed With: patient        Progress: no change  Outcome Evaluation: Pt participated in PT eval this date. Pt oriented to person. Pt transferred to EOB with CGA. Pt then ambulated 4ft to the chair with CGA and Rwx. Pt reclined with chair alarm in place. Pt is expected to benefit from skilled PTx during this inpatient stay.

## 2023-02-08 NOTE — THERAPY EVALUATION
Patient Name: Noa Sutton  : 10/7/1926    MRN: [id number]                              Today's Date: 2023       Admit Date: 2023    Visit Dx:     ICD-10-CM ICD-9-CM   1. Acute on chronic congestive heart failure, unspecified heart failure type (HCC)  I50.9 428.0   2. 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)   • Salzmann's nodular degeneration   • Hyponatremia   • Atrial fibrillation (HCC)   • Metabolic encephalopathy   • Moderate malnutrition (HCC)   • Acute on chronic congestive heart failure, unspecified heart failure type (HCC)     Past Medical History:   Diagnosis Date   • Constipation    • Disease of thyroid gland    • Hypertension      Past Surgical History:   Procedure Laterality Date   • CHOLECYSTECTOMY     • THYROID SURGERY        General Information     Row Name 23 1215          Physical Therapy Time and Intention    Document Type evaluation  -MS     Mode of Treatment physical therapy  -MS     Row Name 23 1215          General Information    Patient Profile Reviewed yes  -MS     Prior Level of Function independent:;all household mobility  -MS     Existing Precautions/Restrictions fall  -MS     Row Name 23 1215          Living Environment    People in Home facility resident  -MS     Row Name 23 1215          Cognition    Orientation Status (Cognition) oriented to;person  -MS     Row Name 23 1215          Safety Issues, Functional Mobility    Impairments Affecting Function (Mobility) balance;endurance/activity  tolerance;strength;pain;shortness of breath;cognition  -MS           User Key  (r) = Recorded By, (t) = Taken By, (c) = Cosigned By    Initials Name Provider Type    Jorden Guevara PT Physical Therapist               Mobility     Row Name 02/08/23 1216          Bed Mobility    Bed Mobility bed mobility (all) activities  -MS     All Activities, [place] (Bed Mobility) contact guard  -MS     Assistive Device (Bed Mobility) bed rails;draw sheet;head of bed elevated  -MS     Row Name 02/08/23 1216          Bed-Chair Transfer    Bed-Chair [place] (Transfers) contact guard  -MS     Assistive Device (Bed-Chair Transfers) walker, front-wheeled  -MS     Row Name 02/08/23 1216          Sit-Stand Transfer    Sit-Stand [place] (Transfers) contact guard  -MS     Assistive Device (Sit-Stand Transfers) walker, front-wheeled  -MS     Row Name 02/08/23 1216          Gait/Stairs (Locomotion)    [place] Level (Gait) contact guard  -MS     Assistive Device (Gait) walker, front-wheeled  -MS     Distance in Feet (Gait) 4 ft  -MS     Deviations/Abnormal Patterns (Gait) anthony decreased;festinating/shuffling  -MS     Bilateral Gait Deviations forward flexed posture  -MS           User Key  (r) = Recorded By, (t) = Taken By, (c) = Cosigned By    Initials Name Provider Type    Jorden Guevara PT Physical Therapist               Obj/Interventions     Row Name 02/08/23 1216          Range of Motion Comprehensive    General Range of Motion bilateral lower extremity ROM WFL  -MS     Row Name 02/08/23 1216          Strength Comprehensive (MMT)    General Manual Muscle Testing (MMT) Assessment lower extremity strength deficits identified  -MS     Comment, General Manual Muscle Testing (MMT) Assessment B LE 4-/5  -MS     Row Name 02/08/23 1216          Balance    Balance Assessment standing dynamic balance  -MS     Dynamic Standing Balance standby assist  -MS     Row Name 02/08/23 1216          Sensory Assessment  (Somatosensory)    Sensory Assessment (Somatosensory) sensation intact  -MS           User Key  (r) = Recorded By, (t) = Taken By, (c) = Cosigned By    Initials Name Provider Type    Jorden Guevaar, PT Physical Therapist               Goals/Plan     Row Name 02/08/23 1229          Bed Mobility Goal 1 (PT)    Activity/Assistive Device (Bed Mobility Goal 1, PT) bed mobility activities, all  -MS     Traverse Level/Cues Needed (Bed Mobility Goal 1, PT) modified independence  -MS     Time Frame (Bed Mobility Goal 1, PT) long term goal (LTG);2 weeks  -MS     [facility] Name 02/08/23 1229          Transfer Goal 1 (PT)    Activity/Assistive Device (Transfer Goal 1, PT) transfers, all;sit-to-stand/stand-to-sit  -MS     Traverse Level/Cues Needed (Transfer Goal 1, PT) standby assist  -MS     Time Frame (Transfer Goal 1, PT) long term goal (LTG);2 weeks  -MS     Row Name 02/08/23 1229          Gait Training Goal 1 (PT)    Activity/Assistive Device (Gait Training Goal 1, PT) gait (walking locomotion);assistive device use  -MS     Traverse Level (Gait Training Goal 1, PT) standby assist  -MS     Distance (Gait Training Goal 1, PT) 200  -MS     Time Frame (Gait Training Goal 1, PT) long term goal (LTG);2 weeks  -MS     Row Name 02/08/23 1229          Patient Education Goal (PT)    Activity (Patient Education Goal, PT) ther exer x15 reps ea  -MS     Traverse/Cues/Accuracy (Memory Goal 2, PT) demonstrates adequately  -MS     Time Frame (Patient Education Goal, PT) long term goal (LTG);2 weeks  -MS     Row Name 02/08/23 1229          Therapy Assessment/Plan (PT)    Planned Therapy Interventions (PT) balance training;bed mobility training;gait training;home exercise program;transfer training;ROM (range of motion);stair training;strengthening;patient/family education  -MS           User Key  (r) = Recorded By, (t) = Taken By, (c) = Cosigned By    Initials Name Provider Type    Jorden Guevara, PT Physical Therapist                Clinical Impression     Row Name 02/08/23 1219          Pain    Pretreatment Pain Rating 0/10 - no pain  -MS     Posttreatment Pain Rating 0/10 - no pain  -MS     Row Name 02/08/23 1219          Plan of Care Review    Plan of Care Reviewed With patient  -MS     Progress no change  -MS     Outcome Evaluation Pt participated in PT eval this date. Pt oriented to person. Pt transferred to EOB with CGA. Pt then ambulated 4ft to the chair with CGA and Rwx. Pt reclined with chair alarm in place. Pt is expected to benefit from skilled PTx during this inpatient stay.  -MS     Row Name 02/08/23 1219          Therapy Assessment/Plan (PT)    Patient/Family Therapy Goals Statement (PT) to go back to [facility]  -MS     Rehab Potential (PT) good, to achieve stated therapy goals  -MS     Criteria for Skilled Interventions Met (PT) yes;meets criteria  -MS     Therapy Frequency (PT) other (see comments)  3-5x/week  -MS     Row Name 02/08/23 1219          Vital Signs    O2 Delivery Pre Treatment room air  -MS     Intra SpO2 (%) 88  -MS     O2 Delivery Intra Treatment room air  -MS     Post SpO2 (%) 94  -MS     O2 Delivery Post Treatment room air  -MS     Pre Patient Position Supine  -MS     Intra Patient Position Standing  -MS     Post Patient Position Sitting  -MS     Row Name 02/08/23 1219          Positioning and Restraints    Pre-Treatment Position in bed  -MS     Post Treatment Position chair  -MS     In Chair reclined;call light within reach;encouraged to call for assist;exit alarm on  -MS           User Key  (r) = Recorded By, (t) = Taken By, (c) = Cosigned By    Initials Name Provider Type    Jorden Guevara, PT Physical Therapist               Outcome Measures     Row Name 02/08/23 1230 02/08/23 0949       How much help from another person do you currently need...    Turning from your back to your side while in flat bed without using bedrails? 3  -MS 2  -RAUL    Moving from lying on back to sitting on the  side of a flat bed without bedrails? 3  -MS 2  -RAUL    Moving to and from a bed to a chair (including a wheelchair)? 3  -MS 2  -RAUL    Standing up from a chair using your arms (e.g., wheelchair, bedside chair)? 3  -MS 2  -RAUL    Climbing 3-5 steps with a railing? 2  -MS 2  -RAUL    To walk in hospital room? 3  -MS 2  -RAUL    AM-PAC 6 Clicks Score (PT) 17  -MS 12  -RAUL    Highest level of mobility 5 --> Static standing  -MS 4 --> Transferred to chair/commode  -RAUL          User Key  (r) = Recorded By, (t) = Taken By, (c) = Cosigned By    Initials Name Provider Type    MS Jorden Rausch, CARLITO Physical Therapist    Jaguar Adams RN Registered Nurse                             Physical Therapy Education     Title: PT OT SLP Therapies (In Progress)     Topic: Physical Therapy (In Progress)     Point: Mobility training (Done)     Learning Progress Summary           Patient Acceptance, E, VU by MS at 2/8/2023 1230    Comment: importance of mobility                   Point: Home exercise program (Done)     Learning Progress Summary           Patient Acceptance, E, VU by MS at 2/8/2023 1230    Comment: importance of mobility                   Point: Body mechanics (Not Started)     Learner Progress:  Not documented in this visit.          Point: Precautions (Not Started)     Learner Progress:  Not documented in this visit.                      User Key     Initials Effective Dates Name Provider Type Discipline    MS 07/01/22 -  Jorden Rausch, PT Physical Therapist PT              PT Recommendation and Plan  Planned Therapy Interventions (PT): balance training, bed mobility training, gait training, home exercise program, transfer training, ROM (range of motion), stair training, strengthening, patient/family education  Plan of Care Reviewed With: patient  Progress: no change  Outcome Evaluation: Pt participated in PT eval this date. Pt oriented to person. Pt transferred to EOB with CGA. Pt then ambulated 4ft to the chair with  CGA and Rwx. Pt reclined with chair alarm in place. Pt is expected to benefit from skilled PTx during this inpatient stay.     Time Calculation:    PT Charges     Row Name 02/08/23 1231             Time Calculation    Start Time 1038  -MS      PT Received On 02/08/23  -MS      PT Goal Re-Cert Due Date 02/18/23  -MS         Untimed Charges    PT Eval/Re-eval Minutes 45  -MS         Total Minutes    Untimed Charges Total Minutes 45  -MS       Total Minutes 45  -MS            User Key  (r) = Recorded By, (t) = Taken By, (c) = Cosigned By    Initials Name Provider Type    Jorden Guevara PT Physical Therapist              Therapy Charges for Today     Code Description Service Date Service Provider Modifiers Qty    [id number] HC PT EVAL LOW COMPLEXITY 3 2/8/2023 Jorden Rausch PT GP 1          PT G-Codes  AM-PAC 6 Clicks Score (PT): 17  PT Discharge Summary  Anticipated Discharge Disposition (PT): home with assist, home with outpatient therapy services    Jorden Rausch PT  2/8/2023

## 2023-02-08 NOTE — PROGRESS NOTES
[facility]    Name:  Noa Sutton   Age:  89 y.o.  Sex:  female  :  10/7/1926  MRN:  [id number]   Visit Number:  [id number]  Admission Date:  2023  Date Of Service:  23  Primary Care Physician:  Nori Wells APRN     LOS: 0 days :    Chief Complaint:      Shortness of breath    Subjective:    Patient seen and examined this morning at bedside.  Patient is found laying in bed having extensive conversation with staff.  Patient awake and oriented to self, unsure of month or year.  Tells me that she walks quite a bit every day at [facility].  She is resting on room air, unlabored. Pleasantly confused.  Appears back to baseline as I have cared for patient previously with admission.    Hospital Course:    Patient is a pleasant 89 year old female who presents to the hospital today with complaints of shortness of breath which started overnight, worse with laying flat.  Patient is a resident at [facility] facility.  Patient with past health history significant for Chronic Diastolic Heart Failure, Chronic Atrial Fibrillation not on anticoagulation, Stage 3a CKD, hypertension, hypothyroidism, hyponatremia, and dementia.  Patient's family is at bedside on arrival to the hospital.  On exam, patient is somnolent, laying in bed.  She mumbles when spoken to, mostly incomprehensible.  Son (Isaias) states that she does still walk with a walker, and he has noticed increased sleeping during the day since starting Remeron and increased confusion over the past week.  States her appetite has not been good. Was notified by facility that patient was sent to the hospital this morning for increased shortness of breath.  Oxygen at 98% on 2L on exam, removed at time of exam.     Work up in the emergency department noted Troponin T-26,25, BNP-4191 with baseline around 2000, CO2-36, chloride-96, creatinine-1.06, BUN-37, Alk Phos-164, ALT-91, AST-69 and total  bilirubin-1.7.  WBC-10, covid and influenza testing negative, and CXR noted findings suggestive of mild CHF, moderate atelectasis, and presumed large diaphragmatic hernia accounting for much of the density within the lung bases.  Patient required supplemental oxygen on arrival.  Blood cultures were obtained.  Patient was given Lasix 40mg IV.  With new oxygen requirement, hospitalist was consulted for admission.    Admitted to the hospital with good diuresis.  Oxygen weaned to room air.      Review of Systems:     All systems were reviewed and negative except as mentioned in subjective, assessment and plan.    Vital Signs:    Temp:  [97.4 °F (36.3 °C)-98 °F (36.7 °C)] 97.4 °F (36.3 °C)  Heart Rate:  [] 94  Resp:  [18] 18  BP: (105-127)/(55-85) 116/67    Intake and output:    I/O last 3 completed shifts:  In: -   Out: 300 [Urine:300]  No intake/output data recorded.    Physical Examination:    General Appearance:  Alert and cooperative, pleasant elderly female, no acute distress on exam   Head:  Atraumatic and normocephalic.   Eyes: Conjunctivae and sclerae normal, no icterus. No pallor.   Throat: No oral lesions, no thrush, oral mucosa moist.   Neck: Supple, trachea midline   Lungs:   Breath sounds heard bilaterally equally.  No wheezing or crackles. Unlabored on room air   Heart:  Normal S1 and S2, no murmur, no gallop, no rub. No JVD.   Abdomen:   Normal bowel sounds, no masses, no organomegaly. Soft, nontender, nondistended, no rebound tenderness.   Extremities: Supple, no edema, no cyanosis, no clubbing.   Skin: No bleeding or rash.   Neurologic: Alert and oriented to self, pleasantly confused on exam. No facial asymmetry. Moves all four limbs.      Laboratory results:    Results from last 7 days   Lab Units 02/08/23  0556 02/07/23  1056   SODIUM mmol/L 145 142   POTASSIUM mmol/L 4.4 4.4   CHLORIDE mmol/L 100 96*   CO2 mmol/L 35.4* 36.6*   BUN mg/dL 38* 37*   CREATININE mg/dL 1.03* 1.06*   CALCIUM mg/dL 9.1  9.5   BILIRUBIN mg/dL  --  1.7*   ALK PHOS U/L  --  164*   ALT (SGPT) U/L  --  91*   AST (SGOT) U/L  --  69*   GLUCOSE mg/dL 93 116*     Results from last 7 days   Lab Units 02/08/23  0556 02/07/23  1056   WBC 10*3/mm3 7.92 10.24   HEMOGLOBIN g/dL 13.9 15.1   HEMATOCRIT % 43.7 46.8*   PLATELETS 10*3/mm3 191 195         Results from last 7 days   Lab Units 02/07/23  1346 02/07/23  1056   HSTROP T ng/L 25* 26*     Results from last 7 days   Lab Units 02/07/23  1346 02/07/23  1111   BLOODCX  No growth at less than 24 hours No growth at less than 24 hours     No results for input(s): PHART, KZR4PRB, PO2ART, LAK7PKH, BASEEXCESS in the last 8760 hours.   I have reviewed the patient's laboratory results.    Radiology results:    XR Chest 1 View    Result Date: 2/7/2023  XR CHEST 1 VIEW-  HISTORY: Congestive heart failure/chronic obstructive pulmonary disease protocol , shortness of breath.  COMPARISON:  01/07/2023  FINDINGS:  Portable view of the chest demonstrates bibasilar opacities compatible with atelectasis and vascular congestion. There is no evidence of effusion, pneumothorax or other significant pleural disease. Large presumed diaphragmatic hernia is noted overlying the heart.  The heart size is normal.      Impression: 1. Findings suggestive of mild CHF. 2. Moderate atelectasis. 3. Presumed large diaphragmatic hernia accounting for much of the density within the lung bases.  This report was signed and finalized on 2/7/2023 11:40 AM by Guy Davis MD.    I have reviewed the patient's radiology reports.    Medication Review:     I have reviewed the patient's active and prn medications.     Problem List:      Acute on chronic congestive heart failure, unspecified heart failure type (HCC)      Assessment:    1. Acute on chronic diastolic heart failure  2. Chronic atrial fibrillation not on anticoagulation  3. Hypertension  4. Hypothyroidism  5. Chronic Dementia  6. CKD stage 3a  7. Impaired ADLs    Plan:    Acute on  Chronic Diastolic Heart Failure  -Given Lasix 40mg IV--will give another dose today then restart home meds tomorrow  -Takes Bumex 1mg daily  -Noted to need 2L supplemental oxygen in ED--currently stable on room air  -Sodium/Fluid restricted cardiac diet  -PT/OT evaluations  -Echo ordered  -Strict I&Os     Chronic Atrial Fibrillation  -Not anticoagluated  -Continue Metoprolol for rate control     Chronic/ Debility/ Increased confusion  -Home medications as reconciled  -PT/OT evaluations  -Case management consulted for d/c planning  -Patient recently placed on Remeron in hopes to stimulate appetite, however, family notes she has had increased sleeping during the day since beginning and is still not eating well  -Will consult nutrition  -Will hold Remeron on admission with increased sleeping  -UA negative for infection  -Patient known to me from prior hospitalization, awake, alert, and pleasantly confused on exam today.    DVT Prophylaxis: Lovenox  Code Status: DNR/DNI  Diet: Cardiac  Discharge Plan: D/C back to [facility] tomorrow    Soraya Reyes, APRN  02/08/23  11:41 EST    Dictated utilizing Dragon dictation.

## 2023-02-09 VITALS
BODY MASS INDEX: 22.03 KG/M2 | OXYGEN SATURATION: 92 % | HEART RATE: 98 BPM | DIASTOLIC BLOOD PRESSURE: 80 MMHG | TEMPERATURE: 97.6 F | RESPIRATION RATE: 18 BRPM | WEIGHT: 119.71 LBS | HEIGHT: 62 IN | SYSTOLIC BLOOD PRESSURE: 109 MMHG

## 2023-02-09 LAB
ANION GAP SERPL CALCULATED.3IONS-SCNC: 7.6 MMOL/L (ref 5–15)
BUN SERPL-MCNC: 38 MG/DL (ref 8–23)
BUN/CREAT SERPL: 41.8 (ref 7–25)
CALCIUM SPEC-SCNC: 9.3 MG/DL (ref 8.2–9.6)
CHLORIDE SERPL-SCNC: 95 MMOL/L (ref 98–107)
CO2 SERPL-SCNC: 36.4 MMOL/L (ref 22–29)
CREAT SERPL-MCNC: 0.91 MG/DL (ref 0.57–1)
DEPRECATED RDW RBC AUTO: 49.9 FL (ref 37–54)
EGFRCR SERPLBLD CKD-EPI 2021: 57.9 ML/MIN/1.73
ERYTHROCYTE [DISTWIDTH] IN BLOOD BY AUTOMATED COUNT: 15.7 % (ref 12.3–15.4)
GLUCOSE SERPL-MCNC: 108 MG/DL (ref 65–99)
HCT VFR BLD AUTO: 43.1 % (ref 34–46.6)
HGB BLD-MCNC: 13.9 G/DL (ref 12–15.9)
MCH RBC QN AUTO: 28.2 PG (ref 26.6–33)
MCHC RBC AUTO-ENTMCNC: 32.3 G/DL (ref 31.5–35.7)
MCV RBC AUTO: 87.4 FL (ref 79–97)
PLATELET # BLD AUTO: 195 10*3/MM3 (ref 140–450)
PMV BLD AUTO: 10.6 FL (ref 6–12)
POTASSIUM SERPL-SCNC: 4.1 MMOL/L (ref 3.5–5.2)
RBC # BLD AUTO: 4.93 10*6/MM3 (ref 3.77–5.28)
SODIUM SERPL-SCNC: 139 MMOL/L (ref 136–145)
WBC NRBC COR # BLD: 9.46 10*3/MM3 (ref 3.4–10.8)

## 2023-02-09 PROCEDURE — 80048 BASIC METABOLIC PNL TOTAL CA: CPT | Performed by: NURSE PRACTITIONER

## 2023-02-09 PROCEDURE — 99238 HOSP IP/OBS DSCHRG MGMT 30/<: CPT | Performed by: NURSE PRACTITIONER

## 2023-02-09 PROCEDURE — 85027 COMPLETE CBC AUTOMATED: CPT | Performed by: NURSE PRACTITIONER

## 2023-02-09 PROCEDURE — G0378 HOSPITAL OBSERVATION PER HR: HCPCS

## 2023-02-09 RX ORDER — BUMETANIDE 1 MG/1
1 TABLET ORAL DAILY
Status: DISCONTINUED | OUTPATIENT
Start: 2023-02-09 | End: 2023-02-09 | Stop reason: HOSPADM

## 2023-02-09 RX ORDER — LEVOTHYROXINE SODIUM 0.12 MG/1
125 TABLET ORAL DAILY
Start: 2023-02-09

## 2023-02-09 RX ADMIN — Medication 10 ML: at 08:27

## 2023-02-09 RX ADMIN — LEVOTHYROXINE SODIUM 88 MCG: 88 TABLET ORAL at 08:26

## 2023-02-09 RX ADMIN — PANTOPRAZOLE SODIUM 40 MG: 40 TABLET, DELAYED RELEASE ORAL at 06:13

## 2023-02-09 RX ADMIN — BUMETANIDE 1 MG: 1 TABLET ORAL at 11:06

## 2023-02-09 RX ADMIN — METOPROLOL SUCCINATE 100 MG: 100 TABLET, EXTENDED RELEASE ORAL at 08:26

## 2023-02-09 NOTE — PLAN OF CARE
Goal Outcome Evaluation:         VSS on RA. Pt. Alert this shift and conversant. Pleasantly confused. Pt. Worked with therapy with walker this shift with one assistance. Pt. Diuresed with adequate UOP noted. Possible D/C back to facility in AM. Pt. Voices no concerns at this time.    Daily Care Plan Summary: Heart Failure    Diuretic in use (IV or PO):   IV Yes        Daily weight (up or down): Down   loss:     lbs Unknown      Output > Intake (yes/no):Yes Yes      O2 Requirements (current, any change?): room air RA      Symptoms noted with Activity (Respiratory Tolerance, functional state):     No        Anticipated Discharge Plans:     D/C facility in AM

## 2023-02-09 NOTE — DISCHARGE SUMMARY
[facility]   DISCHARGE SUMMARY      Name:  Noa Sutton   Age:  89 y.o.  Sex:  female  :  10/7/1926  MRN:  [id number]   Visit Number:  [id number]    Admission Date:  2023  Date of Discharge:  2023  Primary Care Physician:  Nori Wells APRN    Important issues to note:    1.  Patient admitted to the hospital for acute on chronic diastolic heart failure exacerbation.    2.  Given IV Lasix for diuresis.  Oxygen weaned back to room air. Echo obtained and was stable when compared to 2022.    Mirtazapine held on admission with patient alertness much improved.     3.   Patient stable for discharge back to [facility] facility today.  May resume home medications at discharge.  Would recommend discontinuing Mirtazapine at discharge.  Nutrition was consulted and recommended encouraging PO diet and providing Boost Plus twice daily.  Possibly try Megace for appetite stimulation in the future if intake does not increase.  Return to the hospital as needed.      Discharge Diagnoses:     1. Acute on chronic diastolic heart failure, POA, resolved  2. Chronic atrial fibrillation not on anticoagulation  3. Hypertension  4. Hypothyroidism  5. Chronic Dementia  6. CKD stage 3a  7. Impaired ADLs    Problem List:     Active Hospital Problems    Diagnosis  POA   • **Acute on chronic congestive heart failure, unspecified heart failure type (HCC) [I50.9]  Yes   • Atrial fibrillation (HCC) [I48.91]  Yes   • Exudative age-related macular degeneration of left eye with active choroidal neovascularization (HCC) [H35.3221]  Yes   • Essential hypertension [I10]  Yes      Resolved Hospital Problems   No resolved problems to display.     Presenting Problem:    Chief Complaint   Patient presents with   • Shortness of Breath      Consults:     Consulting Physician(s)             None          History of presenting illness/Hospital Course:    Patient is a pleasant 89 year old female who presents  to the hospital today with complaints of shortness of breath which started overnight, worse with laying flat.  Patient is a resident at [facility].  Patient with past health history significant for Chronic Diastolic Heart Failure, Chronic Atrial Fibrillation not on anticoagulation, Stage 3a CKD, hypertension, hypothyroidism, hyponatremia, and dementia.  Patient's family is at bedside on arrival to the hospital.  On exam, patient is somnolent, laying in bed.  She mumbles when spoken to, mostly incomprehensible.  Son (Isaias) states that she does still walk with a walker, and he has noticed increased sleeping during the day since starting Remeron and increased confusion over the past week.  States her appetite has not been good. Was notified by facility that patient was sent to the hospital this morning for increased shortness of breath.  Oxygen at 98% on 2L on exam, removed at time of exam.     Work up in the emergency department noted Troponin T-26,25, BNP-4191 with baseline around 2000, CO2-36, chloride-96, creatinine-1.06, BUN-37, Alk Phos-164, ALT-91, AST-69 and total bilirubin-1.7.  WBC-10, covid and influenza testing negative, and CXR noted findings suggestive of mild CHF, moderate atelectasis, and presumed large diaphragmatic hernia accounting for much of the density within the lung bases.  Patient required supplemental oxygen on arrival.  Blood cultures were obtained.  Patient was given Lasix 40mg IV.  With new oxygen requirement, hospitalist was consulted for admission.     Admitted to the hospital with good diuresis with Lasix IV.  Oxygen weaned to room air.  Family noted patient has had increased daytime sleeping and less interactive since starting Mirtazapine.  Medication held on admission as patient was somnolent on exam and hard to arouse.  Patient back to baseline mentation without medication.      Patient stable for discharge back to [facility] today.  May resume home  medications at discharge.  Would recommend discontinuing Mirtazapine at discharge.  Nutrition was consulted and recommended encouraging PO diet and providing Boost Plus twice daily.  Possibly try Megace in the future for appetite stimulation if intake does not increase.  Return to the hospital as needed.       Vital Signs:    Temp:  [97.3 °F (36.3 °C)-97.8 °F (36.6 °C)] 97.3 °F (36.3 °C)  Heart Rate:  [] 97  Resp:  [16-20] 18  BP: (116-164)/(67-96) 164/89    Physical Exam:    General Appearance:  Alert and cooperative, pleasant elderly female, no acute distress on exam   Head:  Atraumatic and normocephalic.   Eyes: Conjunctivae and sclerae normal, no icterus. No pallor.   Ears:  Ears with no abnormalities noted.   Throat: No oral lesions, no thrush, oral mucosa moist.   Neck: Supple, trachea midline   Back:   No kyphoscoliosis present. No tenderness to palpation.   Lungs:   Breath sounds heard bilaterally equally.  No crackles or wheezing. Unlabored on room air   Heart:  Normal S1 and S2, no murmur, no gallop, no rub. No JVD.   Abdomen:   Normal bowel sounds, no masses, no organomegaly. Soft, nontender, nondistended, no rebound tenderness.   Extremities: Supple, no edema, no cyanosis, no clubbing.   Pulses: Pulses palpable bilaterally.   Skin: No bleeding or rash.   Neurologic: Alert and oriented to self, pleasantly confused. No facial asymmetry. Moves all four limbs.      Pertinent Lab Results:     Results from last 7 days   Lab Units 02/09/23  0530 02/08/23  0556 02/07/23  1056   SODIUM mmol/L 139 145 142   POTASSIUM mmol/L 4.1 4.4 4.4   CHLORIDE mmol/L 95* 100 96*   CO2 mmol/L 36.4* 35.4* 36.6*   BUN mg/dL 38* 38* 37*   CREATININE mg/dL 0.91 1.03* 1.06*   CALCIUM mg/dL 9.3 9.1 9.5   BILIRUBIN mg/dL  --   --  1.7*   ALK PHOS U/L  --   --  164*   ALT (SGPT) U/L  --   --  91*   AST (SGOT) U/L  --   --  69*   GLUCOSE mg/dL 108* 93 116*     Results from last 7 days   Lab Units 02/09/23  0529 02/08/23  0556  02/07/23  1056   WBC 10*3/mm3 9.46 7.92 10.24   HEMOGLOBIN g/dL 13.9 13.9 15.1   HEMATOCRIT % 43.1 43.7 46.8*   PLATELETS 10*3/mm3 195 191 195         Results from last 7 days   Lab Units 02/07/23  1346 02/07/23  1056   HSTROP T ng/L 25* 26*     Results from last 7 days   Lab Units 02/07/23  1056   PROBNP pg/mL 4,191.0*                 Results from last 7 days   Lab Units 02/07/23  1346 02/07/23  1111   BLOODCX  No growth at 24 hours No growth at 24 hours       Pertinent Radiology Results:    Imaging Results (All)     Procedure Component Value Units Date/Time    XR Chest 1 View [[id number]] Collected: 02/07/23 1106     Updated: 02/07/23 1143    Narrative:      XR CHEST 1 VIEW-     HISTORY: Congestive heart failure/chronic obstructive pulmonary disease  protocol , shortness of breath.     COMPARISON:  01/07/2023     FINDINGS:  Portable view of the chest demonstrates bibasilar opacities  compatible with atelectasis and vascular congestion. There is no  evidence of effusion, pneumothorax or other significant pleural disease.  Large presumed diaphragmatic hernia is noted overlying the heart.     The heart size is normal.       Impression:      1. Findings suggestive of mild CHF.  2. Moderate atelectasis.  3. Presumed large diaphragmatic hernia accounting for much of the  density within the lung bases.      This report was signed and finalized on 2/7/2023 11:40 AM by Guy Davis MD.          Echo:    Results for orders placed during the hospital encounter of 02/07/23    Adult Transthoracic Echo Complete With Contrast if Necessary Per Protocol    Interpretation Summary  1.  Normal left ventricular ventricular size with low normal LV systolic function, LVEF 50-55%.  2.  Mild hypertrophy of the basal septum.  3.  Grade 2 diastolic dysfunction.  4.  Mild right ventricular dilation with normal RV systolic function by TAPSE.  5.  Severe right atrial dilation.  6.  Mildly increased left atrial volume index.  7.  Mild aortic  regurgitation.  8.  Mild mitral regurgitation.  9.  Moderate to severe tricuspid regurgitation.    Condition on Discharge:      Stable.    Code status during the hospital stay:    Code Status and Medical Interventions:   Ordered at: 02/07/23 1712     Medical Intervention Limits:    NO intubation (DNI)     Code Status (Patient has no pulse and is not breathing):    No CPR (Do Not Attempt to Resuscitate)     Medical Interventions (Patient has pulse or is breathing):    Limited Support     Discharge Disposition:    Long Term Care (DC - External)    Discharge Medications:       Discharge Medications      Continue These Medications      Instructions Start Date   acetaminophen 500 MG tablet  Commonly known as: TYLENOL   1,000 mg, Oral, Every 6 Hours PRN      alendronate 70 MG tablet  Commonly known as: FOSAMAX   TAKE 1 TABLET EVERY 7 DAYS      bumetanide 1 MG tablet  Commonly known as: BUMEX   1 mg, Oral, Daily, And as needed      CALTRATE 600+D PO   1 tablet, Oral, Daily      docusate sodium 100 MG capsule  Commonly known as: COLACE   100 mg, Oral, Daily      metoprolol succinate  MG 24 hr tablet  Commonly known as: TOPROL-XL   TAKE ONE TABLET BY MOUTH EVERY DAY      omeprazole 20 MG capsule  Commonly known as: priLOSEC   TAKE ONE CAPSULE BY MOUTH EVERY DAY      ondansetron 4 MG tablet  Commonly known as: ZOFRAN   4 mg, Oral, Every 6 Hours PRN      potassium chloride 10 MEQ CR tablet   10 mEq, Oral, Daily      SYSTANE OP   1 drop, Both Eyes, Nightly, Apply to both eyes      Vitamin D3 50 MCG (2000 UT) tablet   50 mcg, Oral, Daily         Stop These Medications    mirtazapine 7.5 MG tablet  Commonly known as: REMERON        ASK your doctor about these medications      Instructions Start Date   levothyroxine 88 MCG tablet  Commonly known as: SYNTHROID, LEVOTHROID   88 mcg, Oral, Daily           Discharge Diet:     Diet Instructions     Diet: Specialty Diet; Thin Liquids, No Restrictions; Low Sodium      Discharge  Diet: Specialty Diet    Fluid Consistency: Thin Liquids, No Restrictions    Specialty Diets: Low Sodium        Activity at Discharge:       Follow-up Appointments:    Additional Instructions for the Follow-ups that You Need to Schedule     Discharge Follow-up with PCP   As directed       Currently Documented PCP:    Nori Wells APRN    PCP Phone Number:    [phone]     Follow Up Details: Follow up with facility provider    Follow Up: 1 Week            Follow-up Information     Nori Wells APRN .    Specialty: Nurse Practitioner  Why: Follow up with facility provider  Contact information:  [address] [address]  [phone]                       No future appointments.  Test Results Pending at Discharge:    Pending Labs     Order Current Status    Blood Culture With THEO - Blood, Arm, Left Preliminary result    Blood Culture With THEO - Blood, Hand, Left Preliminary result             FARTUN Corona  02/09/23  10:47 EST    Time: I spent 25 minutes on this discharge activity which included: face-to-face encounter with the patient, reviewing the data in the system, coordination of the care with the nursing staff as well as consultants, documentation, and entering orders.     Dictated utilizing Dragon dictation.

## 2023-02-09 NOTE — NURSING NOTE
Report called to JAYCEE Abdullahi at [facility]. All questions answered appropriately. Alex, son to transport pt. To facility after dentist appointment at 12:00. No s/s of acute distress noted.

## 2023-02-09 NOTE — CASE MANAGEMENT/SOCIAL WORK
Continued Stay Note  ISABELA Valencia     Patient Name: Noa Sutton  MRN: [id number]  Today's Date: 2/9/2023    Admit Date: 2/7/2023    Plan: Retrun to [facility]   Discharge Plan     Row Name 02/09/23 1058       Plan    Plan Comments Spoke to jose at [facility] they can accept pt back DC summary faxed to them Nursing to call report to [phone]               Discharge Codes    No documentation.               Expected Discharge Date and Time     Expected Discharge Date Expected Discharge Time    Feb 9, 2023             Elsy Rollins RN

## 2023-02-09 NOTE — PLAN OF CARE
Goal Outcome Evaluation: Pt being discharged back to [facility] today, family to transport

## 2023-02-10 NOTE — CASE MANAGEMENT/SOCIAL WORK
Case Management Discharge Note                Selected Continued Care - Discharged on 2/9/2023 Admission date: 2/7/2023 - Discharge disposition: Long Term Care (DC - External)    Destination Coordination complete.    Service Provider Selected Services Address Phone Fax Patient Preferred    [facility] [address] [place] [address] [phone] --          Durable Medical Equipment    No services have been selected for the patient.              Dialysis/Infusion    No services have been selected for the patient.              Home Medical Care    No services have been selected for the patient.              Therapy    No services have been selected for the patient.              Community Resources    No services have been selected for the patient.              Community & DME    No services have been selected for the patient.                  Transportation Services  Ambulance: [facility]    Final Discharge Disposition Code: 01 - home or self-care

## 2023-02-12 LAB
BACTERIA SPEC AEROBE CULT: NORMAL
BACTERIA SPEC AEROBE CULT: NORMAL

## 2023-06-03 NOTE — DISCHARGE INSTRUCTIONS
Return to ER for any worsening symptoms, have sent an antibiotic to your pharmacy, make sure to pick this up and take as directed.  Recommend calling orthopedics and following up with them, their number has been provided and you should call to schedule an appointment.  Additionally recommend follow-up with your primary care physician.  Tylenol as needed every 6-8 hours for pain, do not consume more than 4000 mg of Tylenol in 1 day.  Wear the back brace for support.  Do not overly extend yourself at your trunk.

## 2023-06-03 NOTE — ED PROVIDER NOTES
"Subjective  History of Present Illness:    This is a 89-year-old female, history of atrial fibrillation, hypertension, constipation, who presents emergency room today for fall that occurred earlier this date.  She is a resident of a assisted living facility [facility] here in [place].  She is oriented on arrival.  Reports a fall happened earlier in the day.  She is unsure why she fell, however denies any chest pain or syncope, and states that she just fell.  Denies any shortness of air.  Reports that she did hit her head.  No loss consciousness or vomiting or severe headaches afterwards.  She does report bilateral knee pain and left elbow pain, but denies any other physical complaints.  Denies any neck or back pain except \"only when you push on it\".  Reports lower back pain without urinary retention, saddle anesthesias, urinary or fecal incontinence.  She arrives via EMS.  She denies any blood thinners.  She does not appear to be on any anticoagulation per her chart review.  Denies any fevers or congestion or cough.      Nurses Notes reviewed and agree, including vitals, allergies, social history and prior medical history.     REVIEW OF SYSTEMS: All systems reviewed and not pertinent unless noted.  Review of Systems   Constitutional: Negative for fever.   Respiratory: Negative for cough and shortness of breath.    Cardiovascular: Negative for chest pain.   Musculoskeletal: Positive for arthralgias and back pain.   All other systems reviewed and are negative.      Past Medical History:   Diagnosis Date   • Constipation    • Disease of thyroid gland    • Hypertension        Allergies:    Patient has no known allergies.      Past Surgical History:   Procedure Laterality Date   • CHOLECYSTECTOMY     • THYROID SURGERY           Social History     Socioeconomic History   • Marital status:    Tobacco Use   • Smoking status: Never   • Smokeless tobacco: Never   Vaping Use   • Vaping Use: Never used   Substance " "and Sexual Activity   • Alcohol use: No   • Drug use: Never   • Sexual activity: Defer         Family History   Problem Relation Age of Onset   • Arthritis Mother    • Stroke Mother    • Heart attack Other    • Cancer Other        Objective  Physical Exam:  /92   Pulse 81   Temp 98 °F (36.7 °C) (Oral)   Resp 18   Ht 157.5 cm (62.01\")   Wt 53.5 kg (118 lb)   SpO2 97%   BMI 21.58 kg/m²      Physical Exam  Vitals and nursing note reviewed.   Constitutional:       General: She is not in acute distress.     Appearance: She is not ill-appearing, toxic-appearing or diaphoretic.      Comments: Pleasant appearing 89-year-old female   HENT:      Head: Normocephalic and atraumatic.      Nose: Nose normal. No congestion or rhinorrhea.      Mouth/Throat:      Mouth: Mucous membranes are moist.      Pharynx: Oropharynx is clear.   Eyes:      Extraocular Movements: Extraocular movements intact.      Pupils: Pupils are equal, round, and reactive to light.   Cardiovascular:      Rate and Rhythm: Normal rate. Rhythm irregular.      Pulses: Normal pulses.   Pulmonary:      Effort: Pulmonary effort is normal. No respiratory distress.      Breath sounds: Normal breath sounds. No stridor. No wheezing, rhonchi or rales.   Abdominal:      General: There is no distension.      Palpations: Abdomen is soft.      Tenderness: There is no abdominal tenderness. There is no guarding.   Musculoskeletal:         General: Tenderness present. No swelling. Normal range of motion.      Cervical back: Normal range of motion and neck supple. No rigidity or tenderness.      Comments: Minimal left elbow tenderness palpated.  All extremities with full range of motion.  Minimal tenderness palpated over the bilateral anterior knees.  No chest wall tenderness.  No hip tenderness or instability palpated, however patient does report minimal left hip pain.  No cervical or thoracic spine tenderness palpated.  There is minimal tenderness palpated in " midline of the lumbar region.  Neurovascularly intact bilaterally.  No palpable pain of the femur bilaterally.                                                     Neurological:      Mental Status: She is alert.               Procedures    ED Course:    ED Course as of 06/03/23 0052 Fri Jun 02, 2023 2209 EKG interpreted by me reveals atrial fibrillation with a rate of 101 bpm.  There is low QRS voltage.  There are nonspecific findings.  This is an abnormal appearing EKG. [TB]      ED Course User Index  [TB] Eleonora Beach MD       Lab Results (last 24 hours)     Procedure Component Value Units Date/Time    CBC Auto Differential [[id number]]  (Abnormal) Collected: 06/02/23 2148    Specimen: Blood Updated: 06/02/23 2157     WBC 8.54 10*3/mm3      RBC 4.85 10*6/mm3      Hemoglobin 14.7 g/dL      Hematocrit 44.8 %      MCV 92.4 fL      MCH 30.3 pg      MCHC 32.8 g/dL      RDW 14.4 %      RDW-SD 48.6 fl      MPV 11.0 fL      Platelets 204 10*3/mm3      Neutrophil % 77.4 %      Lymphocyte % 13.3 %      Monocyte % 8.1 %      Eosinophil % 0.5 %      Basophil % 0.2 %      Immature Grans % 0.5 %      Neutrophils, Absolute 6.61 10*3/mm3      Lymphocytes, Absolute 1.14 10*3/mm3      Monocytes, Absolute 0.69 10*3/mm3      Eosinophils, Absolute 0.04 10*3/mm3      Basophils, Absolute 0.02 10*3/mm3      Immature Grans, Absolute 0.04 10*3/mm3      nRBC 0.0 /100 WBC     Comprehensive Metabolic Panel [[id number]]  (Abnormal) Collected: 06/02/23 2148    Specimen: Blood Updated: 06/02/23 2217     Glucose 107 mg/dL      BUN 24 mg/dL      Creatinine 0.79 mg/dL      Sodium 137 mmol/L      Potassium 4.4 mmol/L      Comment: Slight hemolysis detected by analyzer. Results may be affected.        Chloride 95 mmol/L      CO2 31.7 mmol/L      Calcium 9.4 mg/dL      Total Protein 6.6 g/dL      Albumin 3.6 g/dL      ALT (SGPT) 22 U/L      AST (SGOT) 34 U/L      Comment: Slight hemolysis detected by analyzer. Results may be affected.         Alkaline Phosphatase 121 U/L      Total Bilirubin 1.2 mg/dL      Globulin 3.0 gm/dL      A/G Ratio 1.2 g/dL      BUN/Creatinine Ratio 30.4     Anion Gap 10.3 mmol/L      eGFR 68.6 mL/min/1.73     Narrative:      GFR Normal >60  Chronic Kidney Disease <60  Kidney Failure <15    The GFR formula is only valid for adults with stable renal function between ages 18 and 70.    High Sensitivity Troponin T [[id number]]  (Abnormal) Collected: 06/02/23 2148    Specimen: Blood Updated: 06/02/23 2242     HS Troponin T 59 ng/L     Narrative:      High Sensitive Troponin T Reference Range:  <10.0 ng/L- Negative Female for AMI  <15.0 ng/L- Negative Male for AMI  >=10 - Abnormal Female indicating possible myocardial injury.  >=15 - Abnormal Male indicating possible myocardial injury.   Clinicians would have to utilize clinical acumen, EKG, Troponin, and serial changes to determine if it is an Acute Myocardial Infarction or myocardial injury due to an underlying chronic condition.         COVID-19 and FLU A/B PCR - Swab, Nasopharynx [[id number]]  (Normal) Collected: 06/02/23 2245    Specimen: Swab from Nasopharynx Updated: 06/02/23 2313     COVID19 Not Detected     Influenza A PCR Not Detected     Influenza B PCR Not Detected    Narrative:      Fact sheet for providers: https://www.fda.gov/media/554143/download    Fact sheet for patients: https://www.fda.gov/media/234909/download    Test performed by PCR.    High Sensitivity Troponin T 2Hr [[id number]]  (Abnormal) Collected: 06/02/23 2353    Specimen: Blood Updated: 06/03/23 0026     HS Troponin T 56 ng/L      Comment: Specimen hemolyzed.  Results may be affected.        Troponin T Delta -3 ng/L     Narrative:      High Sensitive Troponin T Reference Range:  <10.0 ng/L- Negative Female for AMI  <15.0 ng/L- Negative Male for AMI  >=10 - Abnormal Female indicating possible myocardial injury.  >=15 - Abnormal Male indicating possible myocardial injury.   Clinicians would have to  utilize clinical acumen, EKG, Troponin, and serial changes to determine if it is an Acute Myocardial Infarction or myocardial injury due to an underlying chronic condition.         Urinalysis With Culture If Indicated - Urine, Clean Catch [[id number]]  (Abnormal) Collected: 06/03/23 0020    Specimen: Urine, Clean Catch Updated: 06/03/23 0029     Color, UA Yellow     Appearance, UA Clear     pH, UA <=5.0     Specific Gravity, UA 1.017     Glucose, UA Negative     Ketones, UA Negative     Bilirubin, UA Negative     Blood, UA Negative     Protein, UA Negative     Leuk Esterase, UA Small (1+)     Nitrite, UA Negative     Urobilinogen, UA 0.2 E.U./dL    Narrative:      In absence of clinical symptoms, the presence of pyuria, bacteria, and/or nitrites on the urinalysis result does not correlate with infection.    Urinalysis, Microscopic Only - Urine, Clean Catch [[id number]]  (Abnormal) Collected: 06/03/23 0020    Specimen: Urine, Clean Catch Updated: 06/03/23 0038     RBC, UA None Seen /HPF      WBC, UA 3-5 /HPF      Bacteria, UA Trace /HPF      Squamous Epithelial Cells, UA 3-6 /HPF      Hyaline Casts, UA 0-2 /LPF      Mucus, UA Trace /HPF      Methodology Manual Light Microscopy    Urine Culture - Urine, Urine, Clean Catch [[id number]] Collected: 06/03/23 0020    Specimen: Urine, Clean Catch Updated: 06/03/23 0045           CT Head Without Contrast    Result Date: 6/2/2023  FINAL REPORT TECHNIQUE: Multiple axial CT images were performed from the foramen magnum to the vertex. This study was performed with techniques to keep radiation doses as low as reasonably achievable (ALARA). Individualized dose reduction techniques using automated exposure control or adjustment of mA and/or kV according to the patient's size were employed. CLINICAL HISTORY: Fall FINDINGS: No acute intracranial hemorrhage or large acute cortical infarct.  The brain volume is normal for patient's age. Ventricles are normal in size and configuration.  No  midline shift.  The basal cisterns are patent.  No skull fracture.  The visualized paranasal sinuses and mastoid air cells are clear.     Impression: No acute intracranial hemorrhage or large acute cortical infarct. Authenticated and Electronically Signed by Gino Case MD on 06/02/2023 10:39:02 PM    CT Cervical Spine Without Contrast    Result Date: 6/2/2023  FINAL REPORT TECHNIQUE: Axial CT images were obtained from the skull base to the thoracic inlet.  Coronal and sagittal reformatted images were generated from the axial data set.  This study was performed with techniques to keep radiation doses as low as reasonably achievable (ALARA). Individualized dose reduction techniques using automated exposure control or adjustment of mA and/or kV according to the patient's size were employed. CLINICAL HISTORY: fall FINDINGS: There is no acute fracture or malalignment.  The facets are normally aligned.   Multilevel degenerative changes are present. No acute paraspinal abnormality.  Limited images of the lung apices are unremarkable.     Impression: No acute fracture or malalignment of the cervical spine. Authenticated and Electronically Signed by Gino Case MD on 06/02/2023 11:12:41 PM    CT Lumbar Spine Without Contrast    Result Date: 6/2/2023  FINAL REPORT TECHNIQUE: Axial CT images were obtained through the lumbar spine. Sagittal and coronal reformatted images were generated from the axial data set and provided for interpretation. This study was performed with techniques to keep radiation doses as low as reasonably achievable (ALARA). Individualized dose reduction techniques using automated exposure control or adjustment of mA and/or kV according to the patient's size were employed. CLINICAL HISTORY: fall, pain FINDINGS: The lumbar lordosis is preserved.  There is severe chronic T12 compression deformity with vertebra plana and retropulsion of bone.  There is chronic-appearing compression deformity of L1 and L4.   Superior endplate compression deformity of L3 could be acute, there is only mild height loss and no retropulsion of bone.  The facets are appropriately aligned. Multilevel degenerative changes are present.  No acute paraspinal abnormalities.     Impression: Possible acute L3 superior endplate compression fracture. Further evaluation with MRI may be helpful.  Multilevel degenerative changes. Authenticated and Electronically Signed by Gino Case MD on 06/02/2023 11:12:41 PM         MDM    Initial impression of presenting illness: 89-year-old female presents emergency room today for evaluation of fall    DDX: includes but is not limited to: Osseous abnormality including fracture or dislocation, electrolyte abnormality, dehydration, pneumonia, urinary tract infection, intracranial abnormality, other    Patient arrives hemodynamically stable afebrile nontachycardic nonhypoxic and nontoxic-appearing with vitals interpreted by myself.     Pertinent features from physical exam:Minimal left elbow tenderness palpated.  All extremities with full range of motion.  Minimal tenderness palpated over the bilateral anterior knees.  No chest wall tenderness.  No hip tenderness or instability palpated, however patient does report minimal left hip pain.  No cervical or thoracic spine tenderness palpated.  There is minimal tenderness palpated in midline of the lumbar region.  Neurovascularly intact bilaterally.  No palpable pain of the femur bilaterally.   Small skin tear abrasion of the left elbow nonbleeding.  No obvious swelling of the extremities.  Cardiac auscultation with irregularly irregular rhythm with known A-fib history.  Lungs clear bilaterally.    Initial diagnostic plan: Chest x-ray, bilateral knee x-ray, left hip x-ray, left femur x-ray, left shoulder x-ray, CT head without contrast, CT cervical spine, and CT lumbar spine in addition to laboratory studies including CBC CMP troponin and urinalysis in addition to an  EKG, COVID-19 screening    Results from initial plan were reviewed and interpreted by me revealing CBC unremarkable, CMP with a glucose of 107, BUN of 24, AST of 34 alkaline phosphatase of 121 and BUN/creatinine ratio of 30.4.  Possible acute L3 compression fracture with minimal loss of height per radiology interpretation of the lumbar spine.  No acute abnormality of the cervical spine or head per radiology on CT of the head and C-spine.  Plain film of the chest as interpreted by me with chronic changes that appear improved compared to prior.  No acute fracture of the elbow, shoulder, femur, hip, or bilateral knees as interpreted by me.  Delta troponin of 56 with -3 value.  Patient adamant Allies any chest pain or shortness of air or other symptoms.  Urinalysis with small 1+ leukocytes, nitrite negative.  COVID flu negative.  Given Trope was trending downward, and absence of chest pain or shortness of air, do not suspect ACS.  Suspect elevated troponins are likely due to patient's age.    Diagnostic information from other sources: Chart review.    Interventions / Re-evaluation: Tylenol for symptomatic treatment.  Patient reports she is not in much pain at this time.  Placed in a LSO brace.  Patient reports that she is feeling good at this time and symptom-free and not hurting.  Pain was well controlled with Tylenol, pain was extremely minimal on arrival.  Could be chronic fracture..  Family member now present at bedside reports that patient is at baseline mental status.  Given 500 mg of Keflex for mild UTI based on urinalysis.  Stable for discharge.    Results/clinical rationale were discussed with patient and family member at bedside.  Family member at bedside did discuss with me that this fracture identified on CT could be old as patient has a history of multiple fractures and severe thoracic area changes.    Consultations/Discussion of results with other physicians: Discussed and reviewed plain film imaging with  Dr. Beach prior to discharge.    Disposition plan: Discharge home.  Orthopedic and primary care follow-up.  Supportive care discussed with patient and family member present at bedside.  Return precautions given.  Family is agreeable to plan at bedside as well as patient.  Discharged home in good condition.  Will treat urine for mild possible UTI.  Culture pending at this time, however will start on Keflex 500 mg twice daily for 5 days.  -----    Final diagnoses:   Closed compression fracture of L3 vertebra, initial encounter   Pain in both knees, unspecified chronicity   Left elbow pain   Abrasion of left elbow, initial encounter   Urinary tract infection without hematuria, site unspecified        Antoine Del Rosario PA-C  06/03/23 0055

## 2023-06-04 PROBLEM — I21.4 NSTEMI (NON-ST ELEVATED MYOCARDIAL INFARCTION): Status: ACTIVE | Noted: 2023-01-01

## 2023-06-04 NOTE — ED NOTES
RN attempted to give patient PO meds. Patient coughing after sips of water, RN repeatedly having to remind patient to swallow. PO meds held, Dr. Flynn notified with results of repeat dysphagia screening.

## 2023-06-04 NOTE — CONSULTS
"     [facility] Cardiology Consult Note    Noa Sutton  10/7/1926  [id number]  06/04/23    Requesting Physician: Kerley, Brian Joseph, DO    Chief Complaint: Generalized weakness    History of Present Illness:   Mrs. Noa Sutton is a 89 y.o. female who is being seen by Cardiology for evaluation of elevated troponin level.  The patient has a past medical history significant for hypertension and hypothyroidism.  She has a past cardiac history significant for diastolic dysfunction, persistent atrial fibrillation, and moderate MR.  She initially presented to the [facility] emergency department for evaluation of generalized weakness and altered mental status.  Per the patient's son, yesterday the patient was found to have worsening confusion in the setting of underlying dementia.  On review of records, in the emergency department the patient reported she was \"not feeling well.\"  Per family report, the patient has not recently been complaining of chest pain or chest discomfort.  She did recently have a fall, for which she was evaluated in the emergency department.  Given her altered mental status, she was brought to the emergency department for evaluation.    Upon initial evaluation in the emergency department, an ECG showed atrial fibrillation with rapid ventricular rates.  Serial ECGs were performed, with mild elevation in leads I, aVL, and V2.  She underwent laboratory evaluation showing a mildly elevated creatinine of 1.1.  A lactic acid was found to be significantly elevated at 5.1.  A high-sensitivity troponin was initially elevated at 59 2 days ago.  In the emergency department, it was upward trending at 183, and continued to be upward trending at 734 this morning.  Currently, the patient is confused but verbal.  She denies chest pain or chest discomfort.  While in the emergency department, the ED physician discussed the patient's troponin elevation with her family who opted for " a noninvasive management strategy.  Cardiology is being consulted for further recommendations.    Prior Cardiac Testin. Last Coronary Angio: None  2. Prior Stress Testing: None  3. Last Echo:   1.  Normal left ventricular ventricular size with low normal LV systolic function, LVEF 50-55%.  2.  Mild hypertrophy of the basal septum.  3.  Grade 2 diastolic dysfunction.  4.  Mild right ventricular dilation with normal RV systolic function by TAPSE.  5.  Severe right atrial dilation.  6.  Mildly increased left atrial volume index.  7.  Mild aortic regurgitation.  8.  Mild mitral regurgitation.  9.  Moderate to severe tricuspid regurgitation  4. Prior Holter Monitor: None    Review of Systems:   Review of Systems    Past Medical History:   Past Medical History:   Diagnosis Date    A-fib     Constipation     Disease of thyroid gland     Hypertension        Past Surgical History:   Past Surgical History:   Procedure Laterality Date    CHOLECYSTECTOMY      THYROID SURGERY         Family History:   Family History   Problem Relation Age of Onset    Arthritis Mother     Stroke Mother     Heart attack Other     Cancer Other        Social History:   Social History     Socioeconomic History    Marital status:    Tobacco Use    Smoking status: Never    Smokeless tobacco: Never   Vaping Use    Vaping Use: Never used   Substance and Sexual Activity    Alcohol use: No    Drug use: Never    Sexual activity: Defer       Medications:     Current Facility-Administered Medications:     [START ON 2023] aspirin EC tablet 81 mg, 81 mg, Oral, Daily, Barak Flynn MD    heparin 38724 units/250 ml (100 units/ml) in D5W, 12 Units/kg/hr, Intravenous, Titrated, Eleonora Beach MD, Last Rate: 6.42 mL/hr at 23 0456, 12 Units/kg/hr at 23 0456    levothyroxine (SYNTHROID, LEVOTHROID) tablet 125 mcg, 125 mcg, Oral, Q AM, Barak Flynn MD    metoprolol succinate XL (TOPROL-XL) 24 hr tablet 50 mg, 50 mg, Oral,  Q24H, Barak Flynn MD    pantoprazole (PROTONIX) EC tablet 40 mg, 40 mg, Oral, QAM AC, Barak Flynn MD    Pharmacy to Dose Heparin, , Does not apply, Continuous PRN, Eleonora Beach MD    sodium chloride 0.9 % flush 10 mL, 10 mL, Intravenous, PRN, Eleonora Beach MD    sodium chloride 0.9 % infusion, 100 mL/hr, Intravenous, Continuous, Barak Flynn MD, Last Rate: 100 mL/hr at 06/04/23 0935, 100 mL/hr at 06/04/23 0935    Allergies:   No Known Allergies    Physical Exam:  Vital Signs:   Vitals:    06/04/23 0800 06/04/23 0904 06/04/23 1004 06/04/23 1008   BP: (!) 89/75 92/64     BP Location:  Right arm     Patient Position:  Lying     Pulse: 111 115 (!) 124    Resp:  16     Temp:       TempSrc:       SpO2: 96% 97%  94%   Weight:  56.8 kg (125 lb 3.5 oz)     Height:           Physical Exam    Results Review:   Results from last 7 days   Lab Units 06/04/23  0148   SODIUM mmol/L 136   POTASSIUM mmol/L 4.7   CHLORIDE mmol/L 93*   CO2 mmol/L 21.2*   BUN mg/dL 36*   CREATININE mg/dL 1.11*   CALCIUM mg/dL 9.4   BILIRUBIN mg/dL 1.1   ALK PHOS U/L 159*   ALT (SGPT) U/L 55*   AST (SGOT) U/L 103*   GLUCOSE mg/dL 188*     Results from last 7 days   Lab Units 06/04/23  0404 06/04/23  0148 06/02/23  2353   CK TOTAL U/L 271*  --   --    HSTROP T ng/L 734* 183* 56*     Results from last 7 days   Lab Units 06/04/23  0200 06/02/23  2148   WBC 10*3/mm3 9.58 8.54   HEMOGLOBIN g/dL 14.8 14.7   HEMATOCRIT % 46.2 44.8   PLATELETS 10*3/mm3 218 204     Results from last 7 days   Lab Units 06/04/23  0334   INR  1.12*   APTT seconds 28.0*     Results from last 7 days   Lab Units 06/04/23  0148   MAGNESIUM mg/dL 1.7           I personally viewed and interpreted the patient's EKG/Telemetry data     Assessment / Plan:     1.  Coronary artery disease / NSTEMI  -- No known coronary history, however the patient does have cardiovascular risk factors  -- Changes on ECG concerning for ischemia, do not meet STEMI criteria  -- Troponin  continues to be upward trending at 734 this morning, consistent with type I NSTEMI  -- No current chest pain, no recent reported anginal symptoms  -- Discussed management strategies with the patient's son, and given her age, frailty, and underlying dementia they have opted for a noninvasive strategy  -- Can change heparin to Lovenox per ACS protocol  -- Continue aspirin, start Plavix  -- Start high intensity statin  -- Echocardiogram pending to evaluate LV systolic function    2.  Persistent atrial fibrillation  --Now rate controlled  --Previously declined anticoagulation due to high fall risk  --Continue metoprolol for rate control      Thank you for allowing me to participate in the care of your patient. Please do not hesitate to contact me with additional questions or concerns.     NIGHAT Bartlett MD  Interventional Cardiology   06/04/23  10:20 EDT

## 2023-06-04 NOTE — CASE MANAGEMENT/SOCIAL WORK
Case Management Discharge Note                Selected Continued Care - Admitted Since 2023       Destination    No services have been selected for the patient.                Durable Medical Equipment    No services have been selected for the patient.                Dialysis/Infusion    No services have been selected for the patient.                Home Medical Care    No services have been selected for the patient.                Therapy    No services have been selected for the patient.                Community [facility]    No services have been selected for the patient.                [facility]    No services have been selected for the patient.                         Final Discharge Disposition Code: 20 -

## 2023-06-04 NOTE — PHARMACY RECOMMENDATION
"Pharmacy Consult - Enoxaparin Dosing  Pharmacy was consulted to dose enoxaparin for  Noa Sutton, a 89 y.o. female  157.5 cm (62\") 56.8 kg (125 lb 3.5 oz)    Indication: ACS    Allergies  Patient has no known allergies.    Relevant clinical data and objective history reviewed:   [Ht: 157.5 cm (62\"); Wt: 56.8 kg (125 lb 3.5 oz)]  Body mass index is 22.9 kg/m².  Estimated Creatinine Clearance: 26.6 mL/min (A) (by C-G formula based on SCr of 1.11 mg/dL (H)).  Results from last 7 days   Lab Units 06/04/23  0200 06/04/23  0148 06/02/23  2148   HEMOGLOBIN g/dL 14.8  --  14.7   HEMATOCRIT % 46.2  --  44.8   PLATELETS 10*3/mm3 218  --  204   CREATININE mg/dL  --  1.11* 0.79       Asessment/Plan  Initiate enoxaparin 1 mg/kg SQ every 24 hours.   Pharmacy will monitor renal function and clinical status and adjust the dose and/or frequency as needed.    Thanks,   Marcy Emery, PharmD, BCPS  6/4/2023  11:32 EDT    "

## 2023-06-04 NOTE — DISCHARGE SUMMARY
[facility]   DEATH SUMMARY      Name:  Noa Sutton   Age:  89 y.o.  Sex:  female  :  10/7/1926  MRN:  [id number]   Visit Number:  [id number]    Admission Date:  2023  Date and Time of Death:  Date and Time of Death: 2023 at 1505  Primary Care Physician:  Nori Wells APRN    Final Diagnoses:     Acute non-ST elevation myocardial infarction, POA.  Bilateral aspiration pneumonia, POA.  Atrial fibrillation with rapid ventricular rate, not on anticoagulation.  Essential hypertension.  Chronic kidney disease stage II.  Impaired mobility and ADLs.  Acquired hypothyroidism.  Chronic vertebral fractures.    Problem List:     Active Hospital Problems    Diagnosis  POA    **NSTEMI (non-ST elevated myocardial infarction) [I21.4]  Yes    Atrial fibrillation [I48.91]  Yes    Essential hypertension [I10]  Yes    Acquired hypothyroidism [E03.9]  Yes      Resolved Hospital Problems   No resolved problems to display.     Presenting Problem:    Chief Complaint   Patient presents with    Weakness - Generalized      Consults:     Consulting Physician(s)       Provider Relationship Specialty    Bello Bartlett MD Consulting Physician Cardiology          Procedures Performed:    None.    History of presenting illness/Hospital Course:    Ms. Sutton is a 89-year-old female, resident of assisted living facility at [facility], was brought to the emergency room by EMS with symptoms of generalized weakness and altered mental status.  Patient in fact was seen in the emergency room on 2023 after a fall.  Evaluation done in the emergency room did not reveal hip fractures but showed compression fracture of L3 vertebra.  Abrasion of the left elbow noted.  She was also noted to have a urinary tract infection and was discharged back on Keflex.  High-sensitivity troponin at that time was 59 with a repeat at 56.  According to the son, who spent some time with the patient yesterday, she  was able to walk and have her lunch but was feeling weak.  Patient was subsequently more drowsy and confused which brought her to the emergency room.  According to the son, patient did not complain of any chest pain at any point yesterday.  No prior history of coronary artery disease.     In the emergency room, she was afebrile but was noted to be in atrial fibrillation with rapid ventricular rate in the 130s and saturating at 94% on 2 L.  Initial blood pressure was 103/88 but subsequently dropped into the systolic 80s.  She received a liter of normal saline bolus in the emergency room along with IV Cardizem 10 mg push with improvement in her heart rate.  Troponin T was 183 with a repeat at 734.  Initial lactic acid was 5.1 with a repeat at 3.9.  CMP was remarkable for a BUN of 36, creatinine of 1.11 (baseline 0.8), glucose 188, alkaline phosphatase 159, , ALT 55.  Procalcitonin was within normal limits.  CBC was within normal limits.  ABG showed a pH of 7.38, PCO2 42, PO2 99 and bicarb of 25 on 2 L.  Chest x-ray showed cardiomegaly with interstitial disease, stable from prior exam.  CT of the head without contrast showed no acute abnormalities.  CT angiogram of the aorta and bilateral iliofemoral runoff showed poor contrast opacification but no significant aortic thrombus or occlusion of the runoff vessels noted.  Bilateral lung groundglass opacities noted which are nonspecific and could be edema versus atelectasis.  Large hiatal hernia was noted.  Multiple thoracic and lumbar vertebral fractures noted including T11, T12, L1, L3 and L4.     Patient's condition was discussed with Dr. Bartlett who recommended heparin drip and the patient was admitted to the medical floor with telemetry.  Unfortunately, patient was continued to be drowsy but was able to answer a few questions initially.  She became more obtunded and dyspneic.  She was placed on morphine for comfort measures.  After a few hours in the afternoon,  patient became apneic and was noted to have asystole with the granddaughter at the bedside.  Patient was declared dead at 3:05 PM on 6/4/2023.  A pause for life, moment of prayer observed at the bedside with the nursing staff and the patient's granddaughter.    Physical Exam:    Patient with no spontaneous breathing.  No carotid pulse palpable.  On auscultation no heart sounds heard.  Cardiac monitor showing flatline.  Pupils bilaterally dilated and fixed with absent corneal reflex.    Pertinent Lab Results:     Results from last 7 days   Lab Units 06/04/23  0148 06/02/23  2148   SODIUM mmol/L 136 137   POTASSIUM mmol/L 4.7 4.4   CHLORIDE mmol/L 93* 95*   CO2 mmol/L 21.2* 31.7*   BUN mg/dL 36* 24*   CREATININE mg/dL 1.11* 0.79   CALCIUM mg/dL 9.4 9.4   BILIRUBIN mg/dL 1.1 1.2   ALK PHOS U/L 159* 121*   ALT (SGPT) U/L 55* 22   AST (SGOT) U/L 103* 34*   GLUCOSE mg/dL 188* 107*     Results from last 7 days   Lab Units 06/04/23  0200 06/02/23  2148   WBC 10*3/mm3 9.58 8.54   HEMOGLOBIN g/dL 14.8 14.7   HEMATOCRIT % 46.2 44.8   PLATELETS 10*3/mm3 218 204     Results from last 7 days   Lab Units 06/04/23  0334   INR  1.12*     Results from last 7 days   Lab Units 06/04/23  0404 06/04/23  0148 06/02/23  2353   CK TOTAL U/L 271*  --   --    HSTROP T ng/L 734* 183* 56*       Results from last 7 days   Lab Units 06/04/23  0214   PH, ARTERIAL pH units 7.382   PO2 ART mm Hg 98.9   PCO2, ARTERIAL mm Hg 42.0   HCO3 ART mmol/L 25.0     Results from last 7 days   Lab Units 06/04/23  0200 06/04/23  0155 06/03/23  0020   BLOODCX  No growth at less than 24 hours No growth at less than 24 hours  --    URINECX   --   --  25,000 CFU/mL Mixed Mikki Isolated     Pertinent Radiology Results:    Imaging Results (All)       Procedure Component Value Units Date/Time    XR Chest 1 View [[id number]] Collected: 06/04/23 0657     Updated: 06/04/23 0701    Narrative:      PROCEDURE: XR CHEST 1 VW-     HISTORY: Weak/Dizzy/AMS triage protocol      COMPARISON: 2 days prior.     FINDINGS: The heart is enlarged with large hiatal hernia, similar to  prior exam. There is moderate, bilateral interstitial disease and  bronchial wall thickening, similar to prior. Aortic mural calcifications  noted.. The lungs are clear. The mediastinum is unremarkable. There is  no pneumothorax.  There are no acute osseous abnormalities.       Impression:      Cardiomegaly and interstitial disease stable from prior..           This report was signed and finalized on 6/4/2023 6:59 AM by Pao Koo MD.    CT Angio Abdominal Aorta Bilateral Iliofem Runoff [[id number]] Collected: 06/04/23 0518     Updated: 06/04/23 0520    Narrative:      FINAL REPORT    TECHNIQUE:  null    CLINICAL HISTORY:  cool lower extremities    COMPARISON:  null    FINDINGS:  CT angiography abdomen and pelvis with bilateral lower extremity runoff using IV contrast. 3D post processing.    Comparison: None    Findings:    Dense contrast within the right heart with significant reflux into the inferior vena cava.    There is limited contrast opacification of the systemic arterial system.    No aneurysm or dissection is seen.    No intraluminal thrombus within the aorta is seen.    The iliofemoral, popliteal, and proximal trifurcation vessels appear patent. The mid to distal trifurcation vessels in the leg are unable to be clearly visualized.    Small right pleural effusion.    Mild ground-glass opacities at the lung bases are nonspecific.    Large hiatal hernia.    Cardiomegaly.    Cholecystectomy. Abdominal solid organs appear unremarkable.    No bowel obstruction, pneumoperitoneum, or pneumatosis.    Probable fundal fibroid 5.2 cm diameter. Ovaries not definitely identified.    Appendix not identified.    Small amount of ascites in the pelvis is nonspecific.    T12 vertebral plana and fractures of T11, L1, L3, and L4 appear old.    Moderate bilateral knee degenerative changes. No joint effusions.    Left tibial  intramedullary nail. No hardware complications.    Soft tissue swelling at the ankle and feet. No localized fluid collections.      Impression:      Impression:    1. There is limited contrast opacification of the arterial system. No intraluminal thrombus in the aorta is seen. No occlusion of the runoff vessels is seen, however the mid to distal trifurcation vessels are unable to be clearly visualized due to the   lack of IV contrast.    2. Bilateral lung ground-glass opacities are nonspecific. This could be edema or atelectasis.    3. Large hiatal hernia.    4. Additional findings as above.    Authenticated and Electronically Signed by Kristen Rosado MD  on 06/04/2023 05:18:57 AM    CT Head Without Contrast [[id number]] Collected: 06/04/23 0320     Updated: 06/04/23 0321    Narrative:      FINAL REPORT    TECHNIQUE:  null    CLINICAL HISTORY:  ams    COMPARISON:  null    FINDINGS:  CT head without contrast    Comparison: CT/SR - CT HEAD WO CONTRAST - 6/2/23 21:58 EDT    Findings:    No intra-axial mass, midline shift, hydrocephalus, or acute hemorrhage.    Old small right basal ganglia lacunar infarcts.    Moderate atrophy like change.    Mild nonspecific white matter hypodensity.    There is no sinus or mastoid fluid.    The orbits are within normal limits.    There is no acute fracture.      Impression:      IMPRESSION:    1. No acute intracranial findings    Authenticated and Electronically Signed by Kristen Rosado MD  on 06/04/2023 03:20:09 AM     Code status during the hospital stay:    Code Status and Medical Interventions:   Ordered at: 06/04/23 1030     Medical Intervention Limits:    NO intubation (DNI)    NO cardioversion    NO dialysis     Level Of Support Discussed With:    Health Care Surrogate     Code Status (Patient has no pulse and is not breathing):    No CPR (Do Not Attempt to Resuscitate)     Medical Interventions (Patient has pulse or is breathing):    Limited Support     Release to  patient:    Routine Release     Discharge Disposition:    Patient pronounced dead at 1505 on 6/4/2023.    Test Results Pending at Discharge:    Pending Labs       Order Current Status    Blood Culture With THEO - Blood, Arm, Left Preliminary result    Blood Culture With THEO - Blood, Hand, Left Preliminary result          2D echocardiogram was done on 6/4/2023 and the report is currently pending.       Barak Flynn MD  06/04/23  15:13 EDT    Time: I spent 25 minutes on this discharge activity which included: face-to-face encounter with the patient and/or family, reviewing the data in the system, coordination of the care with the nursing staff as well as consultants, documentation, and entering orders.     Dictated utilizing Dragon dictation.

## 2023-06-04 NOTE — ED PROVIDER NOTES
TRIAGE CHIEF COMPLAINT:     Nursing and triage notes reviewed    Chief Complaint   Patient presents with    Weakness - Generalized      HPI: Noa Sutton is a 89 y.o. female who presents to the emergency department complaining of generalized weakness and altered mental status.  Patient arrives from the nursing home for general fatigue and weakness and not feeling very well.  Patient states she does not feel well and is somewhat confused.  She denies focal discomfort.  She denies pain in her chest or shortness of breath.  Patient does have a history of atrial fibrillation and is not anticoagulated, this is a chronic condition.  Patient has not had a cough.  She was seen in the emergency department yesterday for a fall.    REVIEW OF SYSTEMS: All other systems reviewed and are negative     PAST MEDICAL HISTORY:   Past Medical History:   Diagnosis Date    Constipation     Disease of thyroid gland     Hypertension         FAMILY HISTORY:   Family History   Problem Relation Age of Onset    Arthritis Mother     Stroke Mother     Heart attack Other     Cancer Other         SOCIAL HISTORY:   Social History     Socioeconomic History    Marital status:    Tobacco Use    Smoking status: Never    Smokeless tobacco: Never   Vaping Use    Vaping Use: Never used   Substance and Sexual Activity    Alcohol use: No    Drug use: Never    Sexual activity: Defer        SURGICAL HISTORY:   Past Surgical History:   Procedure Laterality Date    CHOLECYSTECTOMY      THYROID SURGERY          CURRENT MEDICATIONS:      Medication List        ASK your doctor about these medications      acetaminophen 500 MG tablet  Commonly known as: TYLENOL     alendronate 70 MG tablet  Commonly known as: FOSAMAX  TAKE 1 TABLET EVERY 7 DAYS     bumetanide 1 MG tablet  Commonly known as: BUMEX     CALTRATE 600+D PO     cephalexin 500 MG capsule  Commonly known as: KEFLEX  Take 1 capsule by mouth 2 (Two) Times a Day for 5 days.     docusate sodium  100 MG capsule  Commonly known as: COLACE     levothyroxine 125 MCG tablet  Commonly known as: SYNTHROID, LEVOTHROID  Take 1 tablet by mouth Daily.     metoprolol succinate  MG 24 hr tablet  Commonly known as: TOPROL-XL  TAKE ONE TABLET BY MOUTH EVERY DAY     omeprazole 20 MG capsule  Commonly known as: priLOSEC  TAKE ONE CAPSULE BY MOUTH EVERY DAY     ondansetron 4 MG tablet  Commonly known as: ZOFRAN     potassium chloride 10 MEQ CR tablet     SYSTANE OP     Vitamin D3 50 MCG (2000 UT) tablet               ALLERGIES: Patient has no known allergies.     PHYSICAL EXAM:   VITAL SIGNS:   Vitals:    06/04/23 0202   BP: 90/70   Pulse: 87   Resp: 12   Temp:    SpO2: 95%      CONSTITUTIONAL: Awake, peers uncomfortable, somewhat confused but does answer most questions.  Diaphoretic.  HENT: Atraumatic, normocephalic, oral mucosa pink and moist, airway patent. Nares patent without drainage. External ears normal.   EYES: Conjunctivae clear   NECK: Trachea midline, nontender, supple   CARDIOVASCULAR: Tachycardic with an irregularly irregular rhythm, No murmurs, rubs, gallops   PULMONARY/CHEST: Clear to auscultation, no rhonchi, wheezes, or rales. Symmetrical breath sounds.  ABDOMINAL: Nondistended, soft, nontender - no rebound or guarding.  NEUROLOGIC: Nonfocal, moving all four extremities, no gross sensory or motor deficits.   EXTREMITIES: No clubbing, cyanosis, or edema   SKIN: Warm, Dry, No erythema, No rash     ED COURSE / MEDICAL DECISION MAKING:   Noa Sutton is a 89 y.o. female who presents to the emergency department for evaluation of generalized weakness.  Patient is noted to be significantly tachycardic on arrival in the emergency department.  Patient's blood pressure is in the low normal range.  Appears unwell upon arrival.    Differential diagnosis includes ACS, atrial fibrillation, infection among other etiologies.    An EKG, CBC, CMP, lactic acid, blood gas was ordered for further evaluation of the  patient's presentation.    Diagnostic information from other sources: EMS providers, nursing facility    Interventions: IV fluids, Cardizem, heparin    Initial EKG interpreted by me reveals atrial fibrillation with rapid ventricular response.  There is a rate of 159 bpm.  There are nonspecific ST and T wave changes.  There is nonspecific intraventricular conduction block.    Repeat EKG interpreted by me reveals atrial fibrillation with a right bundle branch block.  There is a rate of 91 bpm.  There are some nonspecific ST segment abnormalities primarily leads I, aVL, V2.  There is an abnormal appearing EKG.    Third EKG interpreted by me reveals atrial fibrillation with a rate of 109 bpm.  There again demonstrated ST segment abnormalities.  No noted reciprocal changes.  There is a right bundle branch block.  This is an abnormal appearing EKG.    Narrative: Patient presents to the emergency department with fatigue and weakness.  Patient noted to be in atrial fibrillation with rapid ventricular response upon arrival.  Patient was placed on the cardiac monitor.  She was given Cardizem with improvement in her heart rate.  Her symptoms significantly improved with this therapy as well and she states she feels much better.  Initial EKG shows atrial fibrillation with rapid ventricular response.  Subsequent EKGs do show some ST segment abnormalities that appear new compared to previous.  I contacted the interventional cardiologist on-call upon receiving this EKG to discuss.  The cardiologist reviewed the EKG and did note the changes, however did not note any reciprocal changes and did not feel that this EKG represents an acute STEMI at this time.  He is recommended using heparin and admission to the hospital.    Initially patient's feet were cool to touch, she did have posterior tibial pulses and good capillary refill.  CT angiogram obtained.  Per radiology report no evidence of obvious occlusion although bolus timing was  not perfect.    I did discuss the case with the patient's son.  Given her age he does not wish to pursue overly aggressive care and does not feel a cath is necessary at this time.  He was comfortable with the heparin infusion and admission to the hospital for further treatment and evaluation.    Patient has a calculated heart score of 7    Since delta troponin was significantly elevated.  Lactic acid also significantly elevated at 5.1.  Patient's prognosis at this time remains guarded.    Critical care time for this encounter excluding any procedure: 45 minutes    DECISION TO DISCHARGE/ADMIT: see ED care timeline     FINAL IMPRESSION:   1 --NSTEMI  2 --   3 --     Electronically signed by: Eleonora Beach MD, 6/4/2023 02:25 EDT       Eleonora Beach MD  06/04/23 0637       Eleonora Beach MD  06/04/23 0638

## 2023-06-04 NOTE — ED NOTES
Per House Supervisor a room is being cleaned at this time. Awaiting return call with bed assignment.

## 2023-06-05 LAB
BH CV ECHO MEAS - RVSP: 38 MMHG
LV EF 2D ECHO EST: 30 %

## 2023-06-05 NOTE — PAYOR COMM NOTE
"To:  [facility]  From: Linda Ortega RN  Phone: [phone]  Fax: [phone]  NPI: [id number]  TIN: [id number]  Member ID: [id number]   MRN: [id number]    Noa Sutton (Dcsd. Female)       Date of Birth   10/07/1926    Social Security Number       Address   [address] [place] [address]    Home Phone   [phone]    MRN   [id number]       Orthodox   None    Marital Status                               Admission Date   23    Admission Type   Emergency    Admitting Provider   Kerley, Brian Joseph, DO    Attending Provider       Department, Room/Bed   [facility] TELEMETRY 4 429/       Discharge Date   2023    Discharge Disposition       Discharge Destination   Other                              Attending Provider: (none)   Allergies: No Known Allergies    Isolation: None   Infection: COVID (rule out) (23)   Code Status: Prior    Ht: 157.5 cm (62.01\")   Wt: 56.8 kg (125 lb 3.5 oz)    Admission Cmt: None   Principal Problem: NSTEMI (non-ST elevated myocardial infarction) [I21.4]                   Active Insurance as of 2023       Primary Coverage       Payor Plan Insurance Group Employer/Plan Group    [facility] MEDICARE REPLACEMENT [facility] MEDICARE REPLACEMENT [id number]       Payor Plan Address Payor Plan Phone Number Payor Plan Fax Number Effective Dates    [address] [address]   2016 - None Entered    [place] [id number]         Subscriber Name Subscriber Birth Date Member ID       NOA SUTTON 10/7/1926 [id number]                     Emergency Contacts        (Rel.) Home Phone Work Phone Mobile Phone    Isaias Sutton (Son) [phone] -- --    JAMES BRIDGES (Grandchild) [phone] -- --                 Discharge Summary        Barak Flynn MD at 23 1513              [facility] HOSPITALIST   DEATH SUMMARY      Name:  Noa Sutton   Age:  89 y.o.  Sex:  female  :  10/7/1926  MRN:  " [id number]   Visit Number:  [id number]    Admission Date:  6/4/2023  Date and Time of Death:  Date and Time of Death: 6/4/2023 at 1505  Primary Care Physician:  Nori Wells APRN    Final Diagnoses:     Acute non-ST elevation myocardial infarction, POA.  Bilateral aspiration pneumonia, POA.  Atrial fibrillation with rapid ventricular rate, not on anticoagulation.  Essential hypertension.  Chronic kidney disease stage II.  Impaired mobility and ADLs.  Acquired hypothyroidism.  Chronic vertebral fractures.    Problem List:     Active Hospital Problems    Diagnosis  POA    **NSTEMI (non-ST elevated myocardial infarction) [I21.4]  Yes    Atrial fibrillation [I48.91]  Yes    Essential hypertension [I10]  Yes    Acquired hypothyroidism [E03.9]  Yes      Resolved Hospital Problems   No resolved problems to display.     Presenting Problem:    Chief Complaint   Patient presents with    Weakness - Generalized      Consults:     Consulting Physician(s)       Provider Relationship Specialty    Bello Bartlett MD Consulting Physician Cardiology          Procedures Performed:    None.    History of presenting illness/Hospital Course:    Ms. Sutton is a 89-year-old female, resident of assisted living facility at [facility], was brought to the emergency room by EMS with symptoms of generalized weakness and altered mental status.  Patient in fact was seen in the emergency room on 6/2/2023 after a fall.  Evaluation done in the emergency room did not reveal hip fractures but showed compression fracture of L3 vertebra.  Abrasion of the left elbow noted.  She was also noted to have a urinary tract infection and was discharged back on Keflex.  High-sensitivity troponin at that time was 59 with a repeat at 56.  According to the son, who spent some time with the patient yesterday, she was able to walk and have her lunch but was feeling weak.  Patient was subsequently more drowsy and confused which brought her to the emergency  room.  According to the son, patient did not complain of any chest pain at any point yesterday.  No prior history of coronary artery disease.     In the emergency room, she was afebrile but was noted to be in atrial fibrillation with rapid ventricular rate in the 130s and saturating at 94% on 2 L.  Initial blood pressure was 103/88 but subsequently dropped into the systolic 80s.  She received a liter of normal saline bolus in the emergency room along with IV Cardizem 10 mg push with improvement in her heart rate.  Troponin T was 183 with a repeat at 734.  Initial lactic acid was 5.1 with a repeat at 3.9.  CMP was remarkable for a BUN of 36, creatinine of 1.11 (baseline 0.8), glucose 188, alkaline phosphatase 159, , ALT 55.  Procalcitonin was within normal limits.  CBC was within normal limits.  ABG showed a pH of 7.38, PCO2 42, PO2 99 and bicarb of 25 on 2 L.  Chest x-ray showed cardiomegaly with interstitial disease, stable from prior exam.  CT of the head without contrast showed no acute abnormalities.  CT angiogram of the aorta and bilateral iliofemoral runoff showed poor contrast opacification but no significant aortic thrombus or occlusion of the runoff vessels noted.  Bilateral lung groundglass opacities noted which are nonspecific and could be edema versus atelectasis.  Large hiatal hernia was noted.  Multiple thoracic and lumbar vertebral fractures noted including T11, T12, L1, L3 and L4.     Patient's condition was discussed with Dr. Bartlett who recommended heparin drip and the patient was admitted to the medical floor with telemetry.  Unfortunately, patient was continued to be drowsy but was able to answer a few questions initially.  She became more obtunded and dyspneic.  She was placed on morphine for comfort measures.  After a few hours in the afternoon, patient became apneic and was noted to have asystole with the granddaughter at the bedside.  Patient was declared dead at 3:05 PM on 6/4/2023.  A  pause for life, moment of prayer observed at the bedside with the nursing staff and the patient's granddaughter.    Physical Exam:    Patient with no spontaneous breathing.  No carotid pulse palpable.  On auscultation no heart sounds heard.  Cardiac monitor showing flatline.  Pupils bilaterally dilated and fixed with absent corneal reflex.    Pertinent Lab Results:     Results from last 7 days   Lab Units 06/04/23  0148 06/02/23  2148   SODIUM mmol/L 136 137   POTASSIUM mmol/L 4.7 4.4   CHLORIDE mmol/L 93* 95*   CO2 mmol/L 21.2* 31.7*   BUN mg/dL 36* 24*   CREATININE mg/dL 1.11* 0.79   CALCIUM mg/dL 9.4 9.4   BILIRUBIN mg/dL 1.1 1.2   ALK PHOS U/L 159* 121*   ALT (SGPT) U/L 55* 22   AST (SGOT) U/L 103* 34*   GLUCOSE mg/dL 188* 107*     Results from last 7 days   Lab Units 06/04/23  0200 06/02/23  2148   WBC 10*3/mm3 9.58 8.54   HEMOGLOBIN g/dL 14.8 14.7   HEMATOCRIT % 46.2 44.8   PLATELETS 10*3/mm3 218 204     Results from last 7 days   Lab Units 06/04/23  0334   INR  1.12*     Results from last 7 days   Lab Units 06/04/23  0404 06/04/23  0148 06/02/23  2353   CK TOTAL U/L 271*  --   --    HSTROP T ng/L 734* 183* 56*       Results from last 7 days   Lab Units 06/04/23  0214   PH, ARTERIAL pH units 7.382   PO2 ART mm Hg 98.9   PCO2, ARTERIAL mm Hg 42.0   HCO3 ART mmol/L 25.0     Results from last 7 days   Lab Units 06/04/23  0200 06/04/23  0155 06/03/23  0020   BLOODCX  No growth at less than 24 hours No growth at less than 24 hours  --    URINECX   --   --  25,000 CFU/mL Mixed Mikki Isolated     Pertinent Radiology Results:    Imaging Results (All)       Procedure Component Value Units Date/Time    XR Chest 1 View [[id number]] Collected: 06/04/23 0657     Updated: 06/04/23 0701    Narrative:      PROCEDURE: XR CHEST 1 VW-     HISTORY: Weak/Dizzy/AMS triage protocol     COMPARISON: 2 days prior.     FINDINGS: The heart is enlarged with large hiatal hernia, similar to  prior exam. There is moderate, bilateral  interstitial disease and  bronchial wall thickening, similar to prior. Aortic mural calcifications  noted.. The lungs are clear. The mediastinum is unremarkable. There is  no pneumothorax.  There are no acute osseous abnormalities.       Impression:      Cardiomegaly and interstitial disease stable from prior..           This report was signed and finalized on 6/4/2023 6:59 AM by Pao Koo MD.    CT Angio Abdominal Aorta Bilateral Iliofem Runoff [[id number]] Collected: 06/04/23 0518     Updated: 06/04/23 0520    Narrative:      FINAL REPORT    TECHNIQUE:  null    CLINICAL HISTORY:  cool lower extremities    COMPARISON:  null    FINDINGS:  CT angiography abdomen and pelvis with bilateral lower extremity runoff using IV contrast. 3D post processing.    Comparison: None    Findings:    Dense contrast within the right heart with significant reflux into the inferior vena cava.    There is limited contrast opacification of the systemic arterial system.    No aneurysm or dissection is seen.    No intraluminal thrombus within the aorta is seen.    The iliofemoral, popliteal, and proximal trifurcation vessels appear patent. The mid to distal trifurcation vessels in the leg are unable to be clearly visualized.    Small right pleural effusion.    Mild ground-glass opacities at the lung bases are nonspecific.    Large hiatal hernia.    Cardiomegaly.    Cholecystectomy. Abdominal solid organs appear unremarkable.    No bowel obstruction, pneumoperitoneum, or pneumatosis.    Probable fundal fibroid 5.2 cm diameter. Ovaries not definitely identified.    Appendix not identified.    Small amount of ascites in the pelvis is nonspecific.    T12 vertebral plana and fractures of T11, L1, L3, and L4 appear old.    Moderate bilateral knee degenerative changes. No joint effusions.    Left tibial intramedullary nail. No hardware complications.    Soft tissue swelling at the ankle and feet. No localized fluid collections.      Impression:       Impression:    1. There is limited contrast opacification of the arterial system. No intraluminal thrombus in the aorta is seen. No occlusion of the runoff vessels is seen, however the mid to distal trifurcation vessels are unable to be clearly visualized due to the   lack of IV contrast.    2. Bilateral lung ground-glass opacities are nonspecific. This could be edema or atelectasis.    3. Large hiatal hernia.    4. Additional findings as above.    Authenticated and Electronically Signed by Kristen Rosado MD  on 06/04/2023 05:18:57 AM    CT Head Without Contrast [[id number]] Collected: 06/04/23 0320     Updated: 06/04/23 0321    Narrative:      FINAL REPORT    TECHNIQUE:  null    CLINICAL HISTORY:  ams    COMPARISON:  null    FINDINGS:  CT head without contrast    Comparison: CT/SR - CT HEAD WO CONTRAST - 6/2/23 21:58 EDT    Findings:    No intra-axial mass, midline shift, hydrocephalus, or acute hemorrhage.    Old small right basal ganglia lacunar infarcts.    Moderate atrophy like change.    Mild nonspecific white matter hypodensity.    There is no sinus or mastoid fluid.    The orbits are within normal limits.    There is no acute fracture.      Impression:      IMPRESSION:    1. No acute intracranial findings    Authenticated and Electronically Signed by Kristen Rosado MD  on 06/04/2023 03:20:09 AM     Code status during the hospital stay:    Code Status and Medical Interventions:   Ordered at: 06/04/23 1030     Medical Intervention Limits:    NO intubation (DNI)    NO cardioversion    NO dialysis     Level Of Support Discussed With:    Health Care Surrogate     Code Status (Patient has no pulse and is not breathing):    No CPR (Do Not Attempt to Resuscitate)     Medical Interventions (Patient has pulse or is breathing):    Limited Support     Release to patient:    Routine Release     Discharge Disposition:    Patient pronounced dead at 1505 on 6/4/2023.    Test Results Pending at  Discharge:    Pending Labs       Order Current Status    Blood Culture With THEO - Blood, Arm, Left Preliminary result    Blood Culture With THEO - Blood, Hand, Left Preliminary result          2D echocardiogram was done on 6/4/2023 and the report is currently pending.       Barak Flynn MD  06/04/23  15:13 EDT    Time: I spent 25 minutes on this discharge activity which included: face-to-face encounter with the patient and/or family, reviewing the data in the system, coordination of the care with the nursing staff as well as consultants, documentation, and entering orders.     Dictated utilizing Dragon dictation.    Electronically signed by Barak Flynn MD at 06/04/23 5095

## 2023-06-09 LAB
BACTERIA SPEC AEROBE CULT: NORMAL
BACTERIA SPEC AEROBE CULT: NORMAL

## 2025-01-07 NOTE — TELEPHONE ENCOUNTER
Fosamax Plus D requires a prior authorization     Do you want me to continue with a prior auth or do you want to change to regular alendronate?  
I have called in a new prescription for her  
No